# Patient Record
Sex: MALE | Race: WHITE | NOT HISPANIC OR LATINO | Employment: OTHER | URBAN - METROPOLITAN AREA
[De-identification: names, ages, dates, MRNs, and addresses within clinical notes are randomized per-mention and may not be internally consistent; named-entity substitution may affect disease eponyms.]

---

## 2017-01-09 DIAGNOSIS — E87.6 HYPOKALEMIA: ICD-10-CM

## 2017-01-09 LAB
ANION GAP SERPL CALCULATED.3IONS-SCNC: 9 MMOL/L (ref 3–14)
BUN SERPL-MCNC: 20 MG/DL (ref 7–30)
CALCIUM SERPL-MCNC: 9 MG/DL (ref 8.5–10.1)
CHLORIDE SERPL-SCNC: 99 MMOL/L (ref 94–109)
CO2 SERPL-SCNC: 31 MMOL/L (ref 20–32)
CREAT SERPL-MCNC: 0.95 MG/DL (ref 0.66–1.25)
GFR SERPL CREATININE-BSD FRML MDRD: 80 ML/MIN/1.7M2
GLUCOSE SERPL-MCNC: 108 MG/DL (ref 70–99)
POTASSIUM SERPL-SCNC: 3.5 MMOL/L (ref 3.4–5.3)
SODIUM SERPL-SCNC: 139 MMOL/L (ref 133–144)

## 2017-01-09 PROCEDURE — 99000 SPECIMEN HANDLING OFFICE-LAB: CPT | Performed by: FAMILY MEDICINE

## 2017-01-09 PROCEDURE — 80048 BASIC METABOLIC PNL TOTAL CA: CPT | Mod: 90 | Performed by: FAMILY MEDICINE

## 2017-01-09 PROCEDURE — 36415 COLL VENOUS BLD VENIPUNCTURE: CPT | Performed by: FAMILY MEDICINE

## 2017-01-11 DIAGNOSIS — E87.6 HYPOKALEMIA: Primary | ICD-10-CM

## 2017-02-23 DIAGNOSIS — E87.6 HYPOKALEMIA: ICD-10-CM

## 2017-02-23 LAB — POTASSIUM SERPL-SCNC: 3.1 MMOL/L (ref 3.4–5.3)

## 2017-02-23 PROCEDURE — 84132 ASSAY OF SERUM POTASSIUM: CPT | Performed by: FAMILY MEDICINE

## 2017-02-23 PROCEDURE — 36415 COLL VENOUS BLD VENIPUNCTURE: CPT | Performed by: FAMILY MEDICINE

## 2017-02-27 ENCOUNTER — TELEPHONE (OUTPATIENT)
Dept: FAMILY MEDICINE | Facility: CLINIC | Age: 62
End: 2017-02-27

## 2017-02-27 DIAGNOSIS — I10 BENIGN ESSENTIAL HYPERTENSION: Primary | ICD-10-CM

## 2017-02-27 RX ORDER — LISINOPRIL 20 MG/1
20 TABLET ORAL DAILY
Qty: 30 TABLET | Refills: 0 | Status: SHIPPED | OUTPATIENT
Start: 2017-02-27 | End: 2017-03-16

## 2017-02-27 NOTE — TELEPHONE ENCOUNTER
Reason for Call:  Other prescription    Detailed comments: pt is calling because his potasium is low and he wants to know what to do for this   And he wants to know if another medication could be the cause of this      Phone Number Patient can be reached at: Home number on file [phone] (home)    Best Time: any     Can we leave a detailed message on this number? YES    Call taken on 2/27/2017 at 9:15 AM by Agustina Starr

## 2017-02-27 NOTE — TELEPHONE ENCOUNTER
Advised pt of instructions below.  Pt wants to try a different blood pressure medication.  Routed to care team providers as Dr Hauser is out of office this week.  Samantha Viveros RN

## 2017-02-27 NOTE — TELEPHONE ENCOUNTER
2/23/17 potassium is 3.1.    Your potassium level is still low.  This can be due to using a water pill.       To address this, you can ether:   1) continue supplement with the water pill indefinitely.   2) change chlorthalidone to a different blood pressure medication     Please let us know how to proceed.     Please contact the clinic if you have any additional questions or concerns.  Have a great day!     Yours truly,   Dr. Hauser

## 2017-03-16 ENCOUNTER — OFFICE VISIT (OUTPATIENT)
Dept: FAMILY MEDICINE | Facility: CLINIC | Age: 62
End: 2017-03-16
Payer: COMMERCIAL

## 2017-03-16 VITALS
SYSTOLIC BLOOD PRESSURE: 120 MMHG | TEMPERATURE: 97.3 F | WEIGHT: 180 LBS | DIASTOLIC BLOOD PRESSURE: 86 MMHG | HEART RATE: 88 BPM | BODY MASS INDEX: 27.28 KG/M2 | HEIGHT: 68 IN

## 2017-03-16 DIAGNOSIS — E87.6 HYPOKALEMIA: ICD-10-CM

## 2017-03-16 DIAGNOSIS — I10 BENIGN ESSENTIAL HYPERTENSION: Primary | ICD-10-CM

## 2017-03-16 DIAGNOSIS — J44.9 CHRONIC OBSTRUCTIVE PULMONARY DISEASE, UNSPECIFIED COPD TYPE (H): ICD-10-CM

## 2017-03-16 LAB
ANION GAP SERPL CALCULATED.3IONS-SCNC: 7 MMOL/L (ref 3–14)
BUN SERPL-MCNC: 13 MG/DL (ref 7–30)
CALCIUM SERPL-MCNC: 8.9 MG/DL (ref 8.5–10.1)
CHLORIDE SERPL-SCNC: 104 MMOL/L (ref 94–109)
CO2 SERPL-SCNC: 29 MMOL/L (ref 20–32)
CREAT SERPL-MCNC: 0.8 MG/DL (ref 0.66–1.25)
GFR SERPL CREATININE-BSD FRML MDRD: ABNORMAL ML/MIN/1.7M2
GLUCOSE SERPL-MCNC: 109 MG/DL (ref 70–99)
POTASSIUM SERPL-SCNC: 3.8 MMOL/L (ref 3.4–5.3)
SODIUM SERPL-SCNC: 140 MMOL/L (ref 133–144)

## 2017-03-16 PROCEDURE — 99214 OFFICE O/P EST MOD 30 MIN: CPT | Performed by: FAMILY MEDICINE

## 2017-03-16 PROCEDURE — 36415 COLL VENOUS BLD VENIPUNCTURE: CPT | Performed by: FAMILY MEDICINE

## 2017-03-16 PROCEDURE — 80048 BASIC METABOLIC PNL TOTAL CA: CPT | Performed by: FAMILY MEDICINE

## 2017-03-16 RX ORDER — LISINOPRIL 20 MG/1
20 TABLET ORAL DAILY
Qty: 90 TABLET | Refills: 3 | Status: SHIPPED | OUTPATIENT
Start: 2017-03-16 | End: 2018-03-26

## 2017-03-16 NOTE — LETTER
My COPD Action Plan   Name: Dustin Shah    YOB: 1955   Date: 3/16/2017    My doctor: Kirill Hauser MD   My clinic: [address] [address] [phone]  My Controller Medicine: Serevent/Fluticasone (Advair)   Dose: 250/50 mcg 1 puff twice a day      My Rescue Medicine: Albuterol (Proair/Ventolin/Proventil) inhaler   Dose: Take 2 puffs every 4 hrs as needed for wheezing; 2 puffs before exercise     My Flare Up Medicine: none   Dose: n/a  My COPD Severity: Mild = FeV1 > 80%     Use of Oxygen: Oxygen Not Prescribed         GREEN ZONE       Doing well today      Usual level of activity and exercise    Usual amount of cough and mucus    No shortness of breath    Usual level of health (thinking clearly, sleeping well, feel like eating) Actions:      Take daily medicines    Use oxygen as prescribed    Follow regular exercise and diet plan    Avoid cigarette smoke and other irritants that harm the lungs           YELLOW ZONE          Having a bad day or flare up      Short of breath more than usual    A lot more sputum (mucus) than usual    Sputum looks yellow, green, tan, brown or bloody    More coughing or wheezing    Fever or chills    Less energy; trouble completing activities    Trouble thinking or focusing    Using quick relief inhaler or nebulizer more often    Poor sleep; symptoms wake me up    Do not feel like eating Actions:      Get plenty of rest    Take daily medicines    Use quick relief inhaler every 4 hours    If you use oxygen, call you doctor to see if you should adjust your oxygen    Do breathing exercises or other things to help you relax    Let a loved one, friend or neighbor know you are feeling worse    Call your care team if you have 2 or more symptoms.  Start taking steroids or antibiotics if directed by your care team           RED ZONE       Need medical care now      Severe shortness of breath (feel you can't  breathe)    Fever, chills    Not enough breath to do any activity    Trouble coughing up mucus, walking or talking    Blood in mucus    Frequent coughing   Rescue medicines are not working    Not able to sleep because of breathing    Feel confused or drowsy    Chest pain    Actions:      Call your health care team.  If you cannot reach your care team, call 911 or go to the emergency room.        Electronically signed by: Kirill Hauser, March 16, 2017  Annual Reminders:  Meet with Care Team, Flu Shot every Fall and Pneumonia Shot at least once  [facility] [place] [facility] [id number] [facility]

## 2017-03-16 NOTE — PROGRESS NOTES
"  SUBJECTIVE:                                                    Dustin Shah is a 61 year old male who presents to clinic today for the following health issues:  Chief Complaint   Patient presents with     Hypertension     Pt here for b/p f/u after starting lisinopril 2 wks ago.       Hypertension Follow-up      Outpatient blood pressures are being checked at home.  Results are normal.    Low Salt Diet: no added salt    Denies chest pain, dyspnea, HA, BOV, dizziness or urinary changes.    Patient tolerating lisinopril.  This replaced the diuretic due to hypokalemia.    Last K level was 3.1 3 weeks ago. Patient denies weakness, chest pain or GI symptoms.       Amount of exercise or physical activity: none due to recent foot surgery    Problems taking medications regularly: No    Medication side effects: none  Diet: low salt    COPD Follow-Up    Symptoms are currently: stable    Current fatigue or dyspnea with ambulation: none    Shortness of breath: only on overexertion - resolves by restsing and does not require rescue inhaler    Increased or change in Cough/Sputum: No    Fever(s): No    Baseline ambulation without stopping to rest \"no limit\" per patient. Able to walk up more than 3 flights of stairs without stopping to rest.    Any ER/UC or hospital admissions since your last visit? No     History   Smoking Status     Former Smoker     Packs/day: 1.00     Years: 40.00     Types: Cigarettes     Quit date: 12/8/2013   Smokeless Tobacco     Never Used     FEV1 in 84% in 2015.       Problem list and histories reviewed & adjusted, as indicated.  Additional history: as documented    Patient Active Problem List   Diagnosis     HL (hearing loss)     COPD (chronic obstructive pulmonary disease) (H)     CARDIOVASCULAR SCREENING; LDL GOAL LESS THAN 130     Benign essential hypertension     Former smoker     Undiagnosed cardiac murmurs     Hyperlipidemia with target LDL less than 100     Status post coronary angiogram     " COPD, moderate (H)     Advanced directives, counseling/discussion     Tubular adenoma of colon     Bicuspid aortic valve     Overweight (BMI 25.0-29.9)     Past Surgical History   Procedure Laterality Date     Colonoscopy       Hernia repair       Ent surgery       Tonsels       Social History   Substance Use Topics     Smoking status: Former Smoker     Packs/day: 1.00     Years: 40.00     Types: Cigarettes     Quit date: 2013     Smokeless tobacco: Never Used     Alcohol use Yes      Comment: social     Family History   Problem Relation Age of Onset     DIABETES Mother      Peripheral Vascular Disease Mother      mother w/ PAD     Heart Failure Mother      Heart Failure Maternal Half-Brother       of heart attack age 57     Coronary Artery Disease No family hx of          Current Outpatient Prescriptions   Medication Sig Dispense Refill     lisinopril (PRINIVIL/ZESTRIL) 20 MG tablet Take 1 tablet (20 mg) by mouth daily 90 tablet 3     fluticasone-salmeterol (ADVAIR) 250-50 MCG/DOSE diskus inhaler Inhale 1 puff into the lungs 2 times daily 3 Inhaler 1     omega 3 1000 MG CAPS Take 1 g by mouth daily 90 capsule      calcium carbonate-vitamin D 500-400 MG-UNIT TABS tablt Take 1 tablet by mouth daily 180 tablet 3     albuterol (PROAIR HFA, PROVENTIL HFA, VENTOLIN HFA) 108 (90 BASE) MCG/ACT inhaler Inhale 2 puffs into the lungs every 4 hours as needed for shortness of breath / dyspnea or wheezing 1 Inhaler 6     aspirin 81 MG tablet Take by mouth daily 30 tablet      [DISCONTINUED] lisinopril (PRINIVIL/ZESTRIL) 20 MG tablet Take 1 tablet (20 mg) by mouth daily 30 tablet 0     triamcinolone (KENALOG) 0.1 % ointment Apply sparingly to affected area two times daily for up to 10 consecutive days (Patient not taking: Reported on 3/16/2017) 30 g 0     order for DME Equipment being ordered: BP cuff 1 each prn     Allergies   Allergen Reactions     Nka [No Known Allergies]        Reviewed and updated as needed this  "visit by clinical staff  Tobacco  Allergies  Meds  Problems  Med Hx  Surg Hx  Fam Hx  Soc Hx        Reviewed and updated as needed this visit by Provider  Allergies  Meds  Problems         ROS:  C: NEGATIVE for fever, chills, change in weight  I: NEGATIVE for worrisome rashes, moles or lesions  E: NEGATIVE for vision changes or irritation  R: NEGATIVE for significant cough or SOB  CV: NEGATIVE for chest pain, palpitations or peripheral edema  GI: NEGATIVE for nausea, abdominal pain, heartburn, or change in bowel habits  : NEGATIVE for frequency, dysuria, or hematuria  M: NEGATIVE for significant arthralgias or myalgia  N: NEGATIVE for weakness, dizziness or paresthesias  E: NEGATIVE for temperature intolerance, skin/hair changes  H: NEGATIVE for bleeding problems    OBJECTIVE:                                                    /86  Pulse 88  Temp 97.3  F (36.3  C) (Tympanic)  Ht 5' 7.75\" (1.721 m)  Wt 180 lb (81.6 kg)  BMI 27.57 kg/m2  Body mass index is 27.57 kg/(m^2).  GENERAL:  alert and no distress, ambulatory w/o assist  NECK: no tenderness, no adenopathy,  Thyroid not enlarged  RESP: lungs clear to auscultation - no rales, no rhonchi, no wheezes  CV: regular rates and rhythm, no murmur  MS: no edema  SKIN: no suspicious lesions, no rashes  ABD:  nontender    Diagnostic test results:  Diagnostic Test Results:  Results for orders placed or performed in visit on 03/16/17   Basic metabolic panel   Result Value Ref Range    Sodium 140 133 - 144 mmol/L    Potassium 3.8 3.4 - 5.3 mmol/L    Chloride 104 94 - 109 mmol/L    Carbon Dioxide 29 20 - 32 mmol/L    Anion Gap 7 3 - 14 mmol/L    Glucose 109 (H) 70 - 99 mg/dL    Urea Nitrogen 13 7 - 30 mg/dL    Creatinine 0.80 0.66 - 1.25 mg/dL    GFR Estimate >90  Non  GFR Calc   >60 mL/min/1.7m2    GFR Estimate If Black >90   GFR Calc   >60 mL/min/1.7m2    Calcium 8.9 8.5 - 10.1 mg/dL        ASSESSMENT/PLAN:                     "                                    ICD-10-CM    1. Benign essential hypertension I10 Basic metabolic panel     lisinopril (PRINIVIL/ZESTRIL) 20 MG tablet  Controlled.  Low salt, low fat diet. Exercise as tolerated 30 mins per day 3 days a week.  Take meds as prescribed; call if with side effects.      2. Hypokalemia E87.6 Basic metabolic panel  Expect improvement after diuretic was stopped 2 weeks ago.     3. Chronic obstructive pulmonary disease, unspecified COPD type (H) J44.9 Controlled.  Continue Advair daily.  Albuterol inhaler prn wheezing - patient states he rarely uses, if at all.  UTD with immunizations.       Follow up with RN 6 months for BP recheck.   Patient Instructions   Blood pressure controlled now.  Continue Lisinopril at current dose.  Low salt, low fat diet.   Exercise: moderate intensity sustained for at least 30 mins per episode, goal of 150 mins per week at least  Schedule nurse visit in 6 months for a blood pressure recheck.  If you have physical symptoms or concerns before then, see provider.    Go to [facility] now for your blood draw.  You will be contacted in 1-2 days for results of your lab tests.            Kirill Hauser MD  [facility]

## 2017-03-16 NOTE — NURSING NOTE
"Chief Complaint   Patient presents with     Hypertension     Pt here for b/p f/u after starting lisinopril 2 wks ago.       Initial BP (!) 126/91  Pulse 88  Temp 97.3  F (36.3  C) (Tympanic)  Ht 5' 7.75\" (1.721 m)  Wt 180 lb (81.6 kg)  BMI 27.57 kg/m2 Estimated body mass index is 27.57 kg/(m^2) as calculated from the following:    Height as of this encounter: 5' 7.75\" (1.721 m).    Weight as of this encounter: 180 lb (81.6 kg).  Medication Reconciliation: complete  Luisa Fitzgerald CMA    "

## 2017-03-16 NOTE — PATIENT INSTRUCTIONS
Blood pressure controlled now.  Continue Lisinopril at current dose.  Low salt, low fat diet.   Exercise: moderate intensity sustained for at least 30 mins per episode, goal of 150 mins per week at least  Schedule nurse visit in 6 months for a blood pressure recheck.  If you have physical symptoms or concerns before then, see provider.    Go to Clinic [facility] now for your blood draw.  You will be contacted in 1-2 days for results of your lab tests.

## 2017-03-16 NOTE — MR AVS SNAPSHOT
After Visit Summary   3/16/2017    Dustin Shah    MRN: [id number]           Patient Information     Date Of Birth          1955        Visit Information        Provider Department      3/16/2017 7:20 AM Kirill Hauser MD [facility]        Today's Diagnoses     Benign essential hypertension    -  1    Hypokalemia        Chronic obstructive pulmonary disease, unspecified COPD type (H)          Care Instructions    Blood pressure controlled now.  Continue Lisinopril at current dose.  Low salt, low fat diet.   Exercise: moderate intensity sustained for at least 30 mins per episode, goal of 150 mins per week at least  Schedule nurse visit in 6 months for a blood pressure recheck.  If you have physical symptoms or concerns before then, see provider.    Go to Clinic [facility] now for your blood draw.  You will be contacted in 1-2 days for results of your lab tests.              Follow-ups after your visit        Follow-up notes from your care team     Return in about 6 months (around 9/16/2017), or if symptoms worsen or fail to improve.      Who to contact     If you have questions or need follow up information about today's clinic visit or your schedule please contact [facility] directly at [phone].  Normal or non-critical lab and imaging results will be communicated to you by MyChart, letter or phone within 4 business days after the clinic has received the results. If you do not hear from us within 7 days, please contact the clinic through [facility] or phone. If you have a critical or abnormal lab result, we will notify you by phone as soon as possible.  Submit refill requests through [facility] or call your pharmacy and they will forward the refill request to us. Please allow 3 business days for your refill to be completed.          Additional Information About Your Visit        MyChart Information     [facility] gives you secure access to your electronic health record.  "If you see a primary care provider, you can also send messages to your care team and make appointments. If you have questions, please call your primary care clinic.  If you do not have a primary care provider, please call [phone] and they will assist you.        Care EveryWhere ID     This is your Care EveryWhere ID. This could be used by other organizations to access your [place] medical records  [id number]        Your Vitals Were     Pulse Temperature Height BMI (Body Mass Index)          88 97.3  F (36.3  C) (Tympanic) 5' 7.75\" (1.721 m) 27.57 kg/m2         Blood Pressure from Last 3 Encounters:   03/16/17 120/86   12/16/16 120/80   09/01/16 126/88    Weight from Last 3 Encounters:   03/16/17 180 lb (81.6 kg)   12/16/16 179 lb (81.2 kg)   09/01/16 179 lb 6.4 oz (81.4 kg)              We Performed the Following     Basic metabolic panel          Where to get your medicines      These medications were sent to [address] [address]     Phone:  [phone]     lisinopril 20 MG tablet          Primary Care Provider Office Phone # Fax #    Kirill Hauser [phone]       [facility]        [address] [facility] [address]        Thank you!     Thank you for choosing [facility]  for your care. Our goal is always to provide you with excellent care. Hearing back from our patients is one way we can continue to improve our services. Please take a few minutes to complete the written survey that you may receive in the mail after your visit with us. Thank you!             Your Updated Medication List - Protect others around you: Learn how to safely use, store and throw away your medicines at www.disposemymeds.org.          This list is accurate as of: 3/16/17  7:59 AM.  Always use your most recent med list.                   Brand Name Dispense Instructions for use    " albuterol 108 (90 BASE) MCG/ACT Inhaler    PROAIR HFA/PROVENTIL HFA/VENTOLIN HFA    1 Inhaler    Inhale 2 puffs into the lungs every 4 hours as needed for shortness of breath / dyspnea or wheezing       aspirin 81 MG tablet     30 tablet    Take by mouth daily       calcium carbonate-vitamin D 500-400 MG-UNIT Tabs per tablet     180 tablet    Take 1 tablet by mouth daily       fluticasone-salmeterol 250-50 MCG/DOSE diskus inhaler    ADVAIR    3 Inhaler    Inhale 1 puff into the lungs 2 times daily       lisinopril 20 MG tablet    PRINIVIL/ZESTRIL    90 tablet    Take 1 tablet (20 mg) by mouth daily       omega 3 1000 MG Caps     90 capsule    Take 1 g by mouth daily       order for DME     1 each    Equipment being ordered: BP cuff       triamcinolone 0.1 % ointment    KENALOG    30 g    Apply sparingly to affected area two times daily for up to 10 consecutive days

## 2017-04-21 ENCOUNTER — HOSPITAL ENCOUNTER (OUTPATIENT)
Dept: PHYSICAL THERAPY | Facility: CLINIC | Age: 62
Setting detail: THERAPIES SERIES
End: 2017-04-21
Attending: FAMILY MEDICINE
Payer: OTHER MISCELLANEOUS

## 2017-04-21 PROCEDURE — 97110 THERAPEUTIC EXERCISES: CPT | Mod: GP | Performed by: PHYSICAL THERAPIST

## 2017-04-21 PROCEDURE — 97161 PT EVAL LOW COMPLEX 20 MIN: CPT | Mod: GP | Performed by: PHYSICAL THERAPIST

## 2017-04-21 PROCEDURE — 97140 MANUAL THERAPY 1/> REGIONS: CPT | Mod: GP | Performed by: PHYSICAL THERAPIST

## 2017-04-21 PROCEDURE — 40000718 ZZHC STATISTIC PT DEPARTMENT ORTHO VISIT: Performed by: PHYSICAL THERAPIST

## 2017-04-21 NOTE — PROGRESS NOTES
04/21/17 1100   General Information   Type of Visit Initial OP Ortho PT Evaluation   Start of Care Date 04/21/17   Referring Physician Yaakov Tate    Patient/Family Goals Statement Get back to work, Ladders, lifting, Squatting for work duties.    Orders Evaluate and Treat   Orders Comment Gait training, TBand, Wean off crutches and out of boot.    Date of Order 04/13/17   Insurance Type Other   Insurance Comments/Visits Authorized Work Comp - patient states that therapy has been approved.    Medical Diagnosis L Distal Fib Fracture, ORIF   Surgical/Medical history reviewed (R Arm Fx 7 years, High BP, Quit smoke 2013, Hernia 20 years.)   Precautions/Limitations no known precautions/limitations  (No full WBing yet. No stairs, except w/ crutches at home. )   Weight-Bearing Status - LLE weight-bearing as tolerated   Special Instructions Might go back to work light duty May 1st. Returns to MD May 25th.    General Information Comments No meds.    Body Part(s)   Body Part(s) Ankle/Foot   Presentation and Etiology   Pertinent history of current problem (include personal factors and/or comorbidities that impact the POC) Feb 7/17 was walking on a 45 degree slant downhill, it was icey, fell and twisted L ankle. Made it into work, and when took boot off, foot was really loose. Went to ER right away at [facility], X'Ray showed L Fibular Fx, had surgery on 2/24/17 with plate and screws into the Fibula.    Impairments A. Pain;B. Decreased WB tolerance;C. Swelling;E. Decreased flexibility;H. Impaired gait   Functional Limitations perform required work activities;perform desired leisure / sports activities   Symptom Location P once/while late at night across front of ankle, sharp.    How/Where did it occur With a fall   Onset date of current episode/exacerbation 02/17/17   Chronicity New   Pain quality A. Sharp   Frequency of pain/symptoms B. Intermittent   Pain/symptoms are: Worse during the night   Pain/symptoms exacerbated by  (All Day activity )   Pain/symptoms eased by C. Rest   Progression of symptoms since onset: Improved   Prior Level of Function   Functional Level Prior Comment Independent w/ ADL's.    Current Level of Function   Current Community Support Family/friend caregiver   Patient role/employment history A. Employed   Employment Comments  Technic, .    Living environment [facility]   Home/community accessibility Split level, has railings.    Current equipment-Gait/Locomotion Axillary crutches   Current equipment-ADL Shower/tub chair;Wall grab bar   Fall Risk Screen   Fall screen completed by PT   Per patient - Fall 2 or more times in past year? No   Per patient - Fall with injury in past year? Yes   Timed Up and Go score (seconds) Crutches.    Is patient a fall risk? Yes   System Outcome Measures   Outcome Measures (LEFS 43/80 )   Functional Scales   Functional Scales Patient Specific Functional Scale   Patient Specific Functional Scale Q1:;Q2:;Q3:   Q1: Able to squat w/minimal difficulty    Q2: MD Limitations for ladders for work duties   Q3: MD limitations for stairs at work.    Ankle/Foot Objective Findings   Observation No acute distress.    Integumentary Slight pitting edema lateral dorsal foot.    Gait/Locomotion Crutches WBAT.    Ankle/Foot ROM Comment PF L 25, R 41; DF L 14, R 19. Inv L 25% of R, Eversion L 50% of R.    Ankle/Foot Strength Comments Hip, Knee, Ankle DF 5/5,    Palpation No tenderness to Palpation.    Accessory Motion/Joint Mobility Tight Tib/Fib Distal and proximal, Forefoot, Talo-crural jt.    Planned Therapy Interventions   Planned Therapy Interventions Comment MT,. STM, S&S, Jt mobs, Gait, Proprioception, Balance Training. Develop HEP    Planned Modality Interventions   Planned Modality Interventions Comments Modalities as indicated.    Clinical Impression   Criteria for Skilled Therapeutic Interventions Met yes, treatment indicated   PT Diagnosis Decreased mobility  d/t Ankle Fx and ORIF.    Influenced by the following impairments Impaired Gait, WBing, Stiffness    Functional limitations due to impairments Workability, Ambulation, mobility in community.    Clinical Presentation Evolving/Changing   Clinical Presentation Rationale LEFS, ROM, Decreased jt mobility in L foot/ankle complex.    Clinical Decision Making (Complexity) Low complexity   Therapy Frequency 2 times/Week   Predicted Duration of Therapy Intervention (days/wks) 1 month, then reassess.    Risk & Benefits of therapy have been explained Yes   Patient, Family & other staff in agreement with plan of care Yes   Clinical Impression Comments Decreased mobility d/t Ankle Fx and ORIF.    Education Assessment   Preferred Learning Style Listening;Demonstration;Pictures/video   Barriers to Learning No barriers   ORTHO GOALS   PT Ortho Eval Goals 1;2;3;4   Ortho Goal 1   Goal Identifier 1   Goal Description STG: Pt able to go up/down stairs w/o difficulty for work duties    Target Date 05/19/17   Ortho Goal 2   Goal Identifier 2   Goal Description STG: Pt will be able to squat w/o difficulty or P for work duties.    Target Date 05/19/17   Ortho Goal 3   Goal Identifier 3   Goal Description STG: Pt willl have limitation removed for ladders, so that he can do his complete work duties.    Target Date 06/16/17   Ortho Goal 4   Goal Identifier 4   Goal Description LTG: PT will be independent w/HEP and self cares to be able to return to full work duties.    Target Date 07/21/17   Total Evaluation Time   Total Evaluation Time 55 Total (Eval x 30, Ex x 10, MT x 15)    Marry Parry, PT, [facility] (#[id number])  [facility]           [phone]  Fax          [phone]  Appt #      [phone]

## 2017-04-21 NOTE — PROGRESS NOTES
"  Date/Exercise  4/21/17         TBand x 4 DF/PF/Inv/Amie  Green x 10-30         PROM by Patient   All Planes        Towel STretch   Gas/Sol 15\" x 2  2-3x/day         Squats, Side Lunges, Heel-Toe         Gas/Sol Wall Stretch          Forward Lunge           Side, Retro, Braid, Tandem          BOSU, BAPS, Airex          EO/EC Ankle, SLS, Tandem                                                       "

## 2017-04-24 ENCOUNTER — HOSPITAL ENCOUNTER (OUTPATIENT)
Dept: PHYSICAL THERAPY | Facility: CLINIC | Age: 62
Setting detail: THERAPIES SERIES
End: 2017-04-24
Attending: ORTHOPAEDIC SURGERY
Payer: OTHER MISCELLANEOUS

## 2017-04-24 PROCEDURE — 97140 MANUAL THERAPY 1/> REGIONS: CPT | Mod: GP | Performed by: PHYSICAL THERAPIST

## 2017-04-24 PROCEDURE — 40000718 ZZHC STATISTIC PT DEPARTMENT ORTHO VISIT: Performed by: PHYSICAL THERAPIST

## 2017-04-24 PROCEDURE — 97110 THERAPEUTIC EXERCISES: CPT | Mod: GP | Performed by: PHYSICAL THERAPIST

## 2017-04-27 ENCOUNTER — HOSPITAL ENCOUNTER (OUTPATIENT)
Dept: PHYSICAL THERAPY | Facility: CLINIC | Age: 62
Setting detail: THERAPIES SERIES
End: 2017-04-27
Attending: ORTHOPAEDIC SURGERY
Payer: OTHER MISCELLANEOUS

## 2017-04-27 PROCEDURE — 97110 THERAPEUTIC EXERCISES: CPT | Mod: GP | Performed by: PHYSICAL THERAPIST

## 2017-04-27 PROCEDURE — 97140 MANUAL THERAPY 1/> REGIONS: CPT | Mod: GP | Performed by: PHYSICAL THERAPIST

## 2017-04-27 PROCEDURE — 40000718 ZZHC STATISTIC PT DEPARTMENT ORTHO VISIT: Performed by: PHYSICAL THERAPIST

## 2017-05-03 ENCOUNTER — HOSPITAL ENCOUNTER (OUTPATIENT)
Dept: PHYSICAL THERAPY | Facility: CLINIC | Age: 62
Setting detail: THERAPIES SERIES
End: 2017-05-03
Attending: ORTHOPAEDIC SURGERY
Payer: OTHER MISCELLANEOUS

## 2017-05-03 PROCEDURE — 97110 THERAPEUTIC EXERCISES: CPT | Mod: GP | Performed by: PHYSICAL THERAPIST

## 2017-05-03 PROCEDURE — 40000718 ZZHC STATISTIC PT DEPARTMENT ORTHO VISIT: Performed by: PHYSICAL THERAPIST

## 2017-05-05 ENCOUNTER — HOSPITAL ENCOUNTER (OUTPATIENT)
Dept: PHYSICAL THERAPY | Facility: CLINIC | Age: 62
Setting detail: THERAPIES SERIES
End: 2017-05-05
Attending: ORTHOPAEDIC SURGERY
Payer: OTHER MISCELLANEOUS

## 2017-05-05 PROCEDURE — 97140 MANUAL THERAPY 1/> REGIONS: CPT | Mod: GP | Performed by: PHYSICAL THERAPIST

## 2017-05-05 PROCEDURE — 40000718 ZZHC STATISTIC PT DEPARTMENT ORTHO VISIT: Performed by: PHYSICAL THERAPIST

## 2017-05-05 PROCEDURE — 97110 THERAPEUTIC EXERCISES: CPT | Mod: GP | Performed by: PHYSICAL THERAPIST

## 2017-05-11 ENCOUNTER — HOSPITAL ENCOUNTER (OUTPATIENT)
Dept: PHYSICAL THERAPY | Facility: CLINIC | Age: 62
Setting detail: THERAPIES SERIES
End: 2017-05-11
Attending: ORTHOPAEDIC SURGERY
Payer: OTHER MISCELLANEOUS

## 2017-05-11 PROCEDURE — 40000718 ZZHC STATISTIC PT DEPARTMENT ORTHO VISIT: Performed by: PHYSICAL THERAPIST

## 2017-05-11 PROCEDURE — 97110 THERAPEUTIC EXERCISES: CPT | Mod: GP | Performed by: PHYSICAL THERAPIST

## 2017-05-18 ENCOUNTER — HOSPITAL ENCOUNTER (OUTPATIENT)
Dept: PHYSICAL THERAPY | Facility: CLINIC | Age: 62
Setting detail: THERAPIES SERIES
End: 2017-05-18
Attending: ORTHOPAEDIC SURGERY
Payer: OTHER MISCELLANEOUS

## 2017-05-18 PROCEDURE — 97035 APP MDLTY 1+ULTRASOUND EA 15: CPT | Mod: GP | Performed by: PHYSICAL THERAPIST

## 2017-05-18 PROCEDURE — 40000718 ZZHC STATISTIC PT DEPARTMENT ORTHO VISIT: Performed by: PHYSICAL THERAPIST

## 2017-05-18 PROCEDURE — 97110 THERAPEUTIC EXERCISES: CPT | Mod: GP | Performed by: PHYSICAL THERAPIST

## 2017-05-19 DIAGNOSIS — J44.9 COPD, MODERATE (H): ICD-10-CM

## 2017-05-19 NOTE — TELEPHONE ENCOUNTER
Advair       Last Written Prescription Date: 11/02/16  Last Fill Quantity: 3, # refills: 1    Last Office Visit with [facility] or [facility] prescribing provider:  03/16/17   Future Office Visit:       Date of Last Asthma Action Plan Letter:   There are no preventive care reminders to display for this patient.   Asthma Control Test: No flowsheet data found.    Date of Last Spirometry Test:   No results found for this or any previous visit.

## 2017-05-22 ENCOUNTER — HOSPITAL ENCOUNTER (OUTPATIENT)
Dept: PHYSICAL THERAPY | Facility: CLINIC | Age: 62
Setting detail: THERAPIES SERIES
End: 2017-05-22
Attending: ORTHOPAEDIC SURGERY
Payer: OTHER MISCELLANEOUS

## 2017-05-22 PROCEDURE — 97110 THERAPEUTIC EXERCISES: CPT | Mod: GP | Performed by: PHYSICAL THERAPIST

## 2017-05-22 PROCEDURE — 40000718 ZZHC STATISTIC PT DEPARTMENT ORTHO VISIT: Performed by: PHYSICAL THERAPIST

## 2017-05-22 NOTE — PROGRESS NOTES
PROGRESS NOTE FOR MD 05/22/17 0700   Signing Clinician's Name / Credentials   Signing clinician's name / credentials Marry Parry, PT, MTC    Session Number   Session Number 8/13 W/C.    Progress Note/Recertification   Progress Note Due Date 06/22/17   Progress Note Completed Date 05/22/17   Adult Goals   PT Ortho Eval Goals 1;2;3;4   Ortho Goal 1   Goal Identifier 1   Goal Description STG: Pt able to go up/down stairs w/o difficulty for work duties  5/22/17 Minimal Difficulty    Target Date 05/19/17   Ortho Goal 2   Goal Identifier 2   Goal Description STG: Pt will be able to squat w/o difficulty or P for work duties.  5/22/17 No difficulty.    Target Date 05/19/17   Date Met 05/22/17   Ortho Goal 3   Goal Identifier 3   Goal Description STG: Pt willl have limitation removed for ladders, so that he can do his complete work duties.  5/22/17 NOT MET    Target Date 06/16/17   Ortho Goal 4   Goal Identifier 4   Goal Description LTG: PT will be independent w/HEP and self cares to be able to return to full work duties.    Target Date 07/21/17   Subjective Report   Subjective Report Still on restrictions for ladders. Sees MD this week. Foot not as sore, but has not been on it since last seen. Was out of town and off work last night.  P Scale: 0/10, sharp P's once/while.    Objective Measures   Objective Measures Objective Measure 1;Objective Measure 2   Objective Measure 1   Objective Measure LEFS   Details 5/22/17 Score 62/80  INITIALLY: 43/80    Objective Measure 2   Objective Measure AROM    Details 5/18/17 DF L 16, PF L 42, Eversion 75%, Eversion 50% of R. INITIALLY: DF L 14, R 19; PF L 25, R 41. Eversion L 50%, Inv L 25%.    Modalities   Modalities Ultrasound   Ultrasound   Minutes 10   Skilled Intervention Pain, improve healing.    Patient Response Feels better already.    Treatment Detail 50% 1.2 W/cm2 to L Ankle complex.    Treatment Interventions   Interventions Therapeutic Procedure/Exercise;Manual Therapy  "  Therapeutic Procedure/exercise   Minutes 15   Skilled Intervention Balance, Gait, etc.     Patient Response Told to start to wean out of boot at work.    Treatment Detail Tandem and Ankle Sways on Airex, Braiding, front crossovers, behind crossovers, Tandem forward and back. 4\" Step up/over added. UBE Seat 6 x 5', Resist 4.0.    Progress Per ex flow sheet.    Manual Therapy   Minutes 0   Treatment Detail STM to Achilles tendon and ligaments around malleoli.  Jt mobs for Talocrural jt Post/Ant glides, Med/Lat glides. Tib Fib mobs distal and provimal, Forefoot mobs.    Assessments Completed   Assessments Completed Ankle    Equipment Needs   Equipment Needs Green TBand   Plan   Homework Auth'd 13 visits to 12/31/17.    Updates to plan of care LEFS next visit. Seeedilberto MCCRAY 5/25/17    Plan for next session Balance, Coordination, Proprioception   Comments   Comments Limited mobility d/t L Ankle Fx w/ ORIG.    Marry Parry, PT, MTC (#[id number])  [facility]           [phone]  Fax          [phone]  Appt #      [phone]    "

## 2017-06-06 NOTE — PROGRESS NOTES
"Outpatient Physical Therapy Discharge Note     Patient: Dustin Shah  : 1955    Beginning/End Dates of Reporting Period:  17  to 2017    Referring Provider: Yaakov Tate Diagnosis: L Distal Fib Fracture, ORIF     Client Self Report: On 17 patient cancelled, saying his is back to work and no longer on work comp. On 17, \"Still on restrictions for ladders. Sees MD this week. Foot not as sore, but has not been on it since last seen. Was out of town and off work last night.  P Scale: 0/10, sharp P's once/while. \"    Objective Measurements:  Objective Measure: LEFS  Details: 17 Score 62/80    INITIALLY: 43/80     Objective Measure: AROM   Details: 17 DF L 16, PF L 42, Eversion 75%, Eversion 50% of R.   INITIALLY: DF L 14, R 19; PF L 25, R 41. Eversion L 50%, Inv L 25%.      Goals:  Goal Identifier 1   Goal Description STG: Pt able to go up/down stairs w/o difficulty for work duties  17 Minimal Difficulty    Target Date 17   Date Met      Progress:     Goal Identifier 2   Goal Description STG: Pt will be able to squat w/o difficulty or P for work duties.  17 No difficulty.    Target Date 17   Date Met  17   Progress:     Goal Identifier 3   Goal Description STG: Pt willl have limitation removed for ladders, so that he can do his complete work duties.  17 Pt back to work.  MET    Target Date 17   Date Met  17   Progress:     Goal Identifier 4   Goal Description LTG: PT will be independent w/HEP and self cares to be able to return to full work duties. 17 MET    Target Date 17   Date Met  17   Progress:     Progress Toward Goals:   Progress this reporting period: Pt met 2/3 STG's. Has returned to work.     Plan:  Discharge from therapy.    Discharge:    Reason for Discharge: No further expectation of progress.  Patient chooses to discontinue therapy.  Patient has failed to schedule further appointments.  Pt is back " to work.     Equipment Issued:      Discharge Plan: Patient to continue home program.  Pt to follow up w/MD as appropriate.   Marry Parry, PT, [facility] (#[id number])  [facility]           [phone]  Fax          [phone]  Appt #      [phone]

## 2017-11-13 ENCOUNTER — OFFICE VISIT (OUTPATIENT)
Dept: FAMILY MEDICINE | Facility: CLINIC | Age: 62
End: 2017-11-13
Payer: COMMERCIAL

## 2017-11-13 VITALS
WEIGHT: 171 LBS | OXYGEN SATURATION: 95 % | HEIGHT: 68 IN | HEART RATE: 88 BPM | DIASTOLIC BLOOD PRESSURE: 86 MMHG | TEMPERATURE: 98.1 F | BODY MASS INDEX: 25.91 KG/M2 | SYSTOLIC BLOOD PRESSURE: 132 MMHG

## 2017-11-13 DIAGNOSIS — I10 BENIGN ESSENTIAL HYPERTENSION: ICD-10-CM

## 2017-11-13 DIAGNOSIS — R73.09 ELEVATED GLUCOSE LEVEL: ICD-10-CM

## 2017-11-13 DIAGNOSIS — J44.9 STAGE 1 MILD COPD BY GOLD CLASSIFICATION (H): Primary | ICD-10-CM

## 2017-11-13 DIAGNOSIS — Z23 NEED FOR PROPHYLACTIC VACCINATION AND INOCULATION AGAINST INFLUENZA: ICD-10-CM

## 2017-11-13 PROCEDURE — 90686 IIV4 VACC NO PRSV 0.5 ML IM: CPT | Performed by: FAMILY MEDICINE

## 2017-11-13 PROCEDURE — 90471 IMMUNIZATION ADMIN: CPT | Performed by: FAMILY MEDICINE

## 2017-11-13 PROCEDURE — 99214 OFFICE O/P EST MOD 30 MIN: CPT | Mod: 25 | Performed by: FAMILY MEDICINE

## 2017-11-13 NOTE — NURSING NOTE
"Chief Complaint   Patient presents with     COPD     follow up     Flu Shot     Medication Question     patient recieved a letter from [facility] [employer] stating if he went to [employer] Advair he could save money .        Initial /86 (BP Location: Right arm, Patient Position: Chair, Cuff Size: Adult Large)  Pulse 88  Temp 98.1  F (36.7  C) (Tympanic)  Ht 5' 7.75\" (1.721 m)  Wt 171 lb (77.6 kg)  SpO2 95%  BMI 26.19 kg/m2 Estimated body mass index is 26.19 kg/(m^2) as calculated from the following:    Height as of this encounter: 5' 7.75\" (1.721 m).    Weight as of this encounter: 171 lb (77.6 kg).  Medication Reconciliation: complete    "

## 2017-11-13 NOTE — PROGRESS NOTES
"  SUBJECTIVE:   Dustin Shah is a 62 year old male who presents to clinic today for the following health issues:    Chief Complaint   Patient presents with     COPD     follow up     Flu Shot     Medication Question     patient recieved a letter from [employer] stating if he went to Generic Advair he could save money .        COPD Follow-Up    Symptoms are currently: stable    Current fatigue or dyspnea with ambulation: stable     Shortness of breath: stable    Increased or change in Cough/Sputum: Yes-  More \" phlegmy\"     Fever(s): No    Baseline ambulation without stopping to rest:  1 miles. Able to walk up 3 flights of stairs without stopping to rest.    Any ER/UC or hospital admissions since your last visit? No     History   Smoking Status     Former Smoker     Packs/day: 1.00     Years: 40.00     Types: Cigarettes     Quit date: 12/8/2013   Smokeless Tobacco     Never Used     No results found for: FEV1, [id number]      Amount of exercise or physical activity: None    Problems taking medications regularly: No    Medication side effects: none    Diet: regular (no restrictions)    On review of patient's other med conditions, patient has been noted to have elevated non-fasting glucose and fasting glucose before. Due for repeat for this and for BMP for HTN in a few months.      Problem list and histories reviewed & adjusted, as indicated.  Additional history: as documented    Patient Active Problem List   Diagnosis     HL (hearing loss)     COPD (chronic obstructive pulmonary disease) (H)     CARDIOVASCULAR SCREENING; LDL GOAL LESS THAN 130     Benign essential hypertension     Former smoker     Undiagnosed cardiac murmurs     Hyperlipidemia with target LDL less than 100     Status post coronary angiogram     Stage 1 mild COPD by GOLD classification (H)     Advanced directives, counseling/discussion     Tubular adenoma of colon     Bicuspid aortic valve     Overweight (BMI 25.0-29.9)     Past Surgical History: "   Procedure Laterality Date     COLONOSCOPY       ENT SURGERY      Tonsels     HERNIA REPAIR         Social History   Substance Use Topics     Smoking status: Former Smoker     Packs/day: 1.00     Years: 40.00     Types: Cigarettes     Quit date: 2013     Smokeless tobacco: Never Used     Alcohol use Yes      Comment: social     Family History   Problem Relation Age of Onset     DIABETES Mother      Peripheral Vascular Disease Mother      mother w/ PAD     Heart Failure Mother      Heart Failure Maternal Half-Brother       of heart attack age 57     Coronary Artery Disease No family hx of          Current Outpatient Prescriptions   Medication Sig Dispense Refill     fluticasone-salmeterol (ADVAIR DISKUS) 250-50 MCG/DOSE diskus inhaler Inhale 1 puff into the lungs 2 times daily May substitute Generic 1 Inhaler 11     lisinopril (PRINIVIL/ZESTRIL) 20 MG tablet Take 1 tablet (20 mg) by mouth daily 90 tablet 3     omega 3 1000 MG CAPS Take 1 g by mouth daily 90 capsule      calcium carbonate-vitamin D 500-400 MG-UNIT TABS tablt Take 1 tablet by mouth daily 180 tablet 3     order for DME Equipment being ordered: BP cuff 1 each prn     albuterol (PROAIR HFA, PROVENTIL HFA, VENTOLIN HFA) 108 (90 BASE) MCG/ACT inhaler Inhale 2 puffs into the lungs every 4 hours as needed for shortness of breath / dyspnea or wheezing 1 Inhaler 6     aspirin 81 MG tablet Take by mouth daily 30 tablet      [DISCONTINUED] ADVAIR DISKUS 250-50 MCG/DOSE diskus inhaler TAKE ONE PUFF BY MOUTH 2 TIMES DAILY **RINSE MOUTHOUT AFTER EACH USE** 3 Inhaler 1     Allergies   Allergen Reactions     Nka [No Known Allergies]      BP Readings from Last 3 Encounters:   17 132/86   17 120/86   16 120/80    Wt Readings from Last 3 Encounters:   17 171 lb (77.6 kg)   17 180 lb (81.6 kg)   16 179 lb (81.2 kg)                        Reviewed and updated as needed this visit by clinical staffTobacco  Allergies  Meds   "Problems  Med Hx  Surg Hx  Fam Hx  Soc Hx        Reviewed and updated as needed this visit by Provider  Allergies  Meds  Problems         ROS:  C: NEGATIVE for fever, chills, change in weight  I: NEGATIVE for worrisome rashes, moles or lesions  E: NEGATIVE for vision changes or irritation  E/M: NEGATIVE for ear, mouth and throat problems  RESP:as above  CV: NEGATIVE for chest pain, palpitations or peripheral edema  GI: NEGATIVE for nausea, abdominal pain, heartburn, or change in bowel habits  : NEGATIVE for frequency, dysuria, or hematuria    OBJECTIVE:                                                    /86 (BP Location: Right arm, Patient Position: Chair, Cuff Size: Adult Large)  Pulse 88  Temp 98.1  F (36.7  C) (Tympanic)  Ht 5' 7.75\" (1.721 m)  Wt 171 lb (77.6 kg)  SpO2 95%  BMI 26.19 kg/m2  Body mass index is 26.19 kg/(m^2).  GENERAL: well-nourished,  alert and no distress  EYES: pink conjunctivae, no icterus  NECK: moderate cervical adenopathy, nontender  HEENT: tympanic membrane intact and pearly bilaterally, nose with mild congestion, no sinus tenderness, throat moderately erythematous, no exudates, no oral ulcers  RESP: good air entry, equal breath sounds, no wheezing; no crackles  CV: normal rate, regular rhythm, normal S1 S2, no S3 or S4 and no murmur  SKIN:  Good turgor, no rashes    Diagnostic test results:  Diagnostic Test Results:  Results for orders placed or performed in visit on 03/16/17   Basic metabolic panel   Result Value Ref Range    Sodium 140 133 - 144 mmol/L    Potassium 3.8 3.4 - 5.3 mmol/L    Chloride 104 94 - 109 mmol/L    Carbon Dioxide 29 20 - 32 mmol/L    Anion Gap 7 3 - 14 mmol/L    Glucose 109 (H) 70 - 99 mg/dL    Urea Nitrogen 13 7 - 30 mg/dL    Creatinine 0.80 0.66 - 1.25 mg/dL    GFR Estimate >90  Non  GFR Calc   >60 mL/min/1.7m2    GFR Estimate If Black >90   GFR Calc   >60 mL/min/1.7m2    Calcium 8.9 8.5 - 10.1 mg/dL      "     ASSESSMENT/PLAN:                                                      ICD-10-CM    1. Stage 1 mild COPD by GOLD classification (H) J44.9 fluticasone-salmeterol (ADVAIR DISKUS) 250-50 MCG/DOSE diskus inhaler  Controlled.  Continue current meds.  Advised to bring albuterol with him all the time.     2. Elevated glucose level R73.09 **Basic metabolic panel FUTURE 2mo  Repeat 1 year after last.  Patient was advised regular exercise and reinforced diet modification to prevent diabetes.     3. Need for prophylactic vaccination and inoculation against influenza Z23 FLU VAC, SPLIT VIRUS IM > 3 YO (QUADRIVALENT) [44455]     Vaccine Administration, Initial [64128]     4. Benign essential hypertension I10 **Basic metabolic panel FUTURE 2mo       Follow up with Provider - 6 months or prn  Patient Instructions   Refilled your Advair with provision to substitute generic.  Be sure to carry albuterol inhaler at all times.    Schedule lab appointment (fasting 12 hrs) in February or March 2018.  Results to be given to you when available.    No change in medications.    Call cardiology on the number in the copy of the letter given to you today.        Thank you for choosing [facility].  You may be receiving a survey in the mail from [email or web address] regarding your visit today.  Please take a few minutes to complete and return the survey to let us know how we are doing.      If you have questions or concerns, please contact us via Livefyre or you can contact your care team at [phone].    Our Clinic hours are:  Monday 6:40 am  to 7:00 pm  Tuesday -Friday 6:40 am to 5:00 pm    The [place] outpatient lab hours are:  Monday - Friday 6:10 am to 4:45 pm  Saturdays 7:00 am to 11:00 am  Appointments are required, call [phone]    If you have clinical questions after hours or would like to schedule an appointment,  call the clinic at [phone].        Kirill Hauser MD  [facility]  Injectable Influenza  Immunization Documentation    1.  Is the person to be vaccinated sick today?   No    2. Does the person to be vaccinated have an allergy to a component   of the vaccine?   No  Egg Allergy Algorithm Link    3. Has the person to be vaccinated ever had a serious reaction   to influenza vaccine in the past?   No    4. Has the person to be vaccinated ever had Guillain-Barré syndrome?   No    Form completed by Mayo AYON CMA

## 2017-11-13 NOTE — LETTER
[facility]  [address] [address]  [phone]        March 19, 2018    Dustin Shah  [address]  [place] [address]              Dear Dustin Shah    This is to remind you that your Basic profile lab is due.    You may call our office at [phone] to schedule an appointment.    Please disregard this notice if you have already had your labs drawn or made an appointment.        Sincerely,        Kirill Hauser MD

## 2017-11-13 NOTE — PATIENT INSTRUCTIONS
Refilled your Advair with provision to substitute generic.  Be sure to carry albuterol inhaler at all times.    Schedule lab appointment (fasting 12 hrs) in February or March 2018.  Results to be given to you when available.    No change in medications.    Call cardiology on the number in the copy of the letter given to you today.        Thank you for choosing [facility]  You may be receiving a survey in the mail from [employer] regarding your visit today.  Please take a few minutes to complete and return the survey to let us know how we are doing.      If you have questions or concerns, please contact us via [employer] or you can contact your care team at [phone].    Our Clinic hours are:  Monday 6:40 am  to 7:00 pm  Tuesday -Friday 6:40 am to 5:00 pm    The [place] outpatient lab hours are:  Monday - Friday 6:10 am to 4:45 pm  Saturdays 7:00 am to 11:00 am  Appointments are required, call [phone]    If you have clinical questions after hours or would like to schedule an appointment,  call the clinic at [phone].

## 2017-11-13 NOTE — MR AVS SNAPSHOT
After Visit Summary   11/13/2017    Dustin Shah    MRN: [id number]           Patient Information     Date Of Birth          1955        Visit Information        Provider Department      11/13/2017 8:40 AM Kirill Hauser MD [facility]        Today's Diagnoses     Stage 1 mild COPD by GOLD classification (H)    -  1    Elevated glucose level        Need for prophylactic vaccination and inoculation against influenza        Benign essential hypertension          Care Instructions    Refilled your Advair with provision to substitute generic.  Be sure to carry albuterol inhaler at all times.    Schedule lab appointment (fasting 12 hrs) in February or March 2018.  Results to be given to you when available.    No change in medications.    Call cardiology on the number in the copy of the letter given to you today.        Thank you for choosing [facility].  You may be receiving a survey in the mail from [employer] regarding your visit today.  Please take a few minutes to complete and return the survey to let us know how we are doing.      If you have questions or concerns, please contact us via Tink or you can contact your care team at [phone].    Our Clinic hours are:  Monday 6:40 am  to 7:00 pm  Tuesday -Friday 6:40 am to 5:00 pm    The [place] outpatient lab hours are:  Monday - Friday 6:10 am to 4:45 pm  Saturdays 7:00 am to 11:00 am  Appointments are required, call [phone]    If you have clinical questions after hours or would like to schedule an appointment,  call the clinic at [phone].            Follow-ups after your visit        Follow-up notes from your care team     Return if symptoms worsen or fail to improve.      Future tests that were ordered for you today     Open Future Orders        Priority Expected Expires Ordered    **Basic metabolic panel FUTURE 2mo Routine 1/12/2018 3/13/2018 11/13/2017            Who to contact     If you have  "questions or need follow up information about today's clinic visit or your schedule please contact [facility] directly at [phone].  Normal or non-critical lab and imaging results will be communicated to you by MyChart, letter or phone within 4 business days after the clinic has received the results. If you do not hear from us within 7 days, please contact the clinic through [employer] or phone. If you have a critical or abnormal lab result, we will notify you by phone as soon as possible.  Submit refill requests through [employer] or call your pharmacy and they will forward the refill request to us. Please allow 3 business days for your refill to be completed.          Additional Information About Your Visit        [employer] Information     [employer] gives you secure access to your electronic health record. If you see a primary care provider, you can also send messages to your care team and make appointments. If you have questions, please call your primary care clinic.  If you do not have a primary care provider, please call [phone] and they will assist you.        Care EveryWhere ID     This is your Care EveryWhere ID. This could be used by other organizations to access your [facility] medical records  [id number]        Your Vitals Were     Pulse Temperature Height Pulse Oximetry BMI (Body Mass Index)       88 98.1  F (36.7  C) (Tympanic) 5' 7.75\" (1.721 m) 95% 26.19 kg/m2        Blood Pressure from Last 3 Encounters:   11/13/17 132/86   03/16/17 120/86   12/16/16 120/80    Weight from Last 3 Encounters:   11/13/17 171 lb (77.6 kg)   03/16/17 180 lb (81.6 kg)   12/16/16 179 lb (81.2 kg)              We Performed the Following     FLU VAC, SPLIT VIRUS IM > 3 YO (QUADRIVALENT) [47915]     Vaccine Administration, Initial [86547]          Today's Medication Changes          These changes are accurate as of: 11/13/17  9:18 AM.  If you have any questions, ask your nurse or doctor.               These " medicines have changed or have updated prescriptions.        Dose/Directions    fluticasone-salmeterol 250-50 MCG/DOSE diskus inhaler   Commonly known as:  ADVAIR DISKUS   This may have changed:  See the new instructions.   Used for:  Stage 1 mild COPD by GOLD classification (H)   Changed by:  Kirill Hauser MD        Dose:  1 puff   Inhale 1 puff into the lungs 2 times daily May substitute Generic   Quantity:  1 Inhaler   Refills:  11            Where to get your medicines      These medications were sent to [address] [address]     Phone:  [phone]     fluticasone-salmeterol 250-50 MCG/DOSE diskus inhaler                Primary Care Provider Office Phone # Fax #    Kirill Hauser [phone] [address] [address]        Equal Access to Services     LIANNE REYNOLDS : Hadii cori thakur hadasho Somadhavali, waaxda luqadaha, qaybta kaalmada adeegyada, tan conley . So [facility] [phone].    ATENCIÓN: Si habla español, tiene a garsia disposición servicios gratuitos de asistencia lingüística. Llame al [phone].    We comply with applicable federal civil rights laws and [place] laws. We do not discriminate on the basis of race, color, national origin, age, disability, sex, sexual orientation, or gender identity.            Thank you!     Thank you for choosing [facility]  for your care. Our goal is always to provide you with excellent care. Hearing back from our patients is one way we can continue to improve our services. Please take a few minutes to complete the written survey that you may receive in the mail after your visit with us. Thank you!             Your Updated Medication List - Protect others around you: Learn how to safely use, store and throw away your medicines at www.disposemymeds.org.          This list is accurate as  of: 11/13/17  9:18 AM.  Always use your most recent med list.                   Brand Name Dispense Instructions for use Diagnosis    albuterol 108 (90 BASE) MCG/ACT Inhaler    PROAIR HFA/PROVENTIL HFA/VENTOLIN HFA    1 Inhaler    Inhale 2 puffs into the lungs every 4 hours as needed for shortness of breath / dyspnea or wheezing    COPD, moderate (H)       aspirin 81 MG tablet     30 tablet    Take by mouth daily        calcium carbonate-vitamin D 500-400 MG-UNIT Tabs per tablet     180 tablet    Take 1 tablet by mouth daily        fluticasone-salmeterol 250-50 MCG/DOSE diskus inhaler    ADVAIR DISKUS    1 Inhaler    Inhale 1 puff into the lungs 2 times daily May substitute Generic    Stage 1 mild COPD by GOLD classification (H)       lisinopril 20 MG tablet    PRINIVIL/ZESTRIL    90 tablet    Take 1 tablet (20 mg) by mouth daily    Benign essential hypertension       omega 3 1000 MG Caps     90 capsule    Take 1 g by mouth daily        order for DME     1 each    Equipment being ordered: BP cuff    Benign essential hypertension

## 2018-03-26 DIAGNOSIS — I10 BENIGN ESSENTIAL HYPERTENSION: ICD-10-CM

## 2018-03-26 NOTE — TELEPHONE ENCOUNTER
"Requested Prescriptions   Pending Prescriptions Disp Refills     lisinopril (PRINIVIL/ZESTRIL) 20 MG tablet  Last Written Prescription Date:  03/16/17  Last Fill Quantity: 90,  # refills: 3   Last office visit: 11/13/2017 with prescribing provider:  11/13/17   Future Office Visit:     90 tablet 3     Sig: Take 1 tablet (20 mg) by mouth daily    ACE Inhibitors (Including Combos) Protocol Failed    3/26/2018 12:28 PM       Failed - Normal serum creatinine on file in past 12 months    Recent Labs   Lab Test  03/16/17   0801   CR  0.80          Failed - Normal serum potassium on file in past 12 months    Recent Labs   Lab Test  03/16/17   0801   POTASSIUM  3.8          Passed - Blood pressure under 140/90 in past 12 months    BP Readings from Last 3 Encounters:   11/13/17 132/86   03/16/17 120/86   12/16/16 120/80          Passed - Recent (12 mo) or future (30 days) visit within the authorizing provider's specialty    Patient had office visit in the last 12 months or has a visit in the next 30 days with authorizing provider or within the authorizing provider's specialty.  See \"Patient Info\" tab in inbasket, or \"Choose Columns\" in Meds & Orders section of the refill encounter.           Passed - Patient is age 18 or older          "

## 2018-03-28 RX ORDER — LISINOPRIL 20 MG/1
20 TABLET ORAL DAILY
Qty: 90 TABLET | Refills: 0 | Status: SHIPPED | OUTPATIENT
Start: 2018-03-28 | End: 2018-06-25

## 2018-04-02 DIAGNOSIS — I10 BENIGN ESSENTIAL HYPERTENSION: ICD-10-CM

## 2018-04-02 DIAGNOSIS — R73.09 ELEVATED GLUCOSE LEVEL: ICD-10-CM

## 2018-04-02 LAB
ANION GAP SERPL CALCULATED.3IONS-SCNC: 5 MMOL/L (ref 3–14)
BUN SERPL-MCNC: 19 MG/DL (ref 7–30)
CALCIUM SERPL-MCNC: 8.9 MG/DL (ref 8.5–10.1)
CHLORIDE SERPL-SCNC: 103 MMOL/L (ref 94–109)
CO2 SERPL-SCNC: 28 MMOL/L (ref 20–32)
CREAT SERPL-MCNC: 0.96 MG/DL (ref 0.66–1.25)
GFR SERPL CREATININE-BSD FRML MDRD: 79 ML/MIN/1.7M2
GLUCOSE SERPL-MCNC: 114 MG/DL (ref 70–99)
POTASSIUM SERPL-SCNC: 4.2 MMOL/L (ref 3.4–5.3)
SODIUM SERPL-SCNC: 136 MMOL/L (ref 133–144)

## 2018-04-02 PROCEDURE — 36415 COLL VENOUS BLD VENIPUNCTURE: CPT | Performed by: FAMILY MEDICINE

## 2018-04-02 PROCEDURE — 80048 BASIC METABOLIC PNL TOTAL CA: CPT | Performed by: FAMILY MEDICINE

## 2018-06-25 DIAGNOSIS — I10 BENIGN ESSENTIAL HYPERTENSION: ICD-10-CM

## 2018-06-25 NOTE — TELEPHONE ENCOUNTER
"Requested Prescriptions   Pending Prescriptions Disp Refills     lisinopril (PRINIVIL/ZESTRIL) 20 MG tablet  Last Written Prescription Date:  03/28/18  Last Fill Quantity: 90,  # refills: 0   Last office visit: 11/13/2017 with prescribing provider:  11/13/17   Future Office Visit:     90 tablet 0     Sig: Take 1 tablet (20 mg) by mouth daily    ACE Inhibitors (Including Combos) Protocol Passed    6/25/2018  9:08 AM       Passed - Blood pressure under 140/90 in past 12 months    BP Readings from Last 3 Encounters:   11/13/17 132/86   03/16/17 120/86   12/16/16 120/80          Passed - Recent (12 mo) or future (30 days) visit within the authorizing provider's specialty    Patient had office visit in the last 12 months or has a visit in the next 30 days with authorizing provider or within the authorizing provider's specialty.  See \"Patient Info\" tab in inbasket, or \"Choose Columns\" in Meds & Orders section of the refill encounter.           Passed - Patient is age 18 or older       Passed - Normal serum creatinine on file in past 12 months    Recent Labs   Lab Test  04/02/18   0635   CR  0.96          Passed - Normal serum potassium on file in past 12 months    Recent Labs   Lab Test  04/02/18   0635   POTASSIUM  4.2             "

## 2018-06-27 ENCOUNTER — TELEPHONE (OUTPATIENT)
Dept: FAMILY MEDICINE | Facility: CLINIC | Age: 63
End: 2018-06-27

## 2018-06-27 DIAGNOSIS — E78.5 HYPERLIPIDEMIA WITH TARGET LDL LESS THAN 100: Primary | ICD-10-CM

## 2018-06-28 DIAGNOSIS — E78.5 HYPERLIPIDEMIA WITH TARGET LDL LESS THAN 100: ICD-10-CM

## 2018-06-28 LAB
CHOLEST SERPL-MCNC: 210 MG/DL
HDLC SERPL-MCNC: 62 MG/DL
LDLC SERPL CALC-MCNC: 125 MG/DL
NONHDLC SERPL-MCNC: 148 MG/DL
TRIGL SERPL-MCNC: 115 MG/DL

## 2018-06-28 PROCEDURE — 80061 LIPID PANEL: CPT | Performed by: FAMILY MEDICINE

## 2018-06-28 PROCEDURE — 36415 COLL VENOUS BLD VENIPUNCTURE: CPT | Performed by: FAMILY MEDICINE

## 2018-06-28 RX ORDER — LISINOPRIL 20 MG/1
20 TABLET ORAL DAILY
Qty: 90 TABLET | Refills: 0 | Status: SHIPPED | OUTPATIENT
Start: 2018-06-28 | End: 2018-09-21

## 2018-06-28 NOTE — TELEPHONE ENCOUNTER
Prescription approved per [facility] Refill Protocol.    Yamini SPRAGUE RN

## 2018-09-21 DIAGNOSIS — I10 BENIGN ESSENTIAL HYPERTENSION: ICD-10-CM

## 2018-09-21 RX ORDER — LISINOPRIL 20 MG/1
20 TABLET ORAL DAILY
Qty: 90 TABLET | Refills: 0 | Status: SHIPPED | OUTPATIENT
Start: 2018-09-21 | End: 2018-12-22

## 2018-09-21 NOTE — TELEPHONE ENCOUNTER
"Requested Prescriptions   Pending Prescriptions Disp Refills     lisinopril (PRINIVIL/ZESTRIL) 20 MG tablet  Last Written Prescription Date:  06/28/18  Last Fill Quantity: 90,  # refills: 0   Last office visit: 11/13/2017 with prescribing provider:  11/13/17   Future Office Visit:     90 tablet 0     Sig: Take 1 tablet (20 mg) by mouth daily    ACE Inhibitors (Including Combos) Protocol Passed    9/21/2018 10:53 AM       Passed - Blood pressure under 140/90 in past 12 months    BP Readings from Last 3 Encounters:   11/13/17 132/86   03/16/17 120/86   12/16/16 120/80          Passed - Recent (12 mo) or future (30 days) visit within the authorizing provider's specialty    Patient had office visit in the last 12 months or has a visit in the next 30 days with authorizing provider or within the authorizing provider's specialty.  See \"Patient Info\" tab in inbasket, or \"Choose Columns\" in Meds & Orders section of the refill encounter.           Passed - Patient is age 18 or older       Passed - Normal serum creatinine on file in past 12 months    Recent Labs   Lab Test  04/02/18   0635   CR  0.96          Passed - Normal serum potassium on file in past 12 months    Recent Labs   Lab Test  04/02/18   0635   POTASSIUM  4.2             "

## 2018-12-11 DIAGNOSIS — J44.9 STAGE 1 MILD COPD BY GOLD CLASSIFICATION (H): ICD-10-CM

## 2018-12-11 NOTE — TELEPHONE ENCOUNTER
"Requested Prescriptions   Pending Prescriptions Disp Refills     fluticasone-salmeterol (ADVAIR DISKUS) 250-50 MCG/DOSE inhaler 1 Inhaler 11    Last Written Prescription Date:  11/13/17  Last Fill Quantity: 1,  # refills: 11   Last office visit: 11/13/2017 with prescribing provider:  Analisa   Future Office Visit:     Sig: Inhale 1 puff into the lungs 2 times daily May substitute Generic    Inhaled Steroids Protocol Failed - 12/11/2018 10:59 AM       Failed - Recent (12 mo) or future (30 days) visit within the authorizing provider's specialty    Patient had office visit in the last 12 months or has a visit in the next 30 days with authorizing provider or within the authorizing provider's specialty.  See \"Patient Info\" tab in inbasket, or \"Choose Columns\" in Meds & Orders section of the refill encounter.             Passed - Patient is age 12 or older          "

## 2018-12-22 ENCOUNTER — MYC REFILL (OUTPATIENT)
Dept: FAMILY MEDICINE | Facility: CLINIC | Age: 63
End: 2018-12-22

## 2018-12-22 DIAGNOSIS — I10 BENIGN ESSENTIAL HYPERTENSION: ICD-10-CM

## 2018-12-24 RX ORDER — LISINOPRIL 20 MG/1
20 TABLET ORAL DAILY
Qty: 30 TABLET | Refills: 0 | Status: SHIPPED | OUTPATIENT
Start: 2018-12-24 | End: 2018-12-27

## 2018-12-24 NOTE — TELEPHONE ENCOUNTER
Left message for patient to check the pharmacy, and he is due for OV for further refills.  Call the [facility] clinic back with questions or concerns.    Dang AYON Rn

## 2018-12-24 NOTE — TELEPHONE ENCOUNTER
Routing refill request to provider for review/approval because:  Shannon given x1 and patient did not follow up, please advise    Due for clinic visit.      Eufemia SY BSN, RN

## 2018-12-24 NOTE — TELEPHONE ENCOUNTER
One more month given while PCP is out.  Please call the patient to make an office visit for further refills.  Viktor Starr MD  Family Medicine

## 2018-12-27 ENCOUNTER — OFFICE VISIT (OUTPATIENT)
Dept: FAMILY MEDICINE | Facility: CLINIC | Age: 63
End: 2018-12-27
Payer: COMMERCIAL

## 2018-12-27 VITALS
SYSTOLIC BLOOD PRESSURE: 120 MMHG | HEART RATE: 75 BPM | HEIGHT: 68 IN | OXYGEN SATURATION: 92 % | WEIGHT: 178 LBS | TEMPERATURE: 99.1 F | BODY MASS INDEX: 26.98 KG/M2 | DIASTOLIC BLOOD PRESSURE: 84 MMHG

## 2018-12-27 DIAGNOSIS — J44.9 STAGE 1 MILD COPD BY GOLD CLASSIFICATION (H): Primary | ICD-10-CM

## 2018-12-27 DIAGNOSIS — E78.2 MIXED HYPERLIPIDEMIA: ICD-10-CM

## 2018-12-27 DIAGNOSIS — Z23 NEED FOR PROPHYLACTIC VACCINATION AND INOCULATION AGAINST INFLUENZA: ICD-10-CM

## 2018-12-27 DIAGNOSIS — I10 BENIGN ESSENTIAL HYPERTENSION: ICD-10-CM

## 2018-12-27 DIAGNOSIS — Z87.891 PERSONAL HISTORY OF TOBACCO USE: ICD-10-CM

## 2018-12-27 DIAGNOSIS — Z23 NEED FOR VACCINATION: ICD-10-CM

## 2018-12-27 PROCEDURE — G0296 VISIT TO DETERM LDCT ELIG: HCPCS | Performed by: FAMILY MEDICINE

## 2018-12-27 PROCEDURE — 90471 IMMUNIZATION ADMIN: CPT | Performed by: FAMILY MEDICINE

## 2018-12-27 PROCEDURE — 90472 IMMUNIZATION ADMIN EACH ADD: CPT | Performed by: FAMILY MEDICINE

## 2018-12-27 PROCEDURE — 99214 OFFICE O/P EST MOD 30 MIN: CPT | Mod: 25 | Performed by: FAMILY MEDICINE

## 2018-12-27 PROCEDURE — 90714 TD VACC NO PRESV 7 YRS+ IM: CPT | Performed by: FAMILY MEDICINE

## 2018-12-27 PROCEDURE — 90686 IIV4 VACC NO PRSV 0.5 ML IM: CPT | Performed by: FAMILY MEDICINE

## 2018-12-27 RX ORDER — LISINOPRIL 20 MG/1
20 TABLET ORAL DAILY
Qty: 90 TABLET | Refills: 3 | Status: SHIPPED | OUTPATIENT
Start: 2018-12-27 | End: 2020-01-13

## 2018-12-27 ASSESSMENT — MIFFLIN-ST. JEOR: SCORE: 1572.93

## 2018-12-27 NOTE — PATIENT INSTRUCTIONS
Blood pressure controlled now.  Continue all medications unchanged.  Low salt, low fat diet.   Exercise: moderate intensity sustained for at least 30 mins per episode, goal of 150 mins per week at least      Call to schedule routine lab tests in 3-4 months (End of March to April 2019); make sure you are fasting for more than 12 hrs.       You may schedule the low dose CT scan to screen for lung cancer screening.    You may contact your cardiologist clinic at the [facility] to request follow up with a new cardiologist in their team since your previous cardiologist has retired.      Lung Cancer Screening   Frequently Asked Questions  If you are at high-risk for lung cancer, getting screened with low-dose computed tomography (LDCT) every year can help save your life. This handout offers answers to some of the most common questions about lung cancer screening. If you have other questions, please call [phone] ([phone]).     What is it?  Lung cancer screening uses special X-ray technology to create an image of your lung tissue. The exam is quick and easy and takes less than 10 seconds. We don t give you any medicine or use any needles. You can eat before and after the exam. You don t need to change your clothes as long as the clothing on your chest doesn t contain metal. But, you do need to be able to hold your breath for at least 6 seconds during the exam.    What is the goal of lung cancer screening?  The goal of lung cancer screening is to save lives. Many times, lung cancer is not found until a person starts having physical symptoms. Lung cancer screening can help detect lung cancer in the earliest stages when it may be easier to treat.    Who should be screened for lung cancer?  We suggest lung cancer screening for anyone who is at high-risk for lung cancer. You are in the high-risk group if you:      are between the ages of 55 and 79, and    have smoked at least 1 pack of cigarettes a day for 30 or  more years, and    still smoke or have quit within the past 15 years.    However, if you have a new cough or shortness of breath, you should talk to your doctor before being screened.    Some national lung health advocacy groups also recommend screening for people ages 50 to 79 who have smoked an average of 1 pack of cigarettes a day for 20 years. They must also have at least 1 other risk factor for lung cancer, not including exposure to secondhand smoke. Other risk factors are having had cancer in the past, emphysema, pulmonary fibrosis, COPD, a family history of lung cancer, or exposure to certain materials such as arsenic, asbestos, beryllium, cadmium, chromium, diesel fumes, nickel, radon or silica. Your care team can help you know if you have one of these risk factors.     Why does it matter if I have symptoms?  Certain symptoms can be a sign that you have a condition in your lungs that should be checked and treated by your doctor. These symptoms include fever, chest pain, a new or changing cough, shortness of breath that you have never felt before, coughing up blood or unexplained weight loss. Having any of these symptoms can greatly affect the results of lung cancer screening.       Should all smokers get an LDCT lung cancer screening exam?  It depends. Lung cancer screening is for a very specific group of men and women who have a history of heavy smoking over a long period of time (see  Who should be screened for lung cancer  above).  I am in the high-risk group, but have been diagnosed with cancer in the past. Is LDCT lung cancer screening right for me?  In some cases, you should not have LDCT lung screening, such as when your doctor is already following your cancer with CT scan studies. Your doctor will help you decide if LDCT lung screening is right for you.  Do I need to have a screening exam every year?  Yes. If you are in the high-risk group described earlier, you should get an LDCT lung cancer  screening exam every year until you are 79, or are no longer willing or able to undergo screening and possible procedures to diagnose and treat lung cancer.  How effective is LDCT at preventing death from lung cancer?  Studies have shown that LDCT lung cancer screening can lower the risk of death from lung cancer by 20 percent in people who are at high-risk.  What are the risks?  There are some risks and limitations of LDCT lung cancer screening. We want to make sure you understand the risks and benefits, so please let us know if you have any questions. Your doctor may want to talk with you more about these risks.    Radiation exposure: As with any exam that uses radiation, there is a very small increased risk of cancer. The amount of radiation in LDCT is small--about the same amount a person would get from a mammogram. Your doctor orders the exam when he or she feels the potential benefits outweigh the risks.    False negatives: No test is perfect, including LDCT. It is possible that you may have a medical condition, including lung cancer, that is not found during your exam. This is called a false negative result.    False positives and more testing: LDCT very often finds something in the lung that could be cancer, but in fact is not. This is called a false positive result. False positive tests often cause anxiety. To make sure these findings are not cancer, you may need to have more tests. These tests will be done only if you give us permission. Sometimes patients need a treatment that can have side effects, such as a biopsy. For more information on false positives, see  What can I expect from the results?     Findings not related to lung cancer: Your LDCT exam also takes pictures of areas of your body next to your lungs. In a very small number of cases, the CT scan will show an abnormal finding in one of these areas, such as your kidneys, adrenal glands, liver or thyroid. This finding may not be serious, but you  may need more tests. Your doctor can help you decide what other tests you may need, if any.  What can I expect from the results?  About 1 out of 4 LDCT exams will find something that may need more tests. Most of the time, these findings are lung nodules. Lung nodules are very small collections of tissue in the lung. These nodules are very common, and the vast majority--more than 97 percent--are not cancer (benign). Most are normal lymph nodes or small areas of scarring from past infections.  But, if a small lung nodule is found to be cancer, the cancer can be cured more than 90 percent of the time. To know if the nodule is cancer, we may need to get more images before your next yearly screening exam. If the nodule has suspicious features (for example, it is large, has an odd shape or grows over time), we will refer you to a specialist for further testing.  Will my doctor also get the results?  Yes. Your doctor will get a copy of your results.  Is it okay to keep smoking now that there s a cancer screening exam?  No. Tobacco is one of the strongest cancer-causing agents. It causes not only lung cancer, but other cancers and cardiovascular (heart) diseases as well. The damage caused by smoking builds over time. This means that the longer you smoke, the higher your risk of disease. While it is never too late to quit, the sooner you quit, the better.  Where can I find help to quit smoking?  The best way to prevent lung cancer is to stop smoking. If you have already quit smoking, congratulations and keep it up! For help on quitting smoking, please call [employer] at 9-470-656-GNIQ ([phone]) or the [employer] at 1-747.964.6719 to find local resources near you.  One-on-one health coaching:  If you d prefer to work individually with a health care provider on tobacco cessation, we offer:      Medication Therapy Management:  Our specially trained pharmacists work closely with you and your doctor to help you quit  smoking.  Call [phone] or [phone] (toll free).     Can Do: Health coaching offered by [facility].  [email or web address]    [email or web address]

## 2018-12-27 NOTE — PROGRESS NOTES

## 2018-12-27 NOTE — PROGRESS NOTES
"  SUBJECTIVE:   Dustin Shah is a 63 year old male who presents to clinic today for the following health issues:  Chief Complaint   Patient presents with     COPD     Pt here for a f/u on copd, due for chest x-ray.     Flu Shot     and td     Hypertension     Pt also here for f/u on b/p med.       Hypertension Follow-up      Outpatient blood pressures are being checked at home.  Results are normal. 120/84    Low Salt Diet: no added salt    COPD Follow-Up    Symptoms are currently: slightly improved    Current fatigue or dyspnea with ambulation: none, some with cold weather    Shortness of breath: stable    Increased or change in Cough/Sputum: Yes-  Little more phlegm, clear    Fever(s): No    Baseline ambulation without stopping to rest:  1 mile. Able to walk up 3 flights of stairs without stopping to rest.    Any ER/UC or hospital admissions since your last visit? No     [facility] states [facility] \"[facility]\".    Patient reports he hardly uses albuterol inhaler if any.    History   Smoking Status     Former Smoker     Packs/day: 1.00     Years: 40.00     Types: Cigarettes     Quit date: 12/8/2013   Smokeless Tobacco     Never Used     No results found for: FEV1, [id number]    Amount of exercise or physical activity: None, none outside of work    Problems taking medications regularly: No    Medication side effects: none    Diet: low salt      Medication Followup of lisinopril, advair    Taking Medication as prescribed: yes    Side Effects:  None    Medication Helping Symptoms:  yes       Problem list and histories reviewed & adjusted, as indicated.  Additional history: as documented    Patient Active Problem List   Diagnosis     HL (hearing loss)     COPD (chronic obstructive pulmonary disease) (H)     CARDIOVASCULAR SCREENING; LDL GOAL LESS THAN 130     Benign essential hypertension     Former smoker     Undiagnosed cardiac murmurs     Hyperlipidemia with target LDL less than 100     Status " post coronary angiogram     Stage 1 mild COPD by GOLD classification (H)     Advanced directives, counseling/discussion     Tubular adenoma of colon     Bicuspid aortic valve     Overweight (BMI 25.0-29.9)     Past Surgical History:   Procedure Laterality Date     COLONOSCOPY       ENT SURGERY      Tonsels     HERNIA REPAIR         Social History     Tobacco Use     Smoking status: Former Smoker     Packs/day: 1.00     Years: 40.00     Pack years: 40.00     Types: Cigarettes     Last attempt to quit: 2013     Years since quittin.0     Smokeless tobacco: Never Used   Substance Use Topics     Alcohol use: Yes     Comment: social     Family History   Problem Relation Age of Onset     Diabetes Mother      Peripheral Vascular Disease Mother         mother w/ PAD     Heart Failure Mother      Heart Failure Maternal Half-Brother          of heart attack age 57     Coronary Artery Disease No family hx of          Current Outpatient Medications   Medication Sig Dispense Refill     aspirin 81 MG tablet Take by mouth daily 30 tablet      calcium carbonate-vitamin D 500-400 MG-UNIT TABS tablt Take 1 tablet by mouth daily 180 tablet 3     fluticasone-salmeterol (ADVAIR DISKUS) 250-50 MCG/DOSE inhaler Inhale 1 puff into the lungs 2 times daily May substitute Generic 1 Inhaler 11     lisinopril (PRINIVIL/ZESTRIL) 20 MG tablet Take 1 tablet (20 mg) by mouth daily Please make an office visit for further refills. 90 tablet 3     omega 3 1000 MG CAPS Take 1 g by mouth daily 90 capsule      albuterol (PROAIR HFA, PROVENTIL HFA, VENTOLIN HFA) 108 (90 BASE) MCG/ACT inhaler Inhale 2 puffs into the lungs every 4 hours as needed for shortness of breath / dyspnea or wheezing 1 Inhaler 6     order for DME Equipment being ordered: BP cuff 1 each prn     Allergies   Allergen Reactions     Nka [No Known Allergies]      BP Readings from Last 3 Encounters:   18 120/84   17 132/86   17 120/86    Wt Readings from Last  "3 Encounters:   12/27/18 80.7 kg (178 lb)   11/13/17 77.6 kg (171 lb)   03/16/17 81.6 kg (180 lb)                    Reviewed and updated as needed this visit by clinical staff  Tobacco  Allergies  Meds  Problems  Med Hx  Surg Hx  Fam Hx  Soc Hx        Reviewed and updated as needed this visit by Provider  Tobacco  Allergies  Meds  Problems  Med Hx  Surg Hx  Fam Hx         ROS:  CONSTITUTIONAL: NEGATIVE for fever, chills, change in weight  INTEGUMENTARY/SKIN: NEGATIVE for worrisome rashes, moles or lesions  EYES: NEGATIVE for vision changes or irritation  ENT/MOUTH: NEGATIVE for ear, mouth and throat problems  RESP: NEGATIVE for significant cough or SOB  CV: NEGATIVE for chest pain, palpitations or peripheral edema  GI: NEGATIVE for nausea, abdominal pain, heartburn, or change in bowel habits  : NEGATIVE for frequency, dysuria, or hematuria  MUSCULOSKELETAL: NEGATIVE for significant arthralgias or myalgia  NEURO: NEGATIVE for weakness, dizziness or paresthesias  ENDOCRINE: NEGATIVE for temperature intolerance, skin/hair changes  HEME: NEGATIVE for bleeding problems  PSYCHIATRIC: NEGATIVE for changes in mood or affect    OBJECTIVE:                                                    /84   Pulse 75   Temp 99.1  F (37.3  C) (Tympanic)   Ht 1.721 m (5' 7.75\")   Wt 80.7 kg (178 lb)   SpO2 92%   BMI 27.26 kg/m    Body mass index is 27.26 kg/m .  GENERAL: alert and no distress, ambulatory w/o assist  NECK: no tenderness, no adenopathy,  Thyroid not enlarged  RESP: lungs clear to auscultation - no rales, no rhonchi, no wheezes  CV: regular rates and rhythm, 2/6 systolic murmur present  MS: no edema  SKIN: no suspicious lesions, no rashes  ABD:  nontender    Diagnostic test results:  Diagnostic Test Results:  Results for orders placed or performed in visit on 06/28/18   Lipid panel reflex to direct LDL Fasting   Result Value Ref Range    Cholesterol 210 (H) <200 mg/dL    Triglycerides 115 <150 " mg/dL    HDL Cholesterol 62 >39 mg/dL    LDL Cholesterol Calculated 125 (H) <100 mg/dL    Non HDL Cholesterol 148 (H) <130 mg/dL        ASSESSMENT/PLAN:                                                        ICD-10-CM    1. Stage 1 mild COPD by GOLD classification (H) J44.9 fluticasone-salmeterol (ADVAIR DISKUS) 250-50 MCG/DOSE inhaler  Stable.  Reinforced when to use albuterol.  Flu shot today.     2. Benign essential hypertension I10 lisinopril (PRINIVIL/ZESTRIL) 20 MG tablet     Basic metabolic panel  Controlled.  Low salt, low fat diet.   Exercise as tolerated.  Take meds as prescribed; call if with side effects.      3. Mixed hyperlipidemia E78.2 Lipid panel reflex to direct LDL Fasting  Reinforced heart healthy lifestyle.     4. Personal history of tobacco use Z87.891 Prof Fee: Shared Decision Making Visit for Lung Cancer Screening     CT Chest Lung Cancer Scrn Low Dose wo  Patient requested follow up screening as recommended by radiologist in 2016.     5. Need for prophylactic vaccination and inoculation against influenza Z23 FLU VACCINE, SPLIT VIRUS, IM (QUADRIVALENT) [96779]- >3 YRS     Vaccine Administration, Initial [32838]   6. Need for vaccination Z23 TD PRSERV FREE >=7 YRS ADS IM [63498]     Each additional admin.  (Right click and add QUANTITY)  [12445]       Follow up with lab 4 months.   Patient Instructions   Blood pressure controlled now.  Continue all medications unchanged.  Low salt, low fat diet.   Exercise: moderate intensity sustained for at least 30 mins per episode, goal of 150 mins per week at least      Call to schedule routine lab tests in 3-4 months (End of March to April 2019); make sure you are fasting for more than 12 hrs.       You may schedule the low dose CT scan to screen for lung cancer screening.    You may contact your cardiologist clinic at the [facility] to request follow up with a new cardiologist in their team since your previous cardiologist has retired.      Lung Cancer  Screening   Frequently Asked Questions  If you are at high-risk for lung cancer, getting screened with low-dose computed tomography (LDCT) every year can help save your life. This handout offers answers to some of the most common questions about lung cancer screening. If you have other questions, please call [phone] ([phone]).     What is it?  Lung cancer screening uses special X-ray technology to create an image of your lung tissue. The exam is quick and easy and takes less than 10 seconds. We don t give you any medicine or use any needles. You can eat before and after the exam. You don t need to change your clothes as long as the clothing on your chest doesn t contain metal. But, you do need to be able to hold your breath for at least 6 seconds during the exam.    What is the goal of lung cancer screening?  The goal of lung cancer screening is to save lives. Many times, lung cancer is not found until a person starts having physical symptoms. Lung cancer screening can help detect lung cancer in the earliest stages when it may be easier to treat.    Who should be screened for lung cancer?  We suggest lung cancer screening for anyone who is at high-risk for lung cancer. You are in the high-risk group if you:      are between the ages of 55 and 79, and    have smoked at least 1 pack of cigarettes a day for 30 or more years, and    still smoke or have quit within the past 15 years.    However, if you have a new cough or shortness of breath, you should talk to your doctor before being screened.    Some national lung health advocacy groups also recommend screening for people ages 50 to 79 who have smoked an average of 1 pack of cigarettes a day for 20 years. They must also have at least 1 other risk factor for lung cancer, not including exposure to secondhand smoke. Other risk factors are having had cancer in the past, emphysema, pulmonary fibrosis, COPD, a family history of lung cancer, or exposure to  certain materials such as arsenic, asbestos, beryllium, cadmium, chromium, diesel fumes, nickel, radon or silica. Your care team can help you know if you have one of these risk factors.     Why does it matter if I have symptoms?  Certain symptoms can be a sign that you have a condition in your lungs that should be checked and treated by your doctor. These symptoms include fever, chest pain, a new or changing cough, shortness of breath that you have never felt before, coughing up blood or unexplained weight loss. Having any of these symptoms can greatly affect the results of lung cancer screening.       Should all smokers get an LDCT lung cancer screening exam?  It depends. Lung cancer screening is for a very specific group of men and women who have a history of heavy smoking over a long period of time (see  Who should be screened for lung cancer  above).  I am in the high-risk group, but have been diagnosed with cancer in the past. Is LDCT lung cancer screening right for me?  In some cases, you should not have LDCT lung screening, such as when your doctor is already following your cancer with CT scan studies. Your doctor will help you decide if LDCT lung screening is right for you.  Do I need to have a screening exam every year?  Yes. If you are in the high-risk group described earlier, you should get an LDCT lung cancer screening exam every year until you are 79, or are no longer willing or able to undergo screening and possible procedures to diagnose and treat lung cancer.  How effective is LDCT at preventing death from lung cancer?  Studies have shown that LDCT lung cancer screening can lower the risk of death from lung cancer by 20 percent in people who are at high-risk.  What are the risks?  There are some risks and limitations of LDCT lung cancer screening. We want to make sure you understand the risks and benefits, so please let us know if you have any questions. Your doctor may want to talk with you more  about these risks.    Radiation exposure: As with any exam that uses radiation, there is a very small increased risk of cancer. The amount of radiation in LDCT is small--about the same amount a person would get from a mammogram. Your doctor orders the exam when he or she feels the potential benefits outweigh the risks.    False negatives: No test is perfect, including LDCT. It is possible that you may have a medical condition, including lung cancer, that is not found during your exam. This is called a false negative result.    False positives and more testing: LDCT very often finds something in the lung that could be cancer, but in fact is not. This is called a false positive result. False positive tests often cause anxiety. To make sure these findings are not cancer, you may need to have more tests. These tests will be done only if you give us permission. Sometimes patients need a treatment that can have side effects, such as a biopsy. For more information on false positives, see  What can I expect from the results?     Findings not related to lung cancer: Your LDCT exam also takes pictures of areas of your body next to your lungs. In a very small number of cases, the CT scan will show an abnormal finding in one of these areas, such as your kidneys, adrenal glands, liver or thyroid. This finding may not be serious, but you may need more tests. Your doctor can help you decide what other tests you may need, if any.  What can I expect from the results?  About 1 out of 4 LDCT exams will find something that may need more tests. Most of the time, these findings are lung nodules. Lung nodules are very small collections of tissue in the lung. These nodules are very common, and the vast majority--more than 97 percent--are not cancer (benign). Most are normal lymph nodes or small areas of scarring from past infections.  But, if a small lung nodule is found to be cancer, the cancer can be cured more than 90 percent of the  time. To know if the nodule is cancer, we may need to get more images before your next yearly screening exam. If the nodule has suspicious features (for example, it is large, has an odd shape or grows over time), we will refer you to a specialist for further testing.  Will my doctor also get the results?  Yes. Your doctor will get a copy of your results.  Is it okay to keep smoking now that there s a cancer screening exam?  No. Tobacco is one of the strongest cancer-causing agents. It causes not only lung cancer, but other cancers and cardiovascular (heart) diseases as well. The damage caused by smoking builds over time. This means that the longer you smoke, the higher your risk of disease. While it is never too late to quit, the sooner you quit, the better.  Where can I find help to quit smoking?  The best way to prevent lung cancer is to stop smoking. If you have already quit smoking, congratulations and keep it up! For help on quitting smoking, please call [employer] at 4-858-214-ALJG ([phone]) or the American Cancer Society at 1-863.653.7015 to find local resources near you.  One-on-one health coaching:  If you d prefer to work individually with a health care provider on tobacco cessation, we offer:      Medication Therapy Management:  Our specially trained pharmacists work closely with you and your doctor to help you quit smoking.  Call [phone] or [phone] (toll free).     Can Do: Health coaching offered by [facility].  [email or web address]    .jada Hauser MD  [facility]  Lung Cancer Screening Shared Decision Making Visit     Dustin Shah is eligible for lung cancer screening on the basis of the information provided in my signed lung cancer screening order.     I have discussed with patient the risks and benefits of screening for lung cancer with low-dose CT.     The risks include:  radiation exposure: one low dose chest CT has as much ionizing  radiation as about 15 chest x-rays or 6 months of background radiation living in [place]    false positives: 96% of positive findings/nodules are NOT cancer, but some might still require additional diagnostic evaluation, including biopsy  over-diagnosis: some slow growing cancers that might never have been clinically significant will be detected and treated unnecessarily     The benefit of early detection of lung cancer is contingent upon adherence to annual screening or more frequent follow up if indicated.     Furthermore, reaping the benefits of screening requires Dustin TIA Alyssa to be willing and physically able to undergo diagnostic procedures, if indicated. Although no specific guide is available for determining severity of comorbidities, it is reasonable to withhold screening in patients who have greater mortality risk from other diseases.     We did discuss that the only way to prevent lung cancer is to not smoke. Smoking cessation assistance was not offered since patient is not using tobacco anymore.    I did offer risk estimation using a calculator such as this one:    ShouldIScreen

## 2018-12-27 NOTE — NURSING NOTE
Screening Questionnaire for Adult Immunization    Are you sick today?   No   Do you have allergies to medications, food, a vaccine component or latex?   No   Have you ever had a serious reaction after receiving a vaccination?   No   Do you have a long-term health problem with heart disease, lung disease, asthma, kidney disease, metabolic disease (e.g. diabetes), anemia, or other blood disorder?   No   Do you have cancer, leukemia, HIV/AIDS, or any other immune system problem?   No   In the past 3 months, have you taken medications that affect  your immune system, such as prednisone, other steroids, or anticancer drugs; drugs for the treatment of rheumatoid arthritis, Crohn s disease, or psoriasis; or have you had radiation treatments?   No   Have you had a seizure, or a brain or other nervous system problem?   No   During the past year, have you received a transfusion of blood or blood     products, or been given immune (gamma) globulin or antiviral drug?   No   For women: Are you pregnant or is there a chance you could become        pregnant during the next month?   No   Have you received any vaccinations in the past 4 weeks?   No     Immunization questionnaire answers were all negative.        Per orders of Dr. Hauser, injection of TD given by Luisa Fitzgerald. Patient instructed to remain in clinic for 15 minutes afterwards, and to report any adverse reaction to me immediately.       Screening performed by Luisa Fitzgerald on 12/27/2018 at 8:26 AM.

## 2018-12-28 ENCOUNTER — HOSPITAL ENCOUNTER (OUTPATIENT)
Dept: CT IMAGING | Facility: CLINIC | Age: 63
Discharge: HOME | End: 2018-12-28
Attending: FAMILY MEDICINE | Admitting: FAMILY MEDICINE
Payer: COMMERCIAL

## 2018-12-28 DIAGNOSIS — Z87.891 PERSONAL HISTORY OF TOBACCO USE: ICD-10-CM

## 2018-12-28 PROCEDURE — G0297 LDCT FOR LUNG CA SCREEN: HCPCS

## 2019-05-17 DIAGNOSIS — I10 BENIGN ESSENTIAL HYPERTENSION: ICD-10-CM

## 2019-05-17 DIAGNOSIS — E78.2 MIXED HYPERLIPIDEMIA: ICD-10-CM

## 2019-05-17 LAB
ANION GAP SERPL CALCULATED.3IONS-SCNC: 4 MMOL/L (ref 3–14)
BUN SERPL-MCNC: 18 MG/DL (ref 7–30)
CALCIUM SERPL-MCNC: 9 MG/DL (ref 8.5–10.1)
CHLORIDE SERPL-SCNC: 103 MMOL/L (ref 94–109)
CHOLEST SERPL-MCNC: 207 MG/DL
CO2 SERPL-SCNC: 29 MMOL/L (ref 20–32)
CREAT SERPL-MCNC: 0.8 MG/DL (ref 0.66–1.25)
GFR SERPL CREATININE-BSD FRML MDRD: >90 ML/MIN/{1.73_M2}
GLUCOSE SERPL-MCNC: 92 MG/DL (ref 70–99)
HDLC SERPL-MCNC: 62 MG/DL
LDLC SERPL CALC-MCNC: 121 MG/DL
NONHDLC SERPL-MCNC: 145 MG/DL
POTASSIUM SERPL-SCNC: 3.8 MMOL/L (ref 3.4–5.3)
SODIUM SERPL-SCNC: 136 MMOL/L (ref 133–144)
TRIGL SERPL-MCNC: 118 MG/DL

## 2019-05-17 PROCEDURE — 36415 COLL VENOUS BLD VENIPUNCTURE: CPT | Performed by: FAMILY MEDICINE

## 2019-05-17 PROCEDURE — 80061 LIPID PANEL: CPT | Performed by: FAMILY MEDICINE

## 2019-05-17 PROCEDURE — 80048 BASIC METABOLIC PNL TOTAL CA: CPT | Performed by: FAMILY MEDICINE

## 2019-08-07 ENCOUNTER — TELEPHONE (OUTPATIENT)
Dept: FAMILY MEDICINE | Facility: CLINIC | Age: 64
End: 2019-08-07

## 2019-08-07 NOTE — TELEPHONE ENCOUNTER
8/7/19- Called patient from [facility] wait list. He has not checked with insurance to see if vaccine is covered. He will call today and then call the clinic and ask for a Team Medical Assistant. - Mayo AYON CMA

## 2019-08-12 NOTE — TELEPHONE ENCOUNTER
8/12/2019 Spoke with patient about Shingrix.  Insurance will cover this.  Patient scheduled on CMA schedule for 8/13/19 at 8:45.  Vaccine in east refrigerator with label.

## 2019-08-13 ENCOUNTER — ALLIED HEALTH/NURSE VISIT (OUTPATIENT)
Dept: FAMILY MEDICINE | Facility: CLINIC | Age: 64
End: 2019-08-13
Payer: COMMERCIAL

## 2019-08-13 DIAGNOSIS — Z23 NEED FOR VACCINATION: Primary | ICD-10-CM

## 2019-08-13 PROCEDURE — 90471 IMMUNIZATION ADMIN: CPT

## 2019-08-13 PROCEDURE — 99207 ZZC NO CHARGE NURSE ONLY: CPT

## 2019-08-13 PROCEDURE — 90750 HZV VACC RECOMBINANT IM: CPT

## 2019-08-13 NOTE — NURSING NOTE
Screening Questionnaire for Adult Immunization    Are you sick today?   No   Do you have allergies to medications, food, a vaccine component or latex?   No   Have you ever had a serious reaction after receiving a vaccination?   No   Do you have a long-term health problem with heart disease, lung disease, asthma, kidney disease, metabolic disease (e.g. diabetes), anemia, or other blood disorder?   No   Do you have cancer, leukemia, HIV/AIDS, or any other immune system problem?   No   In the past 3 months, have you taken medications that affect  your immune system, such as prednisone, other steroids, or anticancer drugs; drugs for the treatment of rheumatoid arthritis, Crohn s disease, or psoriasis; or have you had radiation treatments?   No   Have you had a seizure, or a brain or other nervous system problem?   No   During the past year, have you received a transfusion of blood or blood     products, or been given immune (gamma) globulin or antiviral drug?   No   For women: Are you pregnant or is there a chance you could become        pregnant during the next month?   No   Have you received any vaccinations in the past 4 weeks?   No     Immunization questionnaire answers were all negative.        Per orders of Dr. abdi, injection of shingrix given by Marycarmen Cornejo. Patient instructed to remain in clinic for 15 minutes afterwards, and to report any adverse reaction to me immediately.       Screening performed by Marycarmen Cornejo on 8/13/2019 at 8:52 AM.

## 2020-01-11 DIAGNOSIS — I10 BENIGN ESSENTIAL HYPERTENSION: ICD-10-CM

## 2020-01-13 RX ORDER — LISINOPRIL 20 MG/1
TABLET ORAL
Qty: 30 TABLET | Refills: 0 | Status: SHIPPED | OUTPATIENT
Start: 2020-01-13 | End: 2020-01-21

## 2020-01-14 ENCOUNTER — TELEPHONE (OUTPATIENT)
Dept: FAMILY MEDICINE | Facility: CLINIC | Age: 65
End: 2020-01-14

## 2020-01-14 DIAGNOSIS — J44.9 STAGE 1 MILD COPD BY GOLD CLASSIFICATION (H): ICD-10-CM

## 2020-01-14 NOTE — TELEPHONE ENCOUNTER
"Requested Prescriptions   Pending Prescriptions Disp Refills     ADVAIR DISKUS 250-50 MCG/DOSE inhaler [Pharmacy Med Name: ADVAIR DISKUS 250-50 MCG BLST W/DEV]  Last Written Prescription Date:  12/27/18  Last Fill Quantity: 1,  # refills: 11   Last office visit: 8/13/2019 with prescribing provider:  [facility]   Future Office Visit:           Sig: Inhale 1 Puff by mouthTwice Daily (Rinse mouth after each use)       Inhaled Steroids Protocol Passed - 1/14/2020 10:54 AM        Passed - Patient is age 12 or older        Passed - Recent (12 mo) or future (30 days) visit within the authorizing provider's specialty     Patient has had an office visit with the authorizing provider or a provider within the authorizing providers department within the previous 12 mos or has a future within next 30 days. See \"Patient Info\" tab in inbasket, or \"Choose Columns\" in Meds & Orders section of the refill encounter.              Passed - Medication is active on med list          "

## 2020-01-16 NOTE — TELEPHONE ENCOUNTER
Medication is being filled for 1 time refill only due to:  Patient needs to be seen because due for appt.   Has appt scheduled.  Fawn AYON RN

## 2020-01-16 NOTE — TELEPHONE ENCOUNTER
Reason for Call:  Medication or medication refill:    Do you use a [facility]?  Name of the pharmacy and phone number for the current request:  [facility] [phone]    Name of the medication requested: Pt calling for refill on Advair to get him through until his appt 1/21/20  No need to call patient back, unless there are questions or problems.      Advair  Last Written Prescription Date:  12/27/18  Last Fill Quantity: 1,  # refills: 11   Last office visit: 8/13/2019 with prescribing provider:     Future Office Visit:      Other request:     Can we leave a detailed message on this number? YES    Phone number patient can be reached at: Home number on file [phone] (home)    Best Time: any    Call taken on 1/16/2020 at 7:33 AM by Sherry Aly

## 2020-01-21 ENCOUNTER — OFFICE VISIT (OUTPATIENT)
Dept: FAMILY MEDICINE | Facility: CLINIC | Age: 65
End: 2020-01-21
Payer: COMMERCIAL

## 2020-01-21 VITALS
DIASTOLIC BLOOD PRESSURE: 84 MMHG | HEIGHT: 65 IN | HEART RATE: 69 BPM | WEIGHT: 175.8 LBS | OXYGEN SATURATION: 94 % | BODY MASS INDEX: 29.29 KG/M2 | TEMPERATURE: 97.3 F | RESPIRATION RATE: 14 BRPM | SYSTOLIC BLOOD PRESSURE: 108 MMHG

## 2020-01-21 DIAGNOSIS — Z12.11 SCREENING FOR MALIGNANT NEOPLASM OF COLON: ICD-10-CM

## 2020-01-21 DIAGNOSIS — Z87.891 PERSONAL HISTORY OF TOBACCO USE: ICD-10-CM

## 2020-01-21 DIAGNOSIS — Z23 NEED FOR PROPHYLACTIC VACCINATION AND INOCULATION AGAINST INFLUENZA: ICD-10-CM

## 2020-01-21 DIAGNOSIS — J44.9 STAGE 1 MILD COPD BY GOLD CLASSIFICATION (H): ICD-10-CM

## 2020-01-21 DIAGNOSIS — Z11.4 SCREENING FOR HUMAN IMMUNODEFICIENCY VIRUS: ICD-10-CM

## 2020-01-21 DIAGNOSIS — Z00.00 ROUTINE GENERAL MEDICAL EXAMINATION AT A HEALTH CARE FACILITY: Primary | ICD-10-CM

## 2020-01-21 DIAGNOSIS — M54.31 RIGHT SIDED SCIATICA: ICD-10-CM

## 2020-01-21 DIAGNOSIS — E78.2 MIXED HYPERLIPIDEMIA: ICD-10-CM

## 2020-01-21 DIAGNOSIS — I10 BENIGN ESSENTIAL HYPERTENSION: ICD-10-CM

## 2020-01-21 DIAGNOSIS — J44.9 COPD, MODERATE (H): ICD-10-CM

## 2020-01-21 DIAGNOSIS — Z12.5 SCREENING FOR PROSTATE CANCER: ICD-10-CM

## 2020-01-21 LAB
HIV 1+2 AB+HIV1 P24 AG SERPL QL IA: NONREACTIVE
PSA SERPL-ACNC: 0.81 UG/L (ref 0–4)

## 2020-01-21 PROCEDURE — 36415 COLL VENOUS BLD VENIPUNCTURE: CPT | Performed by: FAMILY MEDICINE

## 2020-01-21 PROCEDURE — 90471 IMMUNIZATION ADMIN: CPT | Performed by: FAMILY MEDICINE

## 2020-01-21 PROCEDURE — G0296 VISIT TO DETERM LDCT ELIG: HCPCS | Performed by: FAMILY MEDICINE

## 2020-01-21 PROCEDURE — 87389 HIV-1 AG W/HIV-1&-2 AB AG IA: CPT | Performed by: FAMILY MEDICINE

## 2020-01-21 PROCEDURE — 90682 RIV4 VACC RECOMBINANT DNA IM: CPT | Performed by: FAMILY MEDICINE

## 2020-01-21 PROCEDURE — 99396 PREV VISIT EST AGE 40-64: CPT | Mod: 25 | Performed by: FAMILY MEDICINE

## 2020-01-21 PROCEDURE — G0103 PSA SCREENING: HCPCS | Performed by: FAMILY MEDICINE

## 2020-01-21 PROCEDURE — 99213 OFFICE O/P EST LOW 20 MIN: CPT | Mod: 25 | Performed by: FAMILY MEDICINE

## 2020-01-21 RX ORDER — ALBUTEROL SULFATE 90 UG/1
2 AEROSOL, METERED RESPIRATORY (INHALATION) EVERY 4 HOURS PRN
Qty: 1 INHALER | Refills: 6 | Status: SHIPPED | OUTPATIENT
Start: 2020-01-21 | End: 2022-02-23

## 2020-01-21 RX ORDER — LISINOPRIL 20 MG/1
20 TABLET ORAL DAILY
Qty: 90 TABLET | Refills: 3 | Status: SHIPPED | OUTPATIENT
Start: 2020-01-21 | End: 2021-02-12

## 2020-01-21 ASSESSMENT — MIFFLIN-ST. JEOR: SCORE: 1518.26

## 2020-01-21 NOTE — PATIENT INSTRUCTIONS
You will be contacted in 1-2 days for results of your lab tests.    Schedule physical therapy.  If no improvement of sciatica after 6-8 weeks of therapy, follow up with a provier.  If with worsening pain, incontinence or leg weakness or numbness, see a provider promptly.    To schedule the CT scan of the chest, call [phone].    Schedule colonoscopy for screening for colon cancer at your soonest convenience.    Be consistent with low salt, low trans fat and low saturated fat diet.  Eat food rich in omega-3-fatty acids as you tolerate. (salmon, olive oil)  Eat 5 cups of vegetables, fruits and whole grains per day.  Limit starchy food (white rice, white bread, white pasta, white potatoes) to less than a cup per meal.  Minimize sweets, junk food and fastfood. Limit soda beverages to one serving per day; best to avoid it altogether though.  Exercise: moderate intensity sustained for at least 30 mins per episode, goal of 150 mins per week at least  Combine cardiovascular and resistance exercises.  These exercise recommendations are in addition to your daily activity at work or home.  Work on losing weight if you are above your goal body mass index.      Schedule fasting lab appointment for cholesterol and metabolic panel in May 2020.      Preventive Health Recommendations  Male Ages 50 - 64    Yearly exam:             See your health care provider every year in order to  o   Review health changes.   o   Discuss preventive care.    o   Review your medicines if your doctor has prescribed any.     Have a cholesterol test every 5 years, or more frequently if you are at risk for high cholesterol/heart disease.     Have a diabetes test (fasting glucose) every three years. If you are at risk for diabetes, you should have this test more often.     Have a colonoscopy at age 50, or have a yearly FIT test (stool test). These exams will check for colon cancer.      Talk with your health care provider about whether or not a  prostate cancer screening test (PSA) is right for you.    You should be tested each year for STDs (sexually transmitted diseases), if you re at risk.     Shots: Get a flu shot each year. Get a tetanus shot every 10 years.     Nutrition:    Eat at least 5 servings of fruits and vegetables daily.     Eat whole-grain bread, whole-wheat pasta and brown rice instead of white grains and rice.     Get adequate Calcium and Vitamin D.     Lifestyle    Exercise for at least 150 minutes a week (30 minutes a day, 5 days a week). This will help you control your weight and prevent disease.     Limit alcohol to one drink per day.     No smoking.     Wear sunscreen to prevent skin cancer.     See your dentist every six months for an exam and cleaning.     See your eye doctor every 1 to 2 years.    Lung Cancer Screening   Frequently Asked Questions  If you are at high-risk for lung cancer, getting screened with low-dose computed tomography (LDCT) every year can help save your life. This handout offers answers to some of the most common questions about lung cancer screening. If you have other questions, please call [phone] ([phone]).     What is it?  Lung cancer screening uses special X-ray technology to create an image of your lung tissue. The exam is quick and easy and takes less than 10 seconds. We don t give you any medicine or use any needles. You can eat before and after the exam. You don t need to change your clothes as long as the clothing on your chest doesn t contain metal. But, you do need to be able to hold your breath for at least 6 seconds during the exam.    What is the goal of lung cancer screening?  The goal of lung cancer screening is to save lives. Many times, lung cancer is not found until a person starts having physical symptoms. Lung cancer screening can help detect lung cancer in the earliest stages when it may be easier to treat.    Who should be screened for lung cancer?  We suggest lung  cancer screening for anyone who is at high-risk for lung cancer. You are in the high-risk group if you:      are between the ages of 55 and 79, and    have smoked at least 1 pack of cigarettes a day for 30 or more years, and    still smoke or have quit within the past 15 years.    However, if you have a new cough or shortness of breath, you should talk to your doctor before being screened.    Some national lung health advocacy groups also recommend screening for people ages 50 to 79 who have smoked an average of 1 pack of cigarettes a day for 20 years. They must also have at least 1 other risk factor for lung cancer, not including exposure to secondhand smoke. Other risk factors are having had cancer in the past, emphysema, pulmonary fibrosis, COPD, a family history of lung cancer, or exposure to certain materials such as arsenic, asbestos, beryllium, cadmium, chromium, diesel fumes, nickel, radon or silica. Your care team can help you know if you have one of these risk factors.     Why does it matter if I have symptoms?  Certain symptoms can be a sign that you have a condition in your lungs that should be checked and treated by your doctor. These symptoms include fever, chest pain, a new or changing cough, shortness of breath that you have never felt before, coughing up blood or unexplained weight loss. Having any of these symptoms can greatly affect the results of lung cancer screening.       Should all smokers get an LDCT lung cancer screening exam?  It depends. Lung cancer screening is for a very specific group of men and women who have a history of heavy smoking over a long period of time (see  Who should be screened for lung cancer  above).  I am in the high-risk group, but have been diagnosed with cancer in the past. Is LDCT lung cancer screening right for me?  In some cases, you should not have LDCT lung screening, such as when your doctor is already following your cancer with CT scan studies. Your doctor  will help you decide if LDCT lung screening is right for you.  Do I need to have a screening exam every year?  Yes. If you are in the high-risk group described earlier, you should get an LDCT lung cancer screening exam every year until you are 79, or are no longer willing or able to undergo screening and possible procedures to diagnose and treat lung cancer.  How effective is LDCT at preventing death from lung cancer?  Studies have shown that LDCT lung cancer screening can lower the risk of death from lung cancer by 20 percent in people who are at high-risk.  What are the risks?  There are some risks and limitations of LDCT lung cancer screening. We want to make sure you understand the risks and benefits, so please let us know if you have any questions. Your doctor may want to talk with you more about these risks.    Radiation exposure: As with any exam that uses radiation, there is a very small increased risk of cancer. The amount of radiation in LDCT is small--about the same amount a person would get from a mammogram. Your doctor orders the exam when he or she feels the potential benefits outweigh the risks.    False negatives: No test is perfect, including LDCT. It is possible that you may have a medical condition, including lung cancer, that is not found during your exam. This is called a false negative result.    False positives and more testing: LDCT very often finds something in the lung that could be cancer, but in fact is not. This is called a false positive result. False positive tests often cause anxiety. To make sure these findings are not cancer, you may need to have more tests. These tests will be done only if you give us permission. Sometimes patients need a treatment that can have side effects, such as a biopsy. For more information on false positives, see  What can I expect from the results?     Findings not related to lung cancer: Your LDCT exam also takes pictures of areas of your body next to  your lungs. In a very small number of cases, the CT scan will show an abnormal finding in one of these areas, such as your kidneys, adrenal glands, liver or thyroid. This finding may not be serious, but you may need more tests. Your doctor can help you decide what other tests you may need, if any.  What can I expect from the results?  About 1 out of 4 LDCT exams will find something that may need more tests. Most of the time, these findings are lung nodules. Lung nodules are very small collections of tissue in the lung. These nodules are very common, and the vast majority--more than 97 percent--are not cancer (benign). Most are normal lymph nodes or small areas of scarring from past infections.  But, if a small lung nodule is found to be cancer, the cancer can be cured more than 90 percent of the time. To know if the nodule is cancer, we may need to get more images before your next yearly screening exam. If the nodule has suspicious features (for example, it is large, has an odd shape or grows over time), we will refer you to a specialist for further testing.  Will my doctor also get the results?  Yes. Your doctor will get a copy of your results.  Is it okay to keep smoking now that there s a cancer screening exam?  No. Tobacco is one of the strongest cancer-causing agents. It causes not only lung cancer, but other cancers and cardiovascular (heart) diseases as well. The damage caused by smoking builds over time. This means that the longer you smoke, the higher your risk of disease. While it is never too late to quit, the sooner you quit, the better.  Where can I find help to quit smoking?  The best way to prevent lung cancer is to stop smoking. If you have already quit smoking, congratulations and keep it up! For help on quitting smoking, please call [employer] at 3-749-313-UPRY ([phone]) or the [employer] at 1-674.494.7270 to find local resources near you.  One-on-one health coaching:  If you d prefer to  "work individually with a health care provider on tobacco cessation, we offer:      Medication Therapy Management:  Our specially trained pharmacists work closely with you and your doctor to help you quit smoking.  Call [phone] or [phone] (toll free).     Can Do: Health coaching offered by [facility].  [email or web address]    Patient Education       * Sciatica    Sciatica (\"Lumbar Radiculopathy\") causes a pain that spreads from the lower back down into the buttock, hip and leg. Sometimes leg pain can occur without any back pain. Sciatica is due to irritation or pressure on a spinal nerve as it comes out of the spinal canal. This is most often due to pressure on the nerve from a nearby spinal disk (the cartilage cushion between each spinal bone). Other causes include spinal stenosis (narrowing of the spinal canal) and spasm of the piriformis muscle (a muscle in the buttocks that the sciatic nerve passes through).  Sciatica may begin after a sudden twisting/bending force (such as in a car accident), or sometimes after a simple awkward movement. In either case, muscle spasm is commonly present and can add to the pain.  The diagnosis of sciatica is made from the symptoms and physical exam. Unless you had a physical injury (such as a car accident or fall), X-rays are usually not ordered for the initial evaluation of sciatica because the nerves and disks cannot be seen on an X-ray. Most sciatica (80-90%) gets better with time.  What can I do about my low back pain?  There are three main things you can do to ease low back pain and help it go away.    Stay active! Use positions, movements and exercises. Too much rest can make your symptoms worse.    Use heat or cold packs.    Take medicine as directed.  Using positions, movements and exercises  Research tells us that moving your joints and muscles can help you recover from back pain. Such activity should be simple and gentle.  Use walking to " help relieve your discomfort. Try taking a short walk every 3 to 4 hours during the day. Walk for a few minutes inside your home or take longer walks outside, on a treadmill or at a mall. Slowly increase the amount of time you walk. Expect discomfort when you begin, but it should lessen as your back starts to recover.  Finding a position that is comfortable  When your back pain is new, you may find that certain positions will ease your pain. Gently try each of the following positions until you find one that eases your pain. Once you find a position of comfort, use it as often as you like while you recover. Return to your daily routine as soon as possible.     Lie on your back with your legs bent. You can do this by placing a pillow under your knees or lie on the floor and rest your lower legs on the seat of a chair.    Lie on your side with your knees bent and place a pillow between your knees.    Lie on your stomach over pillows.  Using heat or cold packs  Try cold packs or gentle heat to ease your pain. Use whichever gives you the most relief. Apply the cold pack or heat for 15 minutes at a time, as often as needed.  Taking medicine  If taking over-the-counter medicine:    Take ibuprofen (Advil, Motrin) 600 mg. three times a day as needed for pain.  OR    Take Aleve (naproxen sodium) 220 to 440 mg. two times a day as needed for pain.  If your doctor prescribed medicine, follow the instructions. Stop taking the medicine as soon as you can.  When should I call my doctor?  Your back pain should improve over the first couple of weeks. As it improves, you should be able to return to your normal activities. But call your doctor if:    You have a sudden change in your ability to control? your bladder or bowels.    You begin to feel tingling in your groin or legs.    The pain spreads down your leg and into your foot.    Your toes, feet or leg muscles begin to feel weak.    You feel generally unwell or sick.    Your pain  gets worse.    2944-0585 [employer]. [address] [place] [address], [place] [address]. All rights reserved. This information is not intended as a substitute for professional medical care. Always follow your healthcare professional's instructions. This information has been modified by your health care provider with permission from the publisher.

## 2020-01-21 NOTE — PROGRESS NOTES
3  SUBJECTIVE:   CC: Dustin Shah is an 64 year old male who presents for preventive health visit.     Healthy Habits:    Do you get at least three servings of calcium containing foods daily (dairy, green leafy vegetables, etc.)? no, taking calcium and/or vitamin D supplement: yes - takes calcium    Amount of exercise or daily activities, outside of work: none    Problems taking medications regularly No    Medication side effects: No    Have you had an eye exam in the past two years? yes    Do you see a dentist twice per year? yes    Do you have sleep apnea, excessive snoring or daytime drowsiness?no    Patient was advised that any concern beyond preventive/wellness screenings or items may incur secondary charges in his medical bill. Patient concurred to proceed with the following:  Sciatica pain      Duration: past couple months, intermittent - occurs about 1-2 x a day, few days a week; same intensity and frequency up to now    Description (location/character/radiation): pt gets a pain that goes from right buttocks into calve area, no lower back pain    Intensity:  5/10    Accompanying signs and symptoms: tingling in calve and ankle on right leg    History (similar episodes/previous evaluation): None    Precipitating or alleviating factors: if pt walks or sits down it will go away    Therapies tried and outcome: None    Patient denies specific trauma or incident that involved back or leg.    No effect on ADLs yet.    Denies urinary or bowel incontinence or retention.         Today's PHQ-2 Score:   PHQ-2 ( 1999 [employer]) 1/21/2020 12/27/2018   Q1: Little interest or pleasure in doing things 0 0   Q2: Feeling down, depressed or hopeless 0 0   PHQ-2 Score 0 0   Q1: Little interest or pleasure in doing things - -   Q2: Feeling down, depressed or hopeless - -   PHQ-2 Score - -       Abuse: Current or Past(Physical, Sexual or Emotional)- No  Do you feel safe in your environment? Yes        Social History     Tobacco Use      Smoking status: Former Smoker     Packs/day: 1.00     Years: 40.00     Pack years: 40.00     Types: Cigarettes     Last attempt to quit: 2013     Years since quittin.1     Smokeless tobacco: Never Used   Substance Use Topics     Alcohol use: Yes     Comment: social     If you drink alcohol do you typically have >3 drinks per day or >7 drinks per week? No                      Last PSA:   PSA   Date Value Ref Range Status   2013 0.89 0 - 4 ug/L Final       Reviewed orders with patient. Reviewed health maintenance and updated orders accordingly - Yes  BP Readings from Last 3 Encounters:   20 108/84   18 120/84   17 132/86    Wt Readings from Last 3 Encounters:   20 79.7 kg (175 lb 12.8 oz)   18 80.7 kg (178 lb)   17 77.6 kg (171 lb)                  Patient Active Problem List   Diagnosis     HL (hearing loss)     COPD (chronic obstructive pulmonary disease) (H)     CARDIOVASCULAR SCREENING; LDL GOAL LESS THAN 130     Benign essential hypertension     Former smoker     Undiagnosed cardiac murmurs     Hyperlipidemia with target LDL less than 100     Status post coronary angiogram     Stage 1 mild COPD by GOLD classification (H)     Advanced directives, counseling/discussion     Tubular adenoma of colon     Bicuspid aortic valve     Overweight (BMI 25.0-29.9)     Past Surgical History:   Procedure Laterality Date     COLONOSCOPY       ENT SURGERY      Tonsels     HERNIA REPAIR         Social History     Tobacco Use     Smoking status: Former Smoker     Packs/day: 1.00     Years: 40.00     Pack years: 40.00     Types: Cigarettes     Last attempt to quit: 2013     Years since quittin.1     Smokeless tobacco: Never Used   Substance Use Topics     Alcohol use: Yes     Comment: social     Family History   Problem Relation Age of Onset     Diabetes Mother      Peripheral Vascular Disease Mother         mother w/ PAD     Heart Failure Mother      Heart Failure  Maternal Half-Brother          of heart attack age 57     Coronary Artery Disease No family hx of          Current Outpatient Medications   Medication Sig Dispense Refill     ADVAIR DISKUS 250-50 MCG/DOSE inhaler Inhale 1 Puff by mouthTwice Daily (Rinse mouth after each use) 1 Inhaler 0     aspirin 81 MG tablet Take by mouth daily 30 tablet      calcium carbonate-vitamin D 500-400 MG-UNIT TABS tablt Take 1 tablet by mouth daily 180 tablet 3     lisinopril (PRINIVIL/ZESTRIL) 20 MG tablet Take 1 Tablet BY MOUTH EVERY DAY 30 tablet 0     omega 3 1000 MG CAPS Take 1 g by mouth daily 90 capsule      albuterol (PROAIR HFA, PROVENTIL HFA, VENTOLIN HFA) 108 (90 BASE) MCG/ACT inhaler Inhale 2 puffs into the lungs every 4 hours as needed for shortness of breath / dyspnea or wheezing 1 Inhaler 6     order for DME Equipment being ordered: BP cuff 1 each prn     Allergies   Allergen Reactions     Nka [No Known Allergies]        Reviewed and updated as needed this visit by clinical staff  Tobacco  Allergies  Meds  Med Hx  Surg Hx  Fam Hx  Soc Hx        Reviewed and updated as needed this visit by Provider        Past Medical History:   Diagnosis Date     Bicuspid aortic valve      COPD (chronic obstructive pulmonary disease) (H) 10/2015     Hyperlipidemia 10/2015     Hypertension 10/2015      Past Surgical History:   Procedure Laterality Date     COLONOSCOPY       ENT SURGERY      Tonsels     HERNIA REPAIR         ROS:  CONSTITUTIONAL: NEGATIVE for fever, chills, change in weight  INTEGUMENTARY/SKIN: NEGATIVE for worrisome rashes, moles or lesions  EYES: NEGATIVE for vision changes or irritation  ENT: NEGATIVE for ear, mouth and throat problems  RESP: NEGATIVE for significant cough or SOB  CV: NEGATIVE for chest pain, palpitations or peripheral edema  GI: NEGATIVE for nausea, abdominal pain, heartburn, or change in bowel habits   male: negative for dysuria, hematuria, decreased urinary stream, erectile dysfunction,  "urethral discharge  MUSCULOSKELETAL: see above  NEURO: NEGATIVE for weakness, dizziness or paresthesias  ENDOCRINE: NEGATIVE for temperature intolerance, skin/hair changes  HEME/ALLERGY/IMMUNE: NEGATIVE for bleeding problems  PSYCHIATRIC: NEGATIVE for changes in mood or affect    OBJECTIVE:   /84   Pulse 69   Temp 97.3  F (36.3  C) (Tympanic)   Resp 14   Ht 1.657 m (5' 5.25\")   Wt 79.7 kg (175 lb 12.8 oz)   SpO2 94%   BMI 29.03 kg/m    EXAM:  GENERAL APPEARANCE: alert and no distress  EYES: pink conj, no icterus, PERRL, EOMI  HENT: ear canals and TM's normal, nose and mouth without ulcers or lesions, oropharynx clear and oral mucous membranes moist  NECK: no adenopathy, no asymmetry, masses, or scars and thyroid normal to palpation  RESP: lungs clear to auscultation - no rales, rhonchi or wheezes  CV: regular rates and rhythm, normal S1 S2, no S3 or S4, no murmur, click or rub, no peripheral edema and peripheral pulses strong  ABDOMEN: soft, nontender, no hepatosplenomegaly, no masses and bowel sounds normal  RECTAL: deferred  MS: no musculoskeletal defects are noted and gait is age appropriate without ataxia  SKIN: no suspicious lesions or rashes  NEURO: Normal strength and tone, sensory exam grossly normal, mentation intact and speech normal  BACK: no deformity, no TTP; SLR negative bilaterally - patient reports only tightness of the hamstrings bilaterally on elevation of either leg    Diagnostic Test Results:  Labs reviewed in Epic    ASSESSMENT/PLAN:   Dustin was seen today for physical, flu shot, health maintenance and imm/inj.    Diagnoses and all orders for this visit:    Routine general medical examination at a health care facility  Patient was advised on recommended screening and preventive health recommendations.  He verbalized understanding and agreed to the plans below.    Right sided sciatica  -     PHYSICAL THERAPY REFERRAL; Future  No cauda equina red flags. Benign exam today.  Discussed " causes, course and possible treatments.  No direct trauma to back; no clear indication for imaging.  Discussed safe use of NSAIDs - minimize use; use APAP rather than NSAIDs.  Activity modification discussed.  Return precautions discussed and given to patient.    COPD, moderate (H)  -     albuterol (PROAIR HFA/PROVENTIL HFA/VENTOLIN HFA) 108 (90 Base) MCG/ACT inhaler; Inhale 2 puffs into the lungs every 4 hours as needed for shortness of breath / dyspnea or wheezing  -     fluticasone-salmeterol (ADVAIR DISKUS) 250-50 MCG/DOSE inhaler; Inhale 1 puff into the lungs 2 times daily    Stable per patient.  Flu shot today.    Benign essential hypertension  -     lisinopril (PRINIVIL/ZESTRIL) 20 MG tablet; Take 1 tablet (20 mg) by mouth daily  -     Basic metabolic panel; Future  Controlled.  Low salt, low fat diet.   Exercise as tolerated.  Take meds as prescribed; call if with side effects.     Need for prophylactic vaccination and inoculation against influenza  -     INFLUENZA QUAD, RECOMBINANT, P-FREE (RIV4) (FLUBLOCK) [07396]  -     Vaccine Administration, Initial [51497]    Screening for malignant neoplasm of colon  -     GASTROENTEROLOGY ADULT REF PROCEDURE ONLY [facility] [phone]; [place] [facility]    Mixed hyperlipidemia  -     Lipid panel reflex to direct LDL Fasting; Future    Screening for prostate cancer  -     Prostate spec antigen screen    Screening for human immunodeficiency virus  -     HIV Antigen Antibody Combo    Personal history of tobacco use  -     Prof Fee: Shared Decision Making Visit for Lung Cancer Screening  -     CT Chest Lung Cancer Scrn Low Dose wo; Future    In addition to preventive health visit, spent another 15 minutes in counseling and coordination of care as above.      COUNSELING:  Reviewed preventive health counseling, as reflected in patient instructions       Regular exercise       Healthy diet/nutrition       Vision screening       Hearing screening        "Immunizations    Vaccinated for: Influenza             Aspirin Prophylaxsis       Alcohol Use       Safe sex practices/STD prevention       HIV screeninx in teen years, 1x in adult years, and at intervals if high risk       Colon cancer screening       Prostate cancer screening       Osteoporosis Prevention/Bone Health       Consider lung cancer screening for ages 55-80 years and 30 pack-year smoking history         The 10-year ASCVD risk score (Meka HIGGINS Jr., et al., 2013) is: 9.3%    Values used to calculate the score:      Age: 64 years      Sex: Male      Is Non- : No      Diabetic: No      Tobacco smoker: No      Systolic Blood Pressure: 108 mmHg      Is BP treated: Yes      HDL Cholesterol: 62 mg/dL      Total Cholesterol: 207 mg/dL       Advance Care Planning    Estimated body mass index is 29.03 kg/m  as calculated from the following:    Height as of this encounter: 1.657 m (5' 5.25\").    Weight as of this encounter: 79.7 kg (175 lb 12.8 oz).    Weight management plan: Discussed healthy diet and exercise guidelines     reports that he quit smoking about 6 years ago. His smoking use included cigarettes. He has a 40.00 pack-year smoking history. He has never used smokeless tobacco.      Counseling Resources:  ATP IV Guidelines  Pooled Cohorts Equation Calculator  FRAX Risk Assessment  ICSI Preventive Guidelines  Dietary Guidelines for Americans, 2010  USDA's MyPlate  ASA Prophylaxis  Lung CA Screening    Kirill Hauser MD  [facility]  Lung Cancer Screening Shared Decision Making Visit     Dustin Shah is eligible for lung cancer screening on the basis of the information provided in my signed lung cancer screening order.     I have discussed with patient the risks and benefits of screening for lung cancer with low-dose CT.     The risks include:  radiation exposure: one low dose chest CT has as much ionizing radiation as about 15 chest x-rays or 6 months of " background radiation living in [place]    false positives: 96% of positive findings/nodules are NOT cancer, but some might still require additional diagnostic evaluation, including biopsy  over-diagnosis: some slow growing cancers that might never have been clinically significant will be detected and treated unnecessarily     The benefit of early detection of lung cancer is contingent upon adherence to annual screening or more frequent follow up if indicated.     Furthermore, reaping the benefits of screening requires Dustin Shah to be willing and physically able to undergo diagnostic procedures, if indicated. Although no specific guide is available for determining severity of comorbidities, it is reasonable to withhold screening in patients who have greater mortality risk from other diseases.     We did discuss that the only way to prevent lung cancer is to not smoke. Smoking cessation assistance was not offered.    I did offer risk estimation using a calculator such as this one:    ShouldIScreen

## 2020-01-24 ENCOUNTER — HOSPITAL ENCOUNTER (OUTPATIENT)
Dept: CT IMAGING | Facility: CLINIC | Age: 65
Discharge: HOME | End: 2020-01-24
Attending: FAMILY MEDICINE | Admitting: FAMILY MEDICINE
Payer: COMMERCIAL

## 2020-01-24 ENCOUNTER — HOSPITAL ENCOUNTER (OUTPATIENT)
Dept: PHYSICAL THERAPY | Facility: CLINIC | Age: 65
Setting detail: THERAPIES SERIES
End: 2020-01-24
Attending: FAMILY MEDICINE
Payer: COMMERCIAL

## 2020-01-24 DIAGNOSIS — Z87.891 PERSONAL HISTORY OF TOBACCO USE: ICD-10-CM

## 2020-01-24 DIAGNOSIS — M54.31 RIGHT SIDED SCIATICA: ICD-10-CM

## 2020-01-24 PROCEDURE — 97140 MANUAL THERAPY 1/> REGIONS: CPT | Mod: GP,59 | Performed by: PHYSICAL THERAPIST

## 2020-01-24 PROCEDURE — 97110 THERAPEUTIC EXERCISES: CPT | Mod: GP | Performed by: PHYSICAL THERAPIST

## 2020-01-24 PROCEDURE — G0297 LDCT FOR LUNG CA SCREEN: HCPCS

## 2020-01-24 PROCEDURE — 97161 PT EVAL LOW COMPLEX 20 MIN: CPT | Mod: GP | Performed by: PHYSICAL THERAPIST

## 2020-01-24 NOTE — PROGRESS NOTES
01/24/20 0800   General Information   Type of Visit Initial OP Ortho PT Evaluation   Start of Care Date 01/24/20   Referring Physician Kirill Hauser   Patient/Family Goals Statement get rid of pain/tingling    Orders Evaluate and Treat   Date of Order 01/21/20   Certification Required? No   Medical Diagnosis right sided sciatica   Surgical/Medical history reviewed Yes   Precautions/Limitations no known precautions/limitations   General Information Comments PMH: hernia repair, ankle surgery 2017, COPD, smoking, HTN   Body Part(s)   Body Part(s) Lumbar Spine/SI   Presentation and Etiology   Pertinent history of current problem (include personal factors and/or comorbidities that impact the POC) HE has been getting some pain and tingling down the right leg into the calf. It's been going on for a couple months ago, doesn't remember anything that brought it on. Pain has gotten a little worse. Standing still make sit worse. Walking and rolling makes it feel better. NO changes in strength noted.    Impairments A. Pain;L. Tingling   Functional Limitations perform activities of daily living   Symptom Location right leg   How/Where did it occur From insidious onset   Onset date of current episode/exacerbation 01/21/20  (date of order)   Chronicity New   Pain rating (0-10 point scale) Best (/10);Worst (/10)   Best (/10) 0   Worst (/10) 5   Pain quality C. Aching;G. Cramping   Frequency of pain/symptoms B. Intermittent   Pain/symptoms are: Worse during the night   Pain/symptoms exacerbated by E. Rest;M. Other  (standing)   Pain/symptoms eased by A. Sitting;B. Walking   Progression of symptoms since onset: Worsened   Prior Level of Function   Prior Level of Function-Mobility indp    Prior Level of Function-ADLs indp    Functional Level Prior Comment indp    Current Level of Function   Current Community Support Family/friend caregiver   Patient role/employment history A. Employed   Employment Comments - works nights    Living environment [facility]   Home/community accessibility no concerns   Current equipment-Gait/Locomotion None   Current equipment-ADL None   Fall Risk Screen   Fall screen completed by PT   Have you fallen 2 or more times in the past year? No   Have you fallen and had an injury in the past year? No   Is patient a fall risk? No   Fall screen comments slipped on the ice one time    Abuse Screen (yes response referral indicated)   Feels Unsafe at Home or Work/School no   Feels Threatened by Someone no   Does Anyone Try to Keep You From Having Contact with Others or Doing Things Outside Your Home? no   Physical Signs of Abuse Present no   Functional Scales   Functional Scales Other   Lumbar Spine/SI Objective Findings   Integumentary no concerns   Posture right shoulder sits higher than left    Gait/Locomotion trendelenburg to the right    Flexion ROM 0 % limited- stretching   Extension ROM 0% limited no pain    Right Side Bending ROM 0% limited no pain    Left Side Bending ROM 0% limited no pain    Repeated Extension-Standing ROM no change in pain    Repeated Flexion-Standing ROM no change in pain    Pelvic Screen SI testing: compression, distraction and approximation all negative    Hip Screen Scour negative RBOUBACAR: stretching felt in right LE, FADIR stretching into right buttock    Hip Flexion (L2) Strength 5/5 L, 4+/5 R    Hip Abduction Strength 4-/5 Right,  4+/5 L    Hip Adduction Strength 5/5 B    Hip Extension Strength 4-/5 Right, 4+/5 L    Knee Flexion Strength 5/5 B    Knee Extension (L3) Strength 5/5 B    Ankle Dorsiflexion (L4) Strength 5/5 B    Ankle Plantar Flexion (S1) Strength difficulty with toe walking- increased pain in buttok area    Hamstring Flexibility WNL B    Hip Flexor Flexibility WNL B    Quadricep Flexibility WNL B    Piriformis Flexibility moderate tightness right    SLR negative Right    Shaw Test negative R    Ely Test negative Right    Slump Test negative    Spring Test mild  pain with spring testing over L3   Segmental Mobility normal mobility over lumbar spine    Sensation Testing WnL B to light touch    Palpation tender to palpation over right piriformis and glut med.    Planned Therapy Interventions   Planned Therapy Interventions balance training;bed mobility training;gait training;joint mobilization;manual therapy;neuromuscular re-education;ROM;strengthening;stretching;transfer training   Clinical Impression   Criteria for Skilled Therapeutic Interventions Met yes, treatment indicated   PT Diagnosis piriformis syndrome R    Influenced by the following impairments decreased right hip flexibility, tender to palpation over the right glut med/piriformis and glut min, decreased strength R LE, impaired gait    Functional limitations due to impairments driving the car, sitting, standing   Clinical Presentation Stable/Uncomplicated   Clinical Presentation Rationale clinical decision making, chart revieiw   Clinical Decision Making (Complexity) Low complexity   Therapy Frequency 1 time/week   Predicted Duration of Therapy Intervention (days/wks) 8 weeks   Risk & Benefits of therapy have been explained Yes   Patient, Family & other staff in agreement with plan of care Yes   Clinical Impression Comments Patient is a 64 year old male who presents with right sided radiating pain. Signs and symptoms are consistent with right piriformis syndrome.    Education Assessment   Preferred Learning Style Listening;Demonstration;Pictures/video   Barriers to Learning No barriers   ORTHO GOALS   PT Ortho Eval Goals 1;2;3   Ortho Goal 1   Goal Identifier 1   Goal Description Patient will be independent with HEP in order to continue strengthening outside of physical therapy.    Target Date 02/21/20   Ortho Goal 2   Goal Identifier 2   Goal Description Patient will be able to drive to/from work wihtout increased right leg pain.    Target Date 03/20/20   Ortho Goal 3   Goal Identifier 3   Goal Description  Patient will be able to stand for 1 hour wihtout increased right leg pain in order to complete work duties.    Target Date 03/20/20   Total Evaluation Time   PT Eval, Low Complexity Minutes (78643) 24   Juana Jiménez PT DPT  1/24/2020

## 2020-01-31 ENCOUNTER — HOSPITAL ENCOUNTER (OUTPATIENT)
Dept: PHYSICAL THERAPY | Facility: CLINIC | Age: 65
Setting detail: THERAPIES SERIES
End: 2020-01-31
Attending: FAMILY MEDICINE
Payer: COMMERCIAL

## 2020-01-31 PROCEDURE — 97110 THERAPEUTIC EXERCISES: CPT | Mod: GP | Performed by: PHYSICAL THERAPIST

## 2020-02-07 ENCOUNTER — HOSPITAL ENCOUNTER (OUTPATIENT)
Dept: PHYSICAL THERAPY | Facility: CLINIC | Age: 65
Setting detail: THERAPIES SERIES
End: 2020-02-07
Attending: FAMILY MEDICINE
Payer: COMMERCIAL

## 2020-02-07 PROCEDURE — 97110 THERAPEUTIC EXERCISES: CPT | Mod: GP | Performed by: PHYSICAL THERAPIST

## 2020-02-10 ENCOUNTER — ANESTHESIA EVENT (OUTPATIENT)
Dept: GASTROENTEROLOGY | Facility: CLINIC | Age: 65
End: 2020-02-10
Payer: COMMERCIAL

## 2020-02-10 ASSESSMENT — COPD QUESTIONNAIRES: COPD: 1

## 2020-02-10 ASSESSMENT — LIFESTYLE VARIABLES: TOBACCO_USE: 1

## 2020-02-10 NOTE — ANESTHESIA PREPROCEDURE EVALUATION
Anesthesia Pre-Procedure Evaluation    Patient: Dustin Shah   MRN: [id number] : 1955          Preoperative Diagnosis: Special screening for malignant neoplasms, colon [Z12.11]    Procedure(s):  COLONOSCOPY    Past Medical History:   Diagnosis Date     Bicuspid aortic valve      COPD (chronic obstructive pulmonary disease) (H) 10/2015     Hyperlipidemia 10/2015     Hypertension 10/2015     Past Surgical History:   Procedure Laterality Date     COLONOSCOPY       ENT SURGERY      Tonsels     HERNIA REPAIR         Anesthesia Evaluation     . Pt has had prior anesthetic.     No history of anesthetic complications          ROS/MED HX    ENT/Pulmonary:     (+)tobacco use, Past use mild COPD, , . .    Neurologic:  - neg neurologic ROS     Cardiovascular:     (+) Dyslipidemia, hypertension----. : . . . :. valvular problems/murmurs .       METS/Exercise Tolerance:  >4 METS   Hematologic:         Musculoskeletal:  - neg musculoskeletal ROS       GI/Hepatic:  - neg GI/hepatic ROS       Renal/Genitourinary:  - ROS Renal section negative       Endo:     (+) Obesity, .      Psychiatric:  - neg psychiatric ROS       Infectious Disease:  - neg infectious disease ROS       Malignancy:      - no malignancy   Other:    - neg other ROS                      Physical Exam  Normal systems: cardiovascular and pulmonary    Airway   Mallampati: II  TM distance: >3 FB  Neck ROM: full    Dental   (+) missing    Cardiovascular       Pulmonary             Lab Results   Component Value Date    WBC 9.6 2015    HGB 16.2 2015    HCT 47.9 2015     2015     2019    POTASSIUM 3.8 2019    CHLORIDE 103 2019    CO2 29 2019    BUN 18 2019    CR 0.80 2019    GLC 92 2019    OTTO 9.0 2019    ALBUMIN 4.8 (H) 2008    PROTTOTAL 8.0 2008    ALT 24 2008    AST 30 2008    ALKPHOS 83 2008    BILITOTAL 0.3 2008    TSH 1.17 10/30/2015  "      Preop Vitals  BP Readings from Last 3 Encounters:   01/21/20 108/84   12/27/18 120/84   11/13/17 132/86    Pulse Readings from Last 3 Encounters:   01/21/20 69   12/27/18 75   11/13/17 88      Resp Readings from Last 3 Encounters:   01/21/20 14   12/16/16 14   09/01/16 12    SpO2 Readings from Last 3 Encounters:   01/21/20 94%   12/27/18 92%   11/13/17 95%      Temp Readings from Last 1 Encounters:   01/21/20 36.3  C (97.3  F) (Tympanic)    Ht Readings from Last 1 Encounters:   01/21/20 1.657 m (5' 5.25\")      Wt Readings from Last 1 Encounters:   01/21/20 79.7 kg (175 lb 12.8 oz)    Estimated body mass index is 29.03 kg/m  as calculated from the following:    Height as of 1/21/20: 1.657 m (5' 5.25\").    Weight as of 1/21/20: 79.7 kg (175 lb 12.8 oz).       Anesthesia Plan      History & Physical Review  History and physical reviewed and following examination; no interval change.    ASA Status:  3 .    NPO Status:  > 8 hours    Plan for General and MAC with Propofol and Intravenous induction. Maintenance will be Balanced.  Reason for MAC:  Deep or markedly invasive procedure (G8)  PONV prophylaxis:  Ondansetron (or other 5HT-3) and Dexamethasone or Solumedrol       Postoperative Care  Postoperative pain management:  IV analgesics and Oral pain medications.      Consents  Anesthetic plan, risks, benefits and alternatives discussed with:  Patient and Spouse..                 Parag Sahu, CRNA, APRN CRNA  "

## 2020-02-14 ENCOUNTER — ANESTHESIA (OUTPATIENT)
Dept: GASTROENTEROLOGY | Facility: CLINIC | Age: 65
End: 2020-02-14
Payer: COMMERCIAL

## 2020-02-14 ENCOUNTER — HOSPITAL ENCOUNTER (OUTPATIENT)
Facility: CLINIC | Age: 65
Discharge: HOME | End: 2020-02-14
Attending: SURGERY | Admitting: SURGERY
Payer: COMMERCIAL

## 2020-02-14 VITALS
DIASTOLIC BLOOD PRESSURE: 89 MMHG | HEART RATE: 77 BPM | OXYGEN SATURATION: 94 % | SYSTOLIC BLOOD PRESSURE: 120 MMHG | RESPIRATION RATE: 16 BRPM | BODY MASS INDEX: 29.16 KG/M2 | HEIGHT: 65 IN | WEIGHT: 175 LBS | TEMPERATURE: 97.8 F

## 2020-02-14 LAB — COLONOSCOPY: NORMAL

## 2020-02-14 PROCEDURE — 45380 COLONOSCOPY AND BIOPSY: CPT | Mod: PT | Performed by: SURGERY

## 2020-02-14 PROCEDURE — 25800030 ZZH RX IP 258 OP 636: Performed by: SURGERY

## 2020-02-14 PROCEDURE — 37000009 ZZH ANESTHESIA TECHNICAL FEE, EACH ADDTL 15 MIN: Performed by: SURGERY

## 2020-02-14 PROCEDURE — 88305 TISSUE EXAM BY PATHOLOGIST: CPT | Mod: 26 | Performed by: SURGERY

## 2020-02-14 PROCEDURE — 25000125 ZZHC RX 250: Performed by: NURSE ANESTHETIST, CERTIFIED REGISTERED

## 2020-02-14 PROCEDURE — 37000008 ZZH ANESTHESIA TECHNICAL FEE, 1ST 30 MIN: Performed by: SURGERY

## 2020-02-14 PROCEDURE — 88305 TISSUE EXAM BY PATHOLOGIST: CPT | Performed by: SURGERY

## 2020-02-14 PROCEDURE — 25000128 H RX IP 250 OP 636: Performed by: NURSE ANESTHETIST, CERTIFIED REGISTERED

## 2020-02-14 RX ORDER — PROPOFOL 10 MG/ML
INJECTION, EMULSION INTRAVENOUS PRN
Status: DISCONTINUED | OUTPATIENT
Start: 2020-02-14 | End: 2020-02-14

## 2020-02-14 RX ORDER — SODIUM CHLORIDE, SODIUM LACTATE, POTASSIUM CHLORIDE, CALCIUM CHLORIDE 600; 310; 30; 20 MG/100ML; MG/100ML; MG/100ML; MG/100ML
INJECTION, SOLUTION INTRAVENOUS CONTINUOUS
Status: DISCONTINUED | OUTPATIENT
Start: 2020-02-14 | End: 2020-02-14 | Stop reason: HOSPADM

## 2020-02-14 RX ORDER — LIDOCAINE HYDROCHLORIDE 10 MG/ML
INJECTION, SOLUTION INFILTRATION; PERINEURAL PRN
Status: DISCONTINUED | OUTPATIENT
Start: 2020-02-14 | End: 2020-02-14

## 2020-02-14 RX ORDER — PROPOFOL 10 MG/ML
INJECTION, EMULSION INTRAVENOUS CONTINUOUS PRN
Status: DISCONTINUED | OUTPATIENT
Start: 2020-02-14 | End: 2020-02-14

## 2020-02-14 RX ORDER — LIDOCAINE 40 MG/G
CREAM TOPICAL
Status: DISCONTINUED | OUTPATIENT
Start: 2020-02-14 | End: 2020-02-14 | Stop reason: HOSPADM

## 2020-02-14 RX ORDER — ONDANSETRON 2 MG/ML
4 INJECTION INTRAMUSCULAR; INTRAVENOUS
Status: DISCONTINUED | OUTPATIENT
Start: 2020-02-14 | End: 2020-02-14 | Stop reason: HOSPADM

## 2020-02-14 RX ADMIN — PROPOFOL 150 MCG/KG/MIN: 10 INJECTION, EMULSION INTRAVENOUS at 07:43

## 2020-02-14 RX ADMIN — PROPOFOL 50 MG: 10 INJECTION, EMULSION INTRAVENOUS at 08:02

## 2020-02-14 RX ADMIN — PROPOFOL 100 MG: 10 INJECTION, EMULSION INTRAVENOUS at 07:43

## 2020-02-14 RX ADMIN — SODIUM CHLORIDE, POTASSIUM CHLORIDE, SODIUM LACTATE AND CALCIUM CHLORIDE: 600; 310; 30; 20 INJECTION, SOLUTION INTRAVENOUS at 07:16

## 2020-02-14 RX ADMIN — LIDOCAINE HYDROCHLORIDE 50 MG: 10 INJECTION, SOLUTION INFILTRATION; PERINEURAL at 07:43

## 2020-02-14 ASSESSMENT — COPD QUESTIONNAIRES: CAT_SEVERITY: MILD

## 2020-02-14 ASSESSMENT — MIFFLIN-ST. JEOR: SCORE: 1514.63

## 2020-02-14 NOTE — ANESTHESIA POSTPROCEDURE EVALUATION
Patient: Dustin Shah    Procedure(s):  COLONOSCOPY, WITH POLYPECTOMY AND BIOPSY    Diagnosis:Special screening for malignant neoplasms, colon [Z12.11]  Diagnosis Additional Information: No value filed.    Anesthesia Type:  General, MAC    Note:  Anesthesia Post Evaluation    Patient location during evaluation: Bedside  Patient participation: Able to fully participate in evaluation  Level of consciousness: awake  Pain management: adequate  Airway patency: patent  Cardiovascular status: stable  Respiratory status: nasal cannula  Hydration status: stable  PONV: none     Anesthetic complications: None          Last vitals:  Vitals:    02/14/20 0704   BP: 123/89   Pulse: 81   Resp: 16   Temp: 36.6  C (97.8  F)   SpO2: 96%         Electronically Signed By: JAMISON Skinner CRNA  February 14, 2020  8:10 AM

## 2020-02-14 NOTE — ANESTHESIA CARE TRANSFER NOTE
Patient: Dustin Shah    Procedure(s):  COLONOSCOPY, WITH POLYPECTOMY AND BIOPSY    Diagnosis: Special screening for malignant neoplasms, colon [Z12.11]  Diagnosis Additional Information: No value filed.    Anesthesia Type:   General, MAC     Note:  Airway :Nasal Cannula  Patient transferred to:Phase II  Handoff Report: Identifed the Patient, Identified the Reponsible Provider, Reviewed the pertinent medical history, Discussed the surgical course, Reviewed Intra-OP anesthesia mangement and issues during anesthesia, Set expectations for post-procedure period and Allowed opportunity for questions and acknowledgement of understanding      Vitals: (Last set prior to Anesthesia Care Transfer)    CRNA VITALS  2/14/2020 0739 - 2/14/2020 0810      2/14/2020             Pulse:  76    Ht Rate:  80    SpO2:  93 %                Electronically Signed By: JAMISON Skinner CRNA  February 14, 2020  8:10 AM

## 2020-02-14 NOTE — BRIEF OP NOTE
[facility]   Brief Operative Note    Pre-operative diagnosis: Special screening for malignant neoplasms, colon [Z12.11]   Post-operative diagnosis tiny polyps, otherwise normal     Procedure: Procedure(s):  COLONOSCOPY   Surgeon(s): Surgeon(s) and Role:     * Emerson Meyer MD - Primary   Estimated blood loss: * No values recorded between 2/14/2020 12:00 AM and 2/14/2020  8:08 AM *    Specimens: ID Type Source Tests Collected by Time Destination   A : at 30 cm Polyp Colon SURGICAL PATHOLOGY EXAM Emersno Meyer MD 2/14/2020  8:05 AM       Findings: 1. Two tiny polyps at 30 cm  2. Colon otherwise normal

## 2020-02-14 NOTE — H&P
64 year old year old male here for colonoscopy for personal history of polyps.    Patient Active Problem List   Diagnosis     HL (hearing loss)     COPD (chronic obstructive pulmonary disease) (H)     CARDIOVASCULAR SCREENING; LDL GOAL LESS THAN 130     Benign essential hypertension     Former smoker     Undiagnosed cardiac murmurs     Hyperlipidemia with target LDL less than 100     Status post coronary angiogram     Stage 1 mild COPD by GOLD classification (H)     Advanced directives, counseling/discussion     Tubular adenoma of colon     Bicuspid aortic valve     Overweight (BMI 25.0-29.9)       Past Medical History:   Diagnosis Date     Bicuspid aortic valve      COPD (chronic obstructive pulmonary disease) (H) 10/2015     Hyperlipidemia 10/2015     Hypertension 10/2015       Past Surgical History:   Procedure Laterality Date     COLONOSCOPY       ENT SURGERY      Tonsels     HERNIA REPAIR         @[facility]@    No current outpatient medications on file.       Allergies   Allergen Reactions     Nka [No Known Allergies]        Pt reports that he quit smoking about 6 years ago. His smoking use included cigarettes. He has a 40.00 pack-year smoking history. He has never used smokeless tobacco. He reports current alcohol use. He reports that he does not use drugs.    Exam:  There were no vitals taken for this visit.    Awake, Alert OX3  Lungs - CTA bilaterally  CV - RRR, no murmurs, distal pulses intact  Abd - soft, non-distended, non-tender, +BS  Extr - No cyanosis or edema    A/P 64 year old year old male in need of colonoscopy for personal history of polyps. Risks, benefits, alternatives, and complications were discussed including the possibility of perforation and the patient agreed to proceed.    Emerson Meyer MD

## 2020-02-19 LAB — COPATH REPORT: NORMAL

## 2020-03-03 ENCOUNTER — HOSPITAL ENCOUNTER (OUTPATIENT)
Dept: PHYSICAL THERAPY | Facility: CLINIC | Age: 65
Setting detail: THERAPIES SERIES
End: 2020-03-03
Attending: FAMILY MEDICINE
Payer: COMMERCIAL

## 2020-03-03 PROCEDURE — 97140 MANUAL THERAPY 1/> REGIONS: CPT | Mod: GP | Performed by: PHYSICAL THERAPIST

## 2020-03-03 PROCEDURE — 97110 THERAPEUTIC EXERCISES: CPT | Mod: GP | Performed by: PHYSICAL THERAPIST

## 2020-04-09 NOTE — PROGRESS NOTES
Outpatient Physical Therapy Discharge Note     Patient: Dustin Shah  : 1955    Beginning/End Dates of Reporting Period:  20 to 2020    Referring Provider: Kirill Perkins Diagnosis: piriformis syndrome R     Client Self Report: standing no longer limits him due to pain. He can stand as long as he wants. He now however notices some pain in his low back area after exercises and after a long day at work.     Objective Measurements:  Objective Measure: palpation   Details: tender to palpation and increased tone over right lumbar paraspinals and QL     Goals:  Goal Identifier 1   Goal Description Patient will be independent with HEP in order to continue strengthening outside of physical therapy.    Target Date 20   Date Met  20   Progress:     Goal Identifier 2   Goal Description Patient will be able to drive to/from work wihtout increased right leg pain.    Target Date 20   Date Met  20   Progress:     Goal Identifier 3   Goal Description Patient will be able to stand for 1 hour wihtout increased right leg pain in order to complete work duties.    Target Date 20   Date Met  20   Progress:     Progress Toward Goals:   At last attended PT visit: Pateint is having no further issues with standing and leg pain. Primary complaint today was low back pain most likely related to poor form on standing hip extension kicks. Modified exercise and added in low back stretching. At this point, patient has met all of his PT goals. Will have him f/u in 2 weeks if needed.   Patient has not returned to PT now for over 30 days, this episode of care is being resolved.       Plan:  Discharge from therapy.    Discharge:    Reason for Discharge: Patient has met all goals.    Equipment Issued: theraband    Discharge Plan: Patient to continue home program.      Juana Jiménez  PT, DPT       2020   [facility]  [address] [place] [address]   [address]  [place]  [address]  [email or web address]  [email or web address]  Voicemail: [phone]

## 2020-10-22 DIAGNOSIS — I10 BENIGN ESSENTIAL HYPERTENSION: ICD-10-CM

## 2020-10-22 DIAGNOSIS — E78.2 MIXED HYPERLIPIDEMIA: ICD-10-CM

## 2020-10-22 LAB
ANION GAP SERPL CALCULATED.3IONS-SCNC: 4 MMOL/L (ref 3–14)
BUN SERPL-MCNC: 15 MG/DL (ref 7–30)
CALCIUM SERPL-MCNC: 8.6 MG/DL (ref 8.5–10.1)
CHLORIDE SERPL-SCNC: 104 MMOL/L (ref 94–109)
CHOLEST SERPL-MCNC: 216 MG/DL
CO2 SERPL-SCNC: 27 MMOL/L (ref 20–32)
CREAT SERPL-MCNC: 1.02 MG/DL (ref 0.66–1.25)
GFR SERPL CREATININE-BSD FRML MDRD: 77 ML/MIN/{1.73_M2}
GLUCOSE SERPL-MCNC: 101 MG/DL (ref 70–99)
HDLC SERPL-MCNC: 65 MG/DL
LDLC SERPL CALC-MCNC: 127 MG/DL
NONHDLC SERPL-MCNC: 151 MG/DL
POTASSIUM SERPL-SCNC: 4.4 MMOL/L (ref 3.4–5.3)
SODIUM SERPL-SCNC: 135 MMOL/L (ref 133–144)
TRIGL SERPL-MCNC: 120 MG/DL

## 2020-10-22 PROCEDURE — 80061 LIPID PANEL: CPT | Performed by: FAMILY MEDICINE

## 2020-10-22 PROCEDURE — 80048 BASIC METABOLIC PNL TOTAL CA: CPT | Performed by: FAMILY MEDICINE

## 2020-10-22 NOTE — RESULT ENCOUNTER NOTE
The 10-year ASCVD risk score (Meka HIGGINS Jr., et al., 2013) is: 12.1%    Values used to calculate the score:      Age: 65 years      Sex: Male      Is Non- : No      Diabetic: No      Tobacco smoker: No      Systolic Blood Pressure: 120 mmHg      Is BP treated: Yes      HDL Cholesterol: 65 mg/dL      Total Cholesterol: 216 mg/dL

## 2020-10-23 ENCOUNTER — ALLIED HEALTH/NURSE VISIT (OUTPATIENT)
Dept: FAMILY MEDICINE | Facility: CLINIC | Age: 65
End: 2020-10-23
Payer: COMMERCIAL

## 2020-10-23 DIAGNOSIS — Z23 NEED FOR PROPHYLACTIC VACCINATION AND INOCULATION AGAINST INFLUENZA: Primary | ICD-10-CM

## 2020-10-23 PROCEDURE — 90662 IIV NO PRSV INCREASED AG IM: CPT

## 2020-10-23 PROCEDURE — 90471 IMMUNIZATION ADMIN: CPT

## 2020-10-23 PROCEDURE — 99207 PR NO CHARGE NURSE ONLY: CPT

## 2021-01-21 DIAGNOSIS — J44.9 STAGE 1 MILD COPD BY GOLD CLASSIFICATION (H): ICD-10-CM

## 2021-01-21 NOTE — TELEPHONE ENCOUNTER
"Requested Prescriptions   Pending Prescriptions Disp Refills     ADVAIR DISKUS 250-50 MCG/DOSE inhaler [Pharmacy Med Name: Advair Diskus 250 mcg-50 mcg/dose powder for inhalation]  11     Sig: Inhale 1 puff into the lungs 2 times daily       Inhaled Steroids Protocol Failed - 1/21/2021  8:16 AM        Failed - Recent (12 mo) or future (30 days) visit within the authorizing provider's specialty     Patient has had an office visit with the authorizing provider or a provider within the authorizing providers department within the previous 12 mos or has a future within next 30 days. See \"Patient Info\" tab in inbasket, or \"Choose Columns\" in Meds & Orders section of the refill encounter.              Passed - Patient is age 12 or older        Passed - Medication is active on med list       Long-Acting Beta Agonist Inhalers Protocol  Failed - 1/21/2021  8:16 AM        Failed - Recent (12 mo) or future (30 days) visit within the authorizing provider's specialty     Patient has had an office visit with the authorizing provider or a provider within the authorizing providers department within the previous 12 mos or has a future within next 30 days. See \"Patient Info\" tab in inbasket, or \"Choose Columns\" in Meds & Orders section of the refill encounter.              Failed - Order for Serevent, Striverdi, or Foradil and pt has steroid inhaler        Passed - Patient is age 12 or older        Passed - Medication is active on med list             "

## 2021-01-25 ENCOUNTER — MYC MEDICAL ADVICE (OUTPATIENT)
Dept: FAMILY MEDICINE | Facility: CLINIC | Age: 66
End: 2021-01-25

## 2021-01-26 NOTE — TELEPHONE ENCOUNTER
Left message on personal answering machine that script was sent and he will need to make an appointment before next refill. Direct line provided.  Corinna Domingo RN

## 2021-02-12 DIAGNOSIS — I10 BENIGN ESSENTIAL HYPERTENSION: ICD-10-CM

## 2021-02-12 RX ORDER — LISINOPRIL 20 MG/1
20 TABLET ORAL DAILY
Qty: 30 TABLET | Refills: 0 | Status: SHIPPED | OUTPATIENT
Start: 2021-02-12 | End: 2021-02-16

## 2021-02-12 ASSESSMENT — ENCOUNTER SYMPTOMS
FREQUENCY: 0
CONSTIPATION: 0
HEADACHES: 0
ARTHRALGIAS: 0
HEARTBURN: 1
NAUSEA: 0
JOINT SWELLING: 0
HEMATOCHEZIA: 0
DIZZINESS: 0
MYALGIAS: 0
DIARRHEA: 0
COUGH: 1
ABDOMINAL PAIN: 0
CHILLS: 0
EYE PAIN: 0
DYSURIA: 0
SORE THROAT: 0
FEVER: 0
SHORTNESS OF BREATH: 0
WEAKNESS: 0
NERVOUS/ANXIOUS: 0
HEMATURIA: 0
PALPITATIONS: 0
PARESTHESIAS: 0

## 2021-02-12 ASSESSMENT — ACTIVITIES OF DAILY LIVING (ADL): CURRENT_FUNCTION: NO ASSISTANCE NEEDED

## 2021-02-12 NOTE — TELEPHONE ENCOUNTER
Next 5 appointments (look out 90 days)    Feb 16, 2021  8:00 AM  PHYSICAL with Kirill Hauser MD  [facility] ([facility] [place] )  Arrive at: Clinic [address] [address]  [phone]         Medication is being filled for 1 time refill only due to:  has appt next week     Celine REBOLLAR RN, BSN

## 2021-02-12 NOTE — TELEPHONE ENCOUNTER
"Requested Prescriptions   Pending Prescriptions Disp Refills     lisinopril (ZESTRIL) 20 MG tablet [Pharmacy Med Name: lisinopril 20 mg tablet] 90 tablet 3     Sig: Take 1 tablet (20 mg) by mouth daily       ACE Inhibitors (Including Combos) Protocol Passed - 2/12/2021  8:00 AM        Passed - Blood pressure under 140/90 in past 12 months     BP Readings from Last 3 Encounters:   02/14/20 120/89   01/21/20 108/84   12/27/18 120/84                 Passed - Recent (12 mo) or future (30 days) visit within the authorizing provider's specialty     Patient has had an office visit with the authorizing provider or a provider within the authorizing providers department within the previous 12 mos or has a future within next 30 days. See \"Patient Info\" tab in inbasket, or \"Choose Columns\" in Meds & Orders section of the refill encounter.              Passed - Medication is active on med list        Passed - Patient is age 18 or older        Passed - Normal serum creatinine on file in past 12 months     Recent Labs   Lab Test 10/22/20  0753   CR 1.02       Ok to refill medication if creatinine is low          Passed - Normal serum potassium on file in past 12 months     Recent Labs   Lab Test 10/22/20  0753   POTASSIUM 4.4                  "

## 2021-02-16 ENCOUNTER — OFFICE VISIT (OUTPATIENT)
Dept: FAMILY MEDICINE | Facility: CLINIC | Age: 66
End: 2021-02-16
Payer: COMMERCIAL

## 2021-02-16 VITALS
TEMPERATURE: 97.2 F | WEIGHT: 179.6 LBS | RESPIRATION RATE: 14 BRPM | BODY MASS INDEX: 29.92 KG/M2 | SYSTOLIC BLOOD PRESSURE: 130 MMHG | OXYGEN SATURATION: 95 % | HEART RATE: 74 BPM | HEIGHT: 65 IN | DIASTOLIC BLOOD PRESSURE: 76 MMHG

## 2021-02-16 DIAGNOSIS — Z12.2 ENCOUNTER FOR SCREENING FOR LUNG CANCER: ICD-10-CM

## 2021-02-16 DIAGNOSIS — E78.2 MIXED HYPERLIPIDEMIA: ICD-10-CM

## 2021-02-16 DIAGNOSIS — H91.93 DECREASED HEARING OF BOTH EARS: ICD-10-CM

## 2021-02-16 DIAGNOSIS — I10 BENIGN ESSENTIAL HYPERTENSION: ICD-10-CM

## 2021-02-16 DIAGNOSIS — Z23 NEED FOR VACCINATION: ICD-10-CM

## 2021-02-16 DIAGNOSIS — E66.3 OVERWEIGHT (BMI 25.0-29.9): ICD-10-CM

## 2021-02-16 DIAGNOSIS — Z00.00 ENCOUNTER FOR MEDICARE ANNUAL WELLNESS EXAM: Primary | ICD-10-CM

## 2021-02-16 DIAGNOSIS — J44.9 STAGE 1 MILD COPD BY GOLD CLASSIFICATION (H): ICD-10-CM

## 2021-02-16 DIAGNOSIS — G56.03 BILATERAL CARPAL TUNNEL SYNDROME: ICD-10-CM

## 2021-02-16 DIAGNOSIS — I77.810 ASCENDING AORTA DILATATION (H): ICD-10-CM

## 2021-02-16 PROCEDURE — 99397 PER PM REEVAL EST PAT 65+ YR: CPT | Mod: 25 | Performed by: FAMILY MEDICINE

## 2021-02-16 PROCEDURE — 90471 IMMUNIZATION ADMIN: CPT | Performed by: FAMILY MEDICINE

## 2021-02-16 PROCEDURE — 90732 PPSV23 VACC 2 YRS+ SUBQ/IM: CPT | Performed by: FAMILY MEDICINE

## 2021-02-16 PROCEDURE — 99214 OFFICE O/P EST MOD 30 MIN: CPT | Mod: 25 | Performed by: FAMILY MEDICINE

## 2021-02-16 RX ORDER — LISINOPRIL 20 MG/1
20 TABLET ORAL DAILY
Qty: 90 TABLET | Refills: 3 | Status: SHIPPED | OUTPATIENT
Start: 2021-02-16 | End: 2022-02-23

## 2021-02-16 RX ORDER — ROSUVASTATIN CALCIUM 10 MG/1
10 TABLET, COATED ORAL DAILY
Qty: 90 TABLET | Refills: 3 | Status: SHIPPED | OUTPATIENT
Start: 2021-02-16 | End: 2022-01-18

## 2021-02-16 ASSESSMENT — ENCOUNTER SYMPTOMS
DIARRHEA: 0
FEVER: 0
JOINT SWELLING: 0
HEARTBURN: 1
NAUSEA: 0
HEADACHES: 0
PARESTHESIAS: 0
MYALGIAS: 0
WEAKNESS: 0
ABDOMINAL PAIN: 0
DIZZINESS: 0
HEMATURIA: 0
CHILLS: 0
FREQUENCY: 0
HEMATOCHEZIA: 0
NERVOUS/ANXIOUS: 0
EYE PAIN: 0
DYSURIA: 0
SHORTNESS OF BREATH: 0
COUGH: 1
ARTHRALGIAS: 0
PALPITATIONS: 0
CONSTIPATION: 0
SORE THROAT: 0

## 2021-02-16 ASSESSMENT — ACTIVITIES OF DAILY LIVING (ADL): CURRENT_FUNCTION: NO ASSISTANCE NEEDED

## 2021-02-16 ASSESSMENT — MIFFLIN-ST. JEOR: SCORE: 1530.5

## 2021-02-16 NOTE — PATIENT INSTRUCTIONS
Schedule echocardiogram and cardiology consult. Contact Cardiac Services  at [phone], to schedule this appointment.    Schedule EMG with neurology.    Schedule audiology testing.    Schedule low dose CT scan for lung cancer screening as you already have received the notice in the mail.    Start rosuvastatin 10 mg daily.  Be consistent with low salt, low trans fat and low saturated fat diet.  Eat food rich in omega-3-fatty acids as you tolerate. (salmon, olive oil)  Eat 5 cups of vegetables, fruits and whole grains per day.  Limit starchy food (white rice, white bread, white pasta, white potatoes) to less than a cup per meal.  Minimize sweets, junk food and fastfood. Limit soda beverages to one serving per day; best to avoid it altogether though.    Exercise: moderate intensity sustained for at least 30 mins per episode, goal of 150 mins per week at least  Combine cardiovascular and resistance exercises.  These exercise recommendations are in addition to your daily activity at work or home.  Work on losing weight.    Blood tests: Call to schedule repeat cholesterol lab tests in 3 months (May 2021); make sure you are fasting overnight.   Preventative Care Visits include: Yearly physicals, Well-child visits, Welcome to Medicare visits, & Medicare yearly wellness exams.    The purpose of these visits is to discuss your medical history and prevent health problems before you are sick.  You may need to pay a copay, coinsurance or deductible if your visit today includes testing or treating for a new or existing condition.    Additional charges may be incurred for today's visit. If you have questions about what your insurance plan covers, please contact your Insurance Company's member service department.  If you have questions specific to a bill you have already received from [employer], please contact the [employer] Billing Office at [phone].   Patient Education   Personalized Prevention Plan  You are due  for the preventive services outlined below.  Your care team is available to assist you in scheduling these services.  If you have already completed any of these items, please share that information with your care team to update in your medical record.  Health Maintenance Due   Topic Date Due     COPD Action Plan  1955     FALL RISK ASSESSMENT  04/23/2020     Pneumococcal Vaccine (1 of 1 - PPSV23) 04/23/2020     Discuss Advance Care Planning  01/06/2021     Lung Cancer Screening (CT Scan)  01/24/2021     Preventive Health Recommendations  See your health care provider every year to    Review health changes.     Discuss preventive care.      Review your medicines if your doctor has prescribed any.    Talk with your health care provider about whether you should have a test to screen for prostate cancer (PSA).    Every 3 years, have a diabetes test (fasting glucose). If you are at risk for diabetes, you should have this test more often.    Every 5 years, have a cholesterol test. Have this test more often if you are at risk for high cholesterol or heart disease.     Every 10 years, have a colonoscopy. Or, have a yearly FIT test (stool test). These exams will check for colon cancer.    Talk to with your health care provider about screening for Abdominal Aortic Aneurysm if you have a family history of AAA or have a history of smoking.    Shots:     Get a flu shot each year.     Get a tetanus shot every 10 years.     Talk to your doctor about your pneumonia vaccines. There are now two you should receive - Pneumovax (PPSV 23) and Prevnar (PCV 13).    Talk to your pharmacist about a shingles vaccine.     Talk to your doctor about the hepatitis B vaccine.    Nutrition:     Eat at least 5 servings of fruits and vegetables each day.     Eat whole-grain bread, whole-wheat pasta and brown rice instead of white grains and rice.     Get adequate Calcium and Vitamin D.     Lifestyle    Exercise for at least 150 minutes a week  (30 minutes a day, 5 days a week). This will help you control your weight and prevent disease.     Limit alcohol to one drink per day.     No smoking.     Wear sunscreen to prevent skin cancer.     See your dentist every six months for an exam and cleaning.     See your eye doctor every 1 to 2 years to screen for conditions such as glaucoma, macular degeneration and cataracts.    Personalized Prevention Plan  You are due for the preventive services outlined below.  Your care team is available to assist you in scheduling these services.  If you have already completed any of these items, please share that information with your care team to update in your medical record.  Health Maintenance   Topic Date Due     COPD ACTION PLAN  1955     FALL RISK ASSESSMENT  04/23/2020     Pneumococcal Vaccine: 65+ Years (1 of 1 - PPSV23) 04/23/2020     ADVANCE CARE PLANNING  01/06/2021     LUNG CANCER SCREENING ANNUAL  01/24/2021     MEDICARE ANNUAL WELLNESS VISIT  02/16/2022     COLORECTAL CANCER SCREENING  02/14/2023     LIPID  10/22/2025     DTAP/TDAP/TD IMMUNIZATION (3 - Td) 12/27/2028     SPIROMETRY  Completed     HEPATITIS C SCREENING  Completed     HIV SCREENING  Completed     PHQ-2  Completed     INFLUENZA VACCINE  Completed     ZOSTER IMMUNIZATION  Completed     AORTIC ANEURYSM SCREENING (SYSTEM ASSIGNED)  Completed     Pneumococcal Vaccine: Pediatrics (0 to 5 Years) and At-Risk Patients (6 to 64 Years)  Aged Out     IPV IMMUNIZATION  Aged Out     MENINGITIS IMMUNIZATION  Aged Out     HEPATITIS B IMMUNIZATION  Aged Out       Exercise for a Healthier Heart     Exercise with a friend. When activity is fun, you're more likely to stick with it.   You may wonder how you can improve the health of your heart. If you re thinking about exercise, you re on the right track. You don t need to become an athlete. But you do need a certain amount of brisk exercise to help strengthen your heart. If you have been diagnosed with a heart  condition, your healthcare provider may advise exercise to help stabilize your condition. To help make exercise a habit, choose safe, fun activities.   Before you start  Check with your healthcare provider before starting an exercise program. This is especially important if you have not been active for a while. It's also important if you have a long-term (chronic) health problem such as heart disease, diabetes, or obesity. Or if you are at high risk for having these problems.   Why exercise?  Exercising regularly offers many healthy rewards. It can help you do all of the following:    Improve your blood cholesterol level to help prevent further heart trouble    Lower your blood pressure to help prevent a stroke or heart attack    Control diabetes, or reduce your risk of getting this disease    Improve your heart and lung function    Reach and stay at a healthy weight    Make your muscles stronger so you can stay active    Prevent falls and fractures by slowing the loss of bone mass (osteoporosis)    Manage stress better    Reduce your blood pressure    Improve your sense of self and your body image  Exercise tips      Ease into your routine. Set small goals. Then build on them. If you are not sure what your activity level should be, talk with your healthcare provider first before starting an exercise routine.    Exercise on most days. Aim for a total of 150 minutes (2 hours and 30 minutes) or more of moderate-intensity aerobic activity each week. Or 75 minutes (1 hour and 15 minutes) or more of vigorous-intensity aerobic activity each week. Or try for a combination of both. Moderate activity means that you breathe heavier and your heart rate increases but you can still talk. Think about doing 40 minutes of moderate exercise, 3 to 4 times a week. For best results, activity should last for about 40 minutes to lower blood pressure and cholesterol. It's OK to work up to the 40-minute period over time. Examples of  moderate-intensity activity are walking 1 mile in 15 minutes. Or doing 30 to 45 minutes of yard work.    Step up your daily activity level.  Along with your exercise program, try being more active the whole day. Walk instead of drive. Or park further away so that you take more steps each day. Do more household tasks or yard work. You may not be able to meet the advised mount of physical activity. But doing some moderate- or vigorous-intensity aerobic activity can help reduce your risk for heart disease. Your healthcare provider can help you figure out what is best for you.    Choose 1 or more activities you enjoy.  Walking is one of the easiest things you can do. You can also try swimming, riding a bike, dancing, or taking an exercise class.    When to call your healthcare provider  Call your healthcare provider if you have any of these:     Chest pain or feel dizzy or lightheaded    Burning, tightness, pressure, or heaviness in your chest, neck, shoulders, back, or arms    Abnormal shortness of breath    More joint or muscle pain    A very fast or irregular heartbeat (palpitations)  [employer] last reviewed this educational content on 7/1/2019 2000-2020 The [employer]. [address] [place] [address]. All rights reserved. This information is not intended as a substitute for professional medical care. Always follow your healthcare professional's instructions.          Understanding USDA MyPlate  The USDA has guidelines to help you make healthy food choices. These are called MyPlate. MyPlate shows the food groups that make up healthy meals using the image of a place setting. Before you eat, think about the healthiest choices for what to put on your plate or in your cup or bowl. To learn more about building a healthy plate, visit www.choosemyplate.gov.     The food groups    Fruits. Any fruit or 100% fruit juice counts as part of the Fruit Group. Fruits may be fresh, canned, frozen, or dried, and  may be whole, cut-up, or pureed. Make 1/2 of your plate fruits and vegetables.    Vegetables. Any vegetable or 100% vegetable juice counts as a member of the Vegetable Group. Vegetables may be fresh, frozen, canned, or dried. They can be served raw or cooked and may be whole, cut-up, or mashed. Make 1/2 of your plate fruits and vegetables.    Grains. All foods made from grains are part of the Grains Group. These include wheat, rice, oats, cornmeal, and barley. Grains are often used to make foods such as bread, pasta, oatmeal, cereal, tortillas, and grits. Grains should be no more than 1/4 of your plate. At least half of your grains should be whole grains.    Protein. This group includes meat, poultry, seafood, beans and peas, eggs, processed soy products (such as tofu), nuts (including nut butters), and seeds. Make protein choices no more than 1/4 of your plate. Meat and poultry choices should be lean or low fat.    Dairy. The Dairy Group includes all fluid milk products and foods made from milk that contain calcium, such as yogurt and cheese. (Foods that have little calcium, such as cream, butter, and cream cheese, are not part of this group.) Most dairy choices should be low-fat or fat-free.    Oils. Oils aren't a food group, but they do contain essential nutrients. However it's important to watch your intake of oils. These are fats that are liquid at room temperature. They include canola, corn, olive, soybean, vegetable, and sunflower oil. Foods that are mainly oil include mayonnaise, certain salad dressings, and soft margarines. You likely already get your daily oil allowance from the foods you eat.  Things to limit  Eating healthy also means limiting these things in your diet:    Salt (sodium). Many processed foods have a lot of sodium. To keep sodium intake down, eat fresh vegetables, meats, poultry, and seafood when possible. Purchase low-sodium, reduced-sodium, or no-salt-added food products at the store.  And don't add salt to your meals at home. Instead, season them with herbs and spices such as dill, oregano, cumin, and paprika. Or try adding flavor with lemon or lime zest and juice.    Saturated fat. Saturated fats are most often found in animal products such as beef, pork, and chicken. They are often solid at room temperature, such as butter. To reduce your saturated fat intake, choose leaner cuts of meat and poultry. And try healthier cooking methods such as grilling, broiling, roasting, or baking. For a simple lower-fat swap, use plain nonfat yogurt instead of mayonnaise when making potato salad or macaroni salad.    Added sugars. These are sugars added to foods. They are in foods such as ice cream, candy, soda, fruit drinks, sports drinks, energy drinks, cookies, pastries, jams, and syrups. Cut down on added sugars by sharing sweet treats with a family member or friend. You can also choose fruit for dessert, and drink water or other unsweetened beverages.  [employer] last reviewed this educational content on 6/1/2020 2000-2020 [employer]. [address] [place] [address]. All rights reserved. This information is not intended as a substitute for professional medical care. Always follow your healthcare professional's instructions.          Signs of Hearing Loss      Hearing much better with one ear can be a sign of hearing loss.   Hearing loss is a problem shared by many people. In fact, it is one of the most common health problems, particularly as people age. Most people age 65 and older have some hearing loss. By age 80, almost everyone does. Hearing loss often occurs slowly over the years. So you may not realize your hearing has gotten worse.  Have your hearing checked  Call your healthcare provider if you:    Have to strain to hear normal conversation    Have to watch other people s faces very carefully to follow what they re saying    Need to ask people to repeat what they ve  said    Often misunderstand what people are saying    Turn the volume of the television or radio up so high that others complain    Feel that people are mumbling when they re talking to you    Find that the effort to hear leaves you feeling tired and irritated    Notice, when using the phone, that you hear better with one ear than the other  [employer] last reviewed this educational content on 1/1/2020 2000-2020 [employer]. [address] [place] [address], [place] [address]. All rights reserved. This information is not intended as a substitute for professional medical care. Always follow your healthcare professional's instructions.

## 2021-02-16 NOTE — NURSING NOTE
Chief Complaint   Patient presents with     Wellness Visit     Pt is fasting for lab work.  Labs done in October.     Prior to immunization administration, verified patients identity using patient s name and date of birth. Please see Immunization Activity for additional information.     Screening Questionnaire for Adult Immunization    Are you sick today?   No   Do you have allergies to medications, food, a vaccine component or latex?   No   Have you ever had a serious reaction after receiving a vaccination?   No   Do you have a long-term health problem with heart, lung, kidney, or metabolic disease (e.g., diabetes), asthma, a blood disorder, no spleen, complement component deficiency, a cochlear implant, or a spinal fluid leak?  Are you on long-term aspirin therapy?   No   Do you have cancer, leukemia, HIV/AIDS, or any other immune system problem?   No   Do you have a parent, brother, or sister with an immune system problem?   No   In the past 3 months, have you taken medications that affect  your immune system, such as prednisone, other steroids, or anticancer drugs; drugs for the treatment of rheumatoid arthritis, Crohn s disease, or psoriasis; or have you had radiation treatments?   No   Have you had a seizure, or a brain or other nervous system problem?   No   During the past year, have you received a transfusion of blood or blood    products, or been given immune (gamma) globulin or antiviral drug?   No   For women: Are you pregnant or is there a chance you could become       pregnant during the next month?   No   Have you received any vaccinations in the past 4 weeks?   No     Immunization questionnaire answers were all negative.        Per orders of Dr. Hauser, injection of PNEUMOVAX 23 given by Nhung Champion CMA. Patient instructed to remain in clinic for 15 minutes afterwards, and to report any adverse reaction to me immediately.       Screening performed by Nhung Champion CMA on 2/16/2021 at 9:08 AM.

## 2021-02-16 NOTE — PROGRESS NOTES
"SUBJECTIVE:   Dustin Shah is a 65 year old male who presents for Preventive Visit.    Patient has been advised of split billing requirements and indicates understanding: Yes   Are you in the first 12 months of your Medicare coverage?  Pt is getting eye exam done at [facility].    Healthy Habits:     In general, how would you rate your overall health?  Good    Frequency of exercise:  None    Do you usually eat at least 4 servings of fruit and vegetables a day, include whole grains    & fiber and avoid regularly eating high fat or \"junk\" foods?  No    Taking medications regularly:  Yes    Medication side effects:  Not applicable    Ability to successfully perform activities of daily living:  No assistance needed    Home Safety:  No safety concerns identified    Hearing Impairment:  Difficulty following a conversation in a noisy restaurant or crowded room, feel that people are mumbling or not speaking clearly, difficult to understand a speaker at a public meeting or [employer] service, need to ask people to speak up or repeat themselves, difficulty understanding soft or whispered speech and difficulty understanding speech on the telephone    In the past 6 months, have you been bothered by leaking of urine?  No    In general, how would you rate your overall mental or emotional health?  Excellent      PHQ-2 Total Score: 0    Additional concerns today:  Yes    Would like referral for audiology.    COPD - coughs occasionally, clear phlegm sometimes, infrequent use of rescue inhaler.    Needs referral back to cardiology - has mild enlargement of ascending aorta. Has not been in to [facility] cardiology for many years. Patient would like to reestablish care in [facility].      The 10-year ASCVD risk score (Woodleafcarlos HIGGINS Jr., et al., 2013) is: 13.8%    Values used to calculate the score:      Age: 65 years      Sex: Male      Is Non- : No      Diabetic: No      Tobacco smoker: No      Systolic Blood Pressure: " 130 mmHg      Is BP treated: Yes      HDL Cholesterol: 65 mg/dL      Total Cholesterol: 216 mg/dL  Do you feel safe in your environment? Yes    Have you ever done Advance Care Planning? (For example, a Health Directive, POLST, or a discussion with a medical provider or your loved ones about your wishes): No, advance care planning information given to patient to review.  Patient plans to discuss their wishes with loved ones or provider.      Fall risk  Fallen 2 or more times in the past year?: No  Any fall with injury in the past year?: No    Cognitive Screening   1) Repeat 3 items (Leader, Season, Table)    2) Clock draw: NORMAL  3) 3 item recall: Recalls 3 objects  Results: 3 items recalled: COGNITIVE IMPAIRMENT LESS LIKELY    Mini-CogTM Copyright S Lizett. Licensed by the author for use in [facility] [employer]; reprinted with permission (juan@[facility]). All rights reserved.      Do you have sleep apnea, excessive snoring or daytime drowsiness?: no    Reviewed and updated as needed this visit by clinical staff  Tobacco  Allergies  Meds  Problems  Med Hx  Surg Hx  Fam Hx  Soc Hx          Reviewed and updated as needed this visit by Provider  Tobacco  Allergies  Meds  Problems  Med Hx  Surg Hx  Fam Hx         Social History     Tobacco Use     Smoking status: Former Smoker     Packs/day: 1.00     Years: 40.00     Pack years: 40.00     Types: Cigarettes     Quit date: 2013     Years since quittin.1     Smokeless tobacco: Never Used   Substance Use Topics     Alcohol use: Yes     Comment: social     If you drink alcohol do you typically have >3 drinks per day or >7 drinks per week? No    Alcohol Use 2021   Prescreen: >3 drinks/day or >7 drinks/week? -   Prescreen: >3 drinks/day or >7 drinks/week? No       Joint Pain    Onset: several years ago    Description:   Location: left wrist and right wrist, left one is worse  Character: Dull ache    Intensity: mild    Progression of Symptoms:  worse    Accompanying Signs & Symptoms:  Other symptoms: numbness, tingling and weakness of wrists, loss of ; more tingling of the thumbs, index fingers and middle fingers - letting it hang on the side improves; positive flick test.    History:   Previous similar pain: YES      Precipitating factors:   Trauma or overuse: YES-  for 40 years    Alleviating factors:  Improved by: support wrap has helped in the past    Therapies Tried and outcome: wrist brace has helped in the past, has not wore for about 1 month due to increase in pain when wearing it    No recent eval or treatment.      Current providers sharing in care for this patient include:   Patient Care Team:  Kirill Hauser MD as PCP - General (Family Practice)  Kirill Hauser MD as Assigned PCP    The following health maintenance items are reviewed in Epic and correct as of today:  Health Maintenance   Topic Date Due     COPD ACTION PLAN  1955     FALL RISK ASSESSMENT  04/23/2020     LUNG CANCER SCREENING ANNUAL  01/24/2021     MEDICARE ANNUAL WELLNESS VISIT  02/16/2022     COLORECTAL CANCER SCREENING  02/14/2023     ADVANCE CARE PLANNING  02/16/2026     DTAP/TDAP/TD IMMUNIZATION (3 - Td) 12/27/2028     SPIROMETRY  Completed     HEPATITIS C SCREENING  Completed     HIV SCREENING  Completed     PHQ-2  Completed     INFLUENZA VACCINE  Completed     Pneumococcal Vaccine: 65+ Years  Completed     ZOSTER IMMUNIZATION  Completed     AORTIC ANEURYSM SCREENING (SYSTEM ASSIGNED)  Completed     Pneumococcal Vaccine: Pediatrics (0 to 5 Years) and At-Risk Patients (6 to 64 Years)  Aged Out     IPV IMMUNIZATION  Aged Out     MENINGITIS IMMUNIZATION  Aged Out     HEPATITIS B IMMUNIZATION  Aged Out     BP Readings from Last 3 Encounters:   02/16/21 130/76   02/14/20 120/89   01/21/20 108/84    Wt Readings from Last 3 Encounters:   02/16/21 81.5 kg (179 lb 9.6 oz)   02/14/20 79.4 kg (175 lb)   01/21/20 79.7 kg (175 lb 12.8 oz)                   Patient Active Problem List   Diagnosis     HL (hearing loss)     COPD (chronic obstructive pulmonary disease) (H)     CARDIOVASCULAR SCREENING; LDL GOAL LESS THAN 130     Benign essential hypertension     Former smoker     Undiagnosed cardiac murmurs     Hyperlipidemia with target LDL less than 100     Status post coronary angiogram     Stage 1 mild COPD by GOLD classification (H)     Advanced directives, counseling/discussion     Tubular adenoma of colon     Bicuspid aortic valve     Overweight (BMI 25.0-29.9)     Past Surgical History:   Procedure Laterality Date     ABDOMEN SURGERY       COLONOSCOPY       COLONOSCOPY N/A 2020    Procedure: COLONOSCOPY, WITH POLYPECTOMY AND BIOPSY;  Surgeon: Emerson Meyer MD;  Location: [facility]     ENT SURGERY      Tonsels     HERNIA REPAIR       ORTHOPEDIC SURGERY  2017    broken ankle       Social History     Tobacco Use     Smoking status: Former Smoker     Packs/day: 1.00     Years: 40.00     Pack years: 40.00     Types: Cigarettes     Quit date: 2013     Years since quittin.1     Smokeless tobacco: Never Used   Substance Use Topics     Alcohol use: Yes     Comment: social     Family History   Problem Relation Age of Onset     Diabetes Mother      Peripheral Vascular Disease Mother         mother w/ PAD     Heart Failure Mother      Heart Failure Maternal Half-Brother          of heart attack age 57     Coronary Artery Disease No family hx of          Current Outpatient Medications   Medication Sig Dispense Refill     albuterol (PROAIR HFA/PROVENTIL HFA/VENTOLIN HFA) 108 (90 Base) MCG/ACT inhaler Inhale 2 puffs into the lungs every 4 hours as needed for shortness of breath / dyspnea or wheezing 1 Inhaler 6     aspirin 81 MG tablet Take by mouth daily 30 tablet      calcium carbonate-vitamin D 500-400 MG-UNIT TABS tablt Take 1 tablet by mouth daily 180 tablet 3     fluticasone-salmeterol (ADVAIR DISKUS) 250-50 MCG/DOSE inhaler Inhale 1 puff  "into the lungs 2 times daily CLINIC FOLLOW UP FOR OTHER REFILLS 1 each 1     lisinopril (ZESTRIL) 20 MG tablet Take 1 tablet (20 mg) by mouth daily 90 tablet 3     omega 3 1000 MG CAPS Take 1 g by mouth daily 90 capsule      rosuvastatin (CRESTOR) 10 MG tablet Take 1 tablet (10 mg) by mouth daily 90 tablet 3     Allergies   Allergen Reactions     Nka [No Known Allergies]      .    Review of Systems   Constitutional: Negative for chills and fever.   HENT: Positive for hearing loss. Negative for congestion, ear pain and sore throat.    Eyes: Negative for pain and visual disturbance.   Respiratory: Positive for cough. Negative for shortness of breath.    Cardiovascular: Negative for chest pain, palpitations and peripheral edema.   Gastrointestinal: Positive for heartburn. Negative for abdominal pain, constipation, diarrhea, hematochezia and nausea.   Genitourinary: Negative for discharge, dysuria, frequency, genital sores, hematuria, impotence and urgency.   Musculoskeletal: Negative for arthralgias, joint swelling and myalgias.   Skin: Negative for rash.   Neurological: Negative for dizziness, weakness, headaches and paresthesias.   Psychiatric/Behavioral: Negative for mood changes. The patient is not nervous/anxious.        OBJECTIVE:   /76   Pulse 74   Temp 97.2  F (36.2  C) (Tympanic)   Resp 14   Ht 1.657 m (5' 5.25\")   Wt 81.5 kg (179 lb 9.6 oz)   SpO2 95%   BMI 29.66 kg/m   Estimated body mass index is 29.66 kg/m  as calculated from the following:    Height as of this encounter: 1.657 m (5' 5.25\").    Weight as of this encounter: 81.5 kg (179 lb 9.6 oz).  Physical Exam  GENERAL APPEARANCE: healthy, alert and no distress  EYES: pink conj, no icterus, PERRL, EOMI  HENT: ear canals and TM's normal, nose and mouth without ulcers or lesions, oropharynx clear and oral mucous membranes moist  NECK: no adenopathy, no asymmetry, masses, or scars and thyroid normal to palpation  RESP: lungs clear to " auscultation - no rales, rhonchi or wheezes  CV: regular rates and rhythm, normal S1 S2, no S3 or S4, no murmur, click or rub, no peripheral edema and peripheral pulses strong  ABDOMEN: soft, nontender, no hepatosplenomegaly, no masses and bowel sounds normal  RECTAL: normal sphincter tone, no rectal masses, prostate slightly enlarged but smooth, soft and nontender  MS: no musculoskeletal defects are noted and gait is age appropriate without ataxia  SKIN: no suspicious lesions or rashes  NEURO: Normal strength and tone, sensory exam grossly normal, mentation intact and speech normal    BILAT WRIST/HAND: no swelling/discoloration/deformity; full range of motion of all joints with mild wrist pain on flexion; mild hypoesthesia on left median nerve fingertips; no thenar atrophy; positive left Tinel's; positive left Phalen's; strong radial pulse    SKIN: no suspicious lesions, no rashes  Diagnostic Test Results:  none     ASSESSMENT / PLAN:   Dustin was seen today for wellness visit.    Diagnoses and all orders for this visit:    Encounter for Medicare annual wellness exam    Bilateral carpal tunnel syndrome  -     NEUROLOGY ADULT REFERRAL  -     OFFICE/OUTPT VISIT,NETO KEMP III  Discussed casuses, course and possible treatments of the condition.    Rest from repetitive hand movements as possible.  Patient said he did not tolerate bracing in the past.  Due to progresion, EMG ordered to assess severity.  Depending on severity, refer to hand therapy and/or orthopedics.  Return precautions discussed and given to patient.     Benign essential hypertension  -     lisinopril (ZESTRIL) 20 MG tablet; Take 1 tablet (20 mg) by mouth daily  -     OFFICE/OUTPT VISIT,EST,LEVL III  Controlled.  Low salt, low fat diet.   Exercise as tolerated.  Take meds as prescribed; call if with side effects.     Stage 1 mild COPD by GOLD classification (H)  Stable per patient.  Continue albuterol prn.    Ascending aorta dilatation (H)  -      "Echocardiogram Complete; Future  -     CARDIOLOGY EVAL ADULT REFERRAL; Future  -     OFFICE/OUTPT VISIT,EST,LEVL III  Reviewed with patient his previous cardiac testing.  Patient needs to reestablish care with cardiology.    Mixed hyperlipidemia  -     Lipid panel reflex to direct LDL Fasting; Future  -     rosuvastatin (CRESTOR) 10 MG tablet; Take 1 tablet (10 mg) by mouth daily  -     OFFICE/OUTPT VISIT,EST,LEVL III  Reinforced heart healthy lifestyle.  Advised his ASCVD risk. Discussed preventive benefit of statin, and posible ADRs.  Patient concurred to start.    Encounter for screening for lung cancer  Patient said he already received reminder to schedule follow unit(s) low dose chest CT scan.  He will call to schedule.    Decreased hearing of both ears  -     AUDIOLOGY ADULT REFERRAL; Future    Need for vaccination  -     Pneumococcal vaccine 23 valent PPSV23  (Pneumovax) [[id number]]  -     ADMIN MEDICARE: Pneumococcal Vaccine ()    Overweight (BMI 25.0-29.9)  Patient was advised healthy diet recommendations.  Patient was advised weekly exercise recommendations and goals.        Patient has been advised of split billing requirements and indicates understanding: Yes  COUNSELING:  Reviewed preventive health counseling, as reflected in patient instructions    Estimated body mass index is 29.66 kg/m  as calculated from the following:    Height as of this encounter: 1.657 m (5' 5.25\").    Weight as of this encounter: 81.5 kg (179 lb 9.6 oz).    Weight management plan: Discussed healthy diet and exercise guidelines    He reports that he quit smoking about 7 years ago. His smoking use included cigarettes. He has a 40.00 pack-year smoking history. He has never used smokeless tobacco.      Appropriate preventive services were discussed with this patient, including applicable screening as appropriate for cardiovascular disease, diabetes, osteopenia/osteoporosis, and glaucoma.  As appropriate for age/gender, discussed " screening for colorectal cancer, prostate cancer, breast cancer, and cervical cancer. Checklist reviewing preventive services available has been given to the patient.    Reviewed patients plan of care and provided an AVS. The Basic Care Plan (routine screening as documented in Health Maintenance) and Complex Care Plan (for patients with higher acuity and needing more deliberate coordination of services) for Dustin meets the Care Plan requirement. This Care Plan has been established and reviewed with the Patient.    Counseling Resources:  ATP IV Guidelines  Pooled Cohorts Equation Calculator  Breast Cancer Risk Calculator  Breast Cancer: Medication to Reduce Risk  FRAX Risk Assessment  ICSI Preventive Guidelines  Dietary Guidelines for Americans, 2010  [employer]or[employer]'s MyPlate  ASA Prophylaxis  Lung CA Screening    Kirill Hauser MD  [facility] [place]    Identified Health Risks:

## 2021-02-17 DIAGNOSIS — G56.03 BILATERAL CARPAL TUNNEL SYNDROME: Primary | ICD-10-CM

## 2021-02-23 ENCOUNTER — MYC MEDICAL ADVICE (OUTPATIENT)
Dept: FAMILY MEDICINE | Facility: CLINIC | Age: 66
End: 2021-02-23
Payer: COMMERCIAL

## 2021-02-23 DIAGNOSIS — Z87.891 PERSONAL HISTORY OF TOBACCO USE: Primary | ICD-10-CM

## 2021-02-23 PROCEDURE — G0296 VISIT TO DETERM LDCT ELIG: HCPCS | Performed by: FAMILY MEDICINE

## 2021-02-23 NOTE — TELEPHONE ENCOUNTER
Pended lung cancer screening, it defaulted to smart sets.     Provider please review and advise. Thank you.

## 2021-02-23 NOTE — PATIENT INSTRUCTIONS

## 2021-02-23 NOTE — TELEPHONE ENCOUNTER
Lung Cancer Screening Shared Decision Making Visit     Dustin Shah is eligible for lung cancer screening on the basis of the information provided in my signed lung cancer screening order.     I have discussed with patient the risks and benefits of screening for lung cancer with low-dose CT.     The risks include:  radiation exposure: one low dose chest CT has as much ionizing radiation as about 15 chest x-rays or 6 months of background radiation living in [place]    false positives: 96% of positive findings/nodules are NOT cancer, but some might still require additional diagnostic evaluation, including biopsy  over-diagnosis: some slow growing cancers that might never have been clinically significant will be detected and treated unnecessarily     The benefit of early detection of lung cancer is contingent upon adherence to annual screening or more frequent follow up if indicated.     Furthermore, reaping the benefits of screening requires Dustin Shah to be willing and physically able to undergo diagnostic procedures, if indicated. Although no specific guide is available for determining severity of comorbidities, it is reasonable to withhold screening in patients who have greater mortality risk from other diseases.     We did discuss that the only way to prevent lung cancer is to not smoke. Smoking cessation counseling was not given.      I did offer risk estimation using a calculator such as this one:    ShouldIScreen

## 2021-02-24 ENCOUNTER — HOSPITAL ENCOUNTER (OUTPATIENT)
Dept: CARDIOLOGY | Facility: CLINIC | Age: 66
Discharge: HOME | End: 2021-02-24
Attending: FAMILY MEDICINE | Admitting: FAMILY MEDICINE
Payer: COMMERCIAL

## 2021-02-24 DIAGNOSIS — I35.0 AORTIC VALVE STENOSIS, ETIOLOGY OF CARDIAC VALVE DISEASE UNSPECIFIED: Primary | ICD-10-CM

## 2021-02-24 DIAGNOSIS — I77.810 ASCENDING AORTA DILATATION (H): ICD-10-CM

## 2021-02-24 DIAGNOSIS — Q23.81 BICUSPID AORTIC VALVE: ICD-10-CM

## 2021-02-24 PROCEDURE — 93306 TTE W/DOPPLER COMPLETE: CPT

## 2021-02-24 PROCEDURE — 93306 TTE W/DOPPLER COMPLETE: CPT | Mod: 26 | Performed by: INTERNAL MEDICINE

## 2021-02-26 ENCOUNTER — VIRTUAL VISIT (OUTPATIENT)
Dept: CARDIOLOGY | Facility: CLINIC | Age: 66
End: 2021-02-26
Attending: FAMILY MEDICINE
Payer: COMMERCIAL

## 2021-02-26 DIAGNOSIS — I77.810 ASCENDING AORTA DILATATION (H): ICD-10-CM

## 2021-02-26 PROCEDURE — 99204 OFFICE O/P NEW MOD 45 MIN: CPT | Mod: GT | Performed by: INTERNAL MEDICINE

## 2021-02-26 NOTE — PROGRESS NOTES
dustin is a 65 year old who is being evaluated via a billable video visit.      How would you like to obtain your AVS? MyChart  If the video visit is dropped, the invitation should be resent by: Send to e-mail at: [email or web address]  Will anyone else be joining your video visit? No      Video Start Time: 2:00 PM  Video-Visit Details    Type of service:  Video Visit    Video End Time:2:30 PM    Originating Location (pt. Location): Home    Distant Location (provider location):  [facility]     Platform used for Video Visit: [employer]      This clinic visit was performed via telephone/video due to COVID-19 restrictions.    Today, I had the pleasure of connecting with Dustin Shah for a follow-up visit.  He is a pleasant 65 year old former smoker whose seen in clinic today in initial consultation for abnormal echocardiogram.  The patient recently underwent a transthoracic echocardiogram which showed bicuspid aortic valve with mild to moderate aortic stenosis with aortic valve area of 1.2 cm  and mean gradient of 18 mmHg.  The ascending aorta was also dilated at  3.9 cm.  Left ventricular ejection fraction was normal.  Findings were unchanged from echocardiogram in 11/2015    The patient today tells me that he has no symptoms and is able to do all the activities of daily living without any difficulty.  He does woodwork and is on his feet all day.  Was also able to clean an area of 3 acres without any difficulty.  He denies chest pain, chest tightness, palpitations, orthopnea, PND, lower extremity edema, presyncope or syncope.  He does not provide a family history of bicuspid aortic valve.  He had a coronary angiogram in 2015 for chest discomfort which showed mild nonobstructive coronary artery disease.  Recent blood work shows total cholesterol of 220, HDL 65,  mg/dL.  He was started on 10 mg of Crestor after this.    Assessment:  1.  Bicuspid aortic valve with mild to moderate aortic  stenosis  2.  Mild dilation of aortic root and ascending aorta  3.  No coronary artery disease on current angiogram in 2015  4.  Hyperlipidemia- mg/dL-started on Crestor 10 mg    I discussed the diagnosis of bicuspid aortic valve with the patient and that he has mild to moderate aortic stenosis.  I told him that we need to monitor him with annual echocardiograms when the time comes consider aortic valve replacement.  Fortunately, he does not have any symptoms at this time.  I also discussed that his first-degree relatives should also be screened for bicuspid aortic valve and aortopathy.  Regarding hyperlipidemia we discussed lifestyle modifications and that we will repeat blood work in 1 year and if LDL is persistently elevated I will consider increasing the dose of Crestor..    Plan:   1.  Follow-up with me in a year with repeat transthoracic echocardiogram  2.  Lifestyle modification with Mediterranean-style diet and regular exercise    Thank you for allowing me to participate in the care of Dustin TIA Alyssa    This note was completed in part using Dragon voice recognition software. Although reviewed after completion, some word and grammatical errors may occur.    Brandan Jose MD  Cardiology    Orders Placed This Encounter   Procedures     Lipid Profile     Follow-Up with Cardiologist     Echocardiogram Limited       Encounter Diagnosis   Name Primary?     Ascending aorta dilatation (H)        CURRENT MEDICATIONS:  Current Outpatient Medications   Medication Sig Dispense Refill     albuterol (PROAIR HFA/PROVENTIL HFA/VENTOLIN HFA) 108 (90 Base) MCG/ACT inhaler Inhale 2 puffs into the lungs every 4 hours as needed for shortness of breath / dyspnea or wheezing 1 Inhaler 6     aspirin 81 MG tablet Take by mouth daily 30 tablet      calcium carbonate-vitamin D 500-400 MG-UNIT TABS tablt Take 1 tablet by mouth daily 180 tablet 3     fluticasone-salmeterol (ADVAIR DISKUS) 250-50 MCG/DOSE inhaler Inhale 1 puff into  the lungs 2 times daily CLINIC FOLLOW UP FOR OTHER REFILLS 1 each 1     lisinopril (ZESTRIL) 20 MG tablet Take 1 tablet (20 mg) by mouth daily 90 tablet 3     omega 3 1000 MG CAPS Take 1 g by mouth daily 90 capsule      rosuvastatin (CRESTOR) 10 MG tablet Take 1 tablet (10 mg) by mouth daily 90 tablet 3       ALLERGIES     Allergies   Allergen Reactions     Nka [No Known Allergies]        PAST MEDICAL HISTORY:  Past Medical History:   Diagnosis Date     Bicuspid aortic valve      COPD (chronic obstructive pulmonary disease) (H) 10/2015     Hyperlipidemia 10/2015     Hypertension 10/2015       PAST SURGICAL HISTORY:  Past Surgical History:   Procedure Laterality Date     ABDOMEN SURGERY       COLONOSCOPY       COLONOSCOPY N/A 2020    Procedure: COLONOSCOPY, WITH POLYPECTOMY AND BIOPSY;  Surgeon: Emerson Meyer MD;  Location: [facility]     ENT SURGERY      Tonsels     HERNIA REPAIR       ORTHOPEDIC SURGERY  2017    broken ankle       FAMILY HISTORY:  Family History   Problem Relation Age of Onset     Diabetes Mother      Peripheral Vascular Disease Mother         mother w/ PAD     Heart Failure Mother      Heart Failure Maternal Half-Brother          of heart attack age 57     Coronary Artery Disease No family hx of        SOCIAL HISTORY:  Social History     Socioeconomic History     Marital status:      Spouse name: None     Number of children: None     Years of education: None     Highest education level: None   Occupational History     None   Social Needs     Financial resource strain: None     Food insecurity     Worry: None     Inability: None     Transportation needs     Medical: None     Non-medical: None   Tobacco Use     Smoking status: Former Smoker     Packs/day: 1.00     Years: 40.00     Pack years: 40.00     Types: Cigarettes     Quit date: 2013     Years since quittin.2     Smokeless tobacco: Never Used   Substance and Sexual Activity     Alcohol use: Yes     Comment: social      Drug use: No     Sexual activity: Not Currently   Lifestyle     Physical activity     Days per week: None     Minutes per session: None     Stress: None   Relationships     Social connections     Talks on phone: None     Gets together: None     Attends Judaism service: None     Active member of club or organization: None     Attends meetings of clubs or organizations: None     Relationship status: None     Intimate partner violence     Fear of current or ex partner: None     Emotionally abused: None     Physically abused: None     Forced sexual activity: None   Other Topics Concern     Parent/sibling w/ CABG, MI or angioplasty before 65F 55M? Yes   Social History Narrative     None       General Appearance: No distress, normal body habitus, upright.  ENT/Mouth:  Normal head shape, no evidence of injury or laceration.  EYES: No scleral icterus, normal conjunctivae  Neck:  No evidence of thyromegaly  Chest/Lungs: No audible wheezing. Non labored breathing. No cough.  Cardiovascular: No evidence of elevated jugular venous pressure.   Skin: No xanthelasma, normal skin collar. No evidence of facial lacerations.  Neurologic: No focal neurological defect. Normal speech.  Psychiatric: Alert and oriented x3

## 2021-02-26 NOTE — LETTER
2/26/2021    Kirill Hauser MD  [address] [facility] [address]    RE: Dustin Shah       Dear Colleague,    I had the pleasure of seeing Dustin Shah in the [facility].    dustin is a 65 year old who is being evaluated via a billable video visit.      How would you like to obtain your AVS? MyChart  If the video visit is dropped, the invitation should be resent by: Send to e-mail at: [email or web address]  Will anyone else be joining your video visit? No      Video Start Time: 2:00 PM  Video-Visit Details    Type of service:  Video Visit    Video End Time:2:30 PM    Originating Location (pt. Location): Home    Distant Location (provider location):  [facility] [place]     Platform used for Video Visit: [employer]      This clinic visit was performed via telephone/video due to COVID-LeTV restrictions.    Today, I had the pleasure of connecting with Dustin Shah for a follow-up visit.  He is a pleasant 65 year old former smoker whose seen in clinic today in initial consultation for abnormal echocardiogram.  The patient recently underwent a transthoracic echocardiogram which showed bicuspid aortic valve with mild to moderate aortic stenosis with aortic valve area of 1.2 cm  and mean gradient of 18 mmHg.  The ascending aorta was also dilated at  3.9 cm.  Left ventricular ejection fraction was normal.  Findings were unchanged from echocardiogram in 11/2015    The patient today tells me that he has no symptoms and is able to do all the activities of daily living without any difficulty.  He does woodwork and is on his feet all day.  Was also able to clean an area of 3 acres without any difficulty.  He denies chest pain, chest tightness, palpitations, orthopnea, PND, lower extremity edema, presyncope or syncope.  He does not provide a family history of bicuspid aortic valve.  He had a coronary angiogram in 2015 for chest discomfort  which showed mild nonobstructive coronary artery disease.  Recent blood work shows total cholesterol of 220, HDL 65,  mg/dL.  He was started on 10 mg of Crestor after this.    Assessment:  1.  Bicuspid aortic valve with mild to moderate aortic stenosis  2.  Mild dilation of aortic root and ascending aorta  3.  No coronary artery disease on current angiogram in 2015  4.  Hyperlipidemia- mg/dL-started on Crestor 10 mg    I discussed the diagnosis of bicuspid aortic valve with the patient and that he has mild to moderate aortic stenosis.  I told him that we need to monitor him with annual echocardiograms when the time comes consider aortic valve replacement.  Fortunately, he does not have any symptoms at this time.  I also discussed that his first-degree relatives should also be screened for bicuspid aortic valve and aortopathy.  Regarding hyperlipidemia we discussed lifestyle modifications and that we will repeat blood work in 1 year and if LDL is persistently elevated I will consider increasing the dose of Crestor..    Plan:   1.  Follow-up with me in a year with repeat transthoracic echocardiogram  2.  Lifestyle modification with Mediterranean-style diet and regular exercise    Thank you for allowing me to participate in the care of Dustin Shah    This note was completed in part using Dragon voice recognition software. Although reviewed after completion, some word and grammatical errors may occur.    Brandan Jose MD  Cardiology    Orders Placed This Encounter   Procedures     Lipid Profile     Follow-Up with Cardiologist     Echocardiogram Limited       Encounter Diagnosis   Name Primary?     Ascending aorta dilatation (H)        CURRENT MEDICATIONS:  Current Outpatient Medications   Medication Sig Dispense Refill     albuterol (PROAIR HFA/PROVENTIL HFA/VENTOLIN HFA) 108 (90 Base) MCG/ACT inhaler Inhale 2 puffs into the lungs every 4 hours as needed for shortness of breath / dyspnea or wheezing 1  Inhaler 6     aspirin 81 MG tablet Take by mouth daily 30 tablet      calcium carbonate-vitamin D 500-400 MG-UNIT TABS tablt Take 1 tablet by mouth daily 180 tablet 3     fluticasone-salmeterol (ADVAIR DISKUS) 250-50 MCG/DOSE inhaler Inhale 1 puff into the lungs 2 times daily CLINIC FOLLOW UP FOR OTHER REFILLS 1 each 1     lisinopril (ZESTRIL) 20 MG tablet Take 1 tablet (20 mg) by mouth daily 90 tablet 3     omega 3 1000 MG CAPS Take 1 g by mouth daily 90 capsule      rosuvastatin (CRESTOR) 10 MG tablet Take 1 tablet (10 mg) by mouth daily 90 tablet 3       ALLERGIES     Allergies   Allergen Reactions     Nka [No Known Allergies]        PAST MEDICAL HISTORY:  Past Medical History:   Diagnosis Date     Bicuspid aortic valve      COPD (chronic obstructive pulmonary disease) (H) 10/2015     Hyperlipidemia 10/2015     Hypertension 10/2015       PAST SURGICAL HISTORY:  Past Surgical History:   Procedure Laterality Date     ABDOMEN SURGERY       COLONOSCOPY       COLONOSCOPY N/A 2020    Procedure: COLONOSCOPY, WITH POLYPECTOMY AND BIOPSY;  Surgeon: Emerson Meyer MD;  Location: [facility]     ENT SURGERY      Tonsels     HERNIA REPAIR       ORTHOPEDIC SURGERY  2017    broken ankle       FAMILY HISTORY:  Family History   Problem Relation Age of Onset     Diabetes Mother      Peripheral Vascular Disease Mother         mother w/ PAD     Heart Failure Mother      Heart Failure Maternal Half-Brother          of heart attack age 57     Coronary Artery Disease No family hx of        SOCIAL HISTORY:  Social History     Socioeconomic History     Marital status:      Spouse name: None     Number of children: None     Years of education: None     Highest education level: None   Occupational History     None   Social Needs     Financial resource strain: None     Food insecurity     Worry: None     Inability: None     Transportation needs     Medical: None     Non-medical: None   Tobacco Use     Smoking status: Former  Smoker     Packs/day: 1.00     Years: 40.00     Pack years: 40.00     Types: Cigarettes     Quit date: 2013     Years since quittin.2     Smokeless tobacco: Never Used   Substance and Sexual Activity     Alcohol use: Yes     Comment: social     Drug use: No     Sexual activity: Not Currently   Lifestyle     Physical activity     Days per week: None     Minutes per session: None     Stress: None   Relationships     Social connections     Talks on phone: None     Gets together: None     Attends Adventism service: None     Active member of club or organization: None     Attends meetings of clubs or organizations: None     Relationship status: None     Intimate partner violence     Fear of current or ex partner: None     Emotionally abused: None     Physically abused: None     Forced sexual activity: None   Other Topics Concern     Parent/sibling w/ CABG, MI or angioplasty before 65F 55M? Yes   Social History Narrative     None       General Appearance: No distress, normal body habitus, upright.  ENT/Mouth:  Normal head shape, no evidence of injury or laceration.  EYES: No scleral icterus, normal conjunctivae  Neck:  No evidence of thyromegaly  Chest/Lungs: No audible wheezing. Non labored breathing. No cough.  Cardiovascular: No evidence of elevated jugular venous pressure.   Skin: No xanthelasma, normal skin collar. No evidence of facial lacerations.  Neurologic: No focal neurological defect. Normal speech.  Psychiatric: Alert and oriented x3        Thank you for allowing me to participate in the care of your patient.      Sincerely,     Brandan Jose MD     [facility]    cc:   Kirill Hauser MD  [address] [place] [address]

## 2021-03-01 ENCOUNTER — HOSPITAL ENCOUNTER (OUTPATIENT)
Dept: CT IMAGING | Facility: CLINIC | Age: 66
Discharge: HOME | End: 2021-03-01
Attending: FAMILY MEDICINE | Admitting: FAMILY MEDICINE
Payer: COMMERCIAL

## 2021-03-01 DIAGNOSIS — Z87.891 PERSONAL HISTORY OF TOBACCO USE: ICD-10-CM

## 2021-03-01 PROCEDURE — 71271 CT THORAX LUNG CANCER SCR C-: CPT

## 2021-03-10 ENCOUNTER — IMMUNIZATION (OUTPATIENT)
Dept: FAMILY MEDICINE | Facility: CLINIC | Age: 66
End: 2021-03-10
Payer: COMMERCIAL

## 2021-03-10 DIAGNOSIS — J44.9 STAGE 1 MILD COPD BY GOLD CLASSIFICATION (H): ICD-10-CM

## 2021-03-10 PROCEDURE — 0011A PR COVID VAC MODERNA 100 MCG/0.5 ML IM: CPT

## 2021-03-10 PROCEDURE — 91301 PR COVID VAC MODERNA 100 MCG/0.5 ML IM: CPT

## 2021-03-10 NOTE — TELEPHONE ENCOUNTER
Advair Diskus 250 mcg-50 mcg/dose powder for inhalation  Last Written Prescription Date:  1/25/2021  Last Fill Quantity: 1,  # refills: 1   Last office visit: 2/16/2021 with prescribing provider:  as   Future Office Visit:

## 2021-03-11 NOTE — TELEPHONE ENCOUNTER
"Requested Prescriptions   Pending Prescriptions Disp Refills     ADVAIR DISKUS 250-50 MCG/DOSE inhaler [Pharmacy Med Name: Advair Diskus 250 mcg-50 mcg/dose powder for inhalation]  1     Sig: Inhale 1 puff into the lungs 2 times daily CLINIC FOLLOW UP FOR OTHER REFILLS       Long-Acting Beta Agonist Inhalers Protocol  Failed - 3/10/2021  4:00 PM        Failed - Order for Serevent, Striverdi, or Foradil and pt has steroid inhaler        Passed - Patient is age 12 or older        Passed - Recent (12 mo) or future (30 days) visit within the authorizing provider's specialty     Patient has had an office visit with the authorizing provider or a provider within the authorizing providers department within the previous 12 mos or has a future within next 30 days. See \"Patient Info\" tab in inbasket, or \"Choose Columns\" in Meds & Orders section of the refill encounter.              Passed - Medication is active on med list       Inhaled Steroids Protocol Passed - 3/10/2021  4:00 PM        Passed - Patient is age 12 or older        Passed - Recent (12 mo) or future (30 days) visit within the authorizing provider's specialty     Patient has had an office visit with the authorizing provider or a provider within the authorizing providers department within the previous 12 mos or has a future within next 30 days. See \"Patient Info\" tab in inbasket, or \"Choose Columns\" in Meds & Orders section of the refill encounter.              Passed - Medication is active on med list             "

## 2021-03-22 ENCOUNTER — OFFICE VISIT (OUTPATIENT)
Dept: NEUROLOGY | Facility: CLINIC | Age: 66
End: 2021-03-22
Payer: COMMERCIAL

## 2021-03-22 DIAGNOSIS — G56.03 BILATERAL CARPAL TUNNEL SYNDROME: Primary | ICD-10-CM

## 2021-03-22 PROCEDURE — 95885 MUSC TST DONE W/NERV TST LIM: CPT | Performed by: PSYCHIATRY & NEUROLOGY

## 2021-03-22 PROCEDURE — 95911 NRV CNDJ TEST 9-10 STUDIES: CPT | Performed by: PSYCHIATRY & NEUROLOGY

## 2021-03-22 NOTE — LETTER
3/22/2021         RE: Dustin Shah  [address]  [place] [address]        Dear Colleague,    Thank you for referring your patient, Dustin Shah, to the [facility] [place]. Please see a copy of my visit note below.    [facility]  Electrodiagnostic Laboratory    Nerve Conduction & EMG Report          Patient: Dustin Shah YOB: 1955  Patient ID: [id number] Age: 65 Years 10 Months  Gender: Male      History & Examination:  Dustin Shah is a 65 year old man with intermittent paresthesias in the first three digits of the left hand and wrist discomfort. Milder symptoms are present on the right. Examination demonstrates normal strength and equivocal atrophy in the left thenar eminence. He is referred for evaluation of suspected median neuropathy at the wrist.     Techniques:  Sensory and motor conduction studies were done with surface recording electrodes. EMG was done with a concentric needle electrode.     Results:  The left median sensory nerve action potential was absent. A right ulnar sensory conduction study was normal. Right median sensory conduction studies demonstrated mild slowing of digital conduction only and mild attenuation of digital sensory nerve action potential amplitude with surface, but not needle, recording. Right ulnar sensory conduction studies demonstrated mild attenuation of digital sensory nerve action potential amplitude with surface, but not needle, recording. A left median motor conduction study demonstrated a compound muscle action potential amplitude near the lower limit of the normal range and moderate prolongation of distal latency. Right median motor conduction studies demonstrated mild attenuation of amplitude recording from abductor pollicis brevis and mild relative prolongation of distal latency recording from second lumbrical. Stimulation of the ulnar nerve at the wrist at low intensity while recording from the thenar  eminence resulted in a high amplitude response with a very small initial positive deflection bilaterally, suggesting an anatomic variant with prominent ulnar innervation of the thenar muscles. A left ulnar motor conduction study demonstrated mild relative conduction slowing across the elbow. A right ulnar motor conduction study was normal. Electromyography of the left abductor pollicis brevis was normal.    Interpretation:  This is an abnormal study, demonstrating electrophysiologic evidence of the followin. 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  2. Probable mild right median neuropathy at the wrist.  3. Possible mild left ulnar neuropathy at the elbow.       Emerson Garcia M.D.      Sensory NCS      Nerve / Sites Rec. Site Onset Peak NP Amp Ref. PP Amp Dist Roman Ref. Temp     ms ms  V  V  V cm m/s m/s  C   L MEDIAN - Dig II      Dig II Wrist NR NR NR 10.0 NR 14 NR 48.0 32   R MEDIAN - Dig II      Dig II Wrist (s) 3.18 4.01 6.4 10.0 6.2 14 44.1 48.0 34.3      Palm Wrist 1.67 2.34 13.8  18.0 8 48.0 48.0 34.1      Dig II Wrist (n) 3.23 4.11 13.4  8.8 14 43.4  33.6   L ULNAR - Dig V      Dig V Wrist 2.40 3.07 9.2 8.0 7.6 12.5 52.2 48.0 32.1   R ULNAR - Dig V      Dig V Wrist (s) 2.34 3.23 6.3 8.0 8.2 12.5 53.3 48.0 33.8      Palm Wrist 1.46 2.14 7.2  7.2 8 54.9 48.0 34      Dig V Wrist (n) 2.55 3.33 8.4  7.2 12.5 49.0  33.6       Motor NCS      Nerve / Sites Rec. Site Lat Ref. Amp Ref. Rel Amp Dist Roman Ref. Dur. Area Temp.     ms ms mV mV % cm m/s m/s ms %  C   L MEDIAN - APB      Wrist APB 6.09 4.40 5.2 5.0 100 8   8.23 100 34.1      Elbow APB 10.89  4.6  89.4 23 48.0 48.0 8.28 94 33.1      Wrist (uln) APB 3.54  4.9  94.4    6.67 89.2 34   R MEDIAN - APB      Wrist APB 3.65 4.40 4.5 5.0 100 8   7.45 100 32.6      Elbow APB 7.50  4.4  98.8 22 57.1 48.0 7.66 98.2 32.5      Wrist (uln) APB 3.75  5.0  111    4.74 85.6 32   L ULNAR - ADM      Wrist ADM 2.71 3.50 9.2 5.0 100 8   6.25 100 32.4      B.Elbow ADM 5.78   8.6  93.6 19 61.8 48.0 6.51 98.4 32.9      A.Elbow ADM 7.50  8.0  87.4 8.5 49.5 48.0 6.61 92.5 32.7   R ULNAR - ADM      Wrist ADM 2.60 3.50 9.1 5.0 100 8   7.14 100 33.6      B.Elbow ADM 5.94  8.2  90.1 17.5 52.5 48.0 7.34 93.5 32.5      A.Elbow ADM 7.71  7.7  84.9 9.5 53.6 48.0 7.50 88.3 32   R MEDIAN - II Lumb      Median II Lumb 3.54  1.2  100 10   6.98 100 34.1      Ulnar Palm Int 2.60  5.0  422 10   5.89 298 34.3       EMG Summary Table     Spontaneous MUAP Recruitment    IA Fib/PSW Fasc H.F. Amp Dur. PPP Pattern   L. ABD POLL BREVIS N None None None N N None Normal                                Again, thank you for allowing me to participate in the care of your patient.        Sincerely,        Emerson Garcia MD

## 2021-03-22 NOTE — PROGRESS NOTES
[facility]  Electrodiagnostic Laboratory    Nerve Conduction & EMG Report          Patient: Dustin Shah YOB: 1955  Patient ID: [id number] Age: 65 Years 10 Months  Gender: Male      History & Examination:  Dustin Shah is a 65 year old man with intermittent paresthesias in the first three digits of the left hand and wrist discomfort. Milder symptoms are present on the right. Examination demonstrates normal strength and equivocal atrophy in the left thenar eminence. He is referred for evaluation of suspected median neuropathy at the wrist.     Techniques:  Sensory and motor conduction studies were done with surface recording electrodes. EMG was done with a concentric needle electrode.     Results:  The left median sensory nerve action potential was absent. A right ulnar sensory conduction study was normal. Right median sensory conduction studies demonstrated mild slowing of digital conduction only and mild attenuation of digital sensory nerve action potential amplitude with surface, but not needle, recording. Right ulnar sensory conduction studies demonstrated mild attenuation of digital sensory nerve action potential amplitude with surface, but not needle, recording. A left median motor conduction study demonstrated a compound muscle action potential amplitude near the lower limit of the normal range and moderate prolongation of distal latency. Right median motor conduction studies demonstrated mild attenuation of amplitude recording from abductor pollicis brevis and mild relative prolongation of distal latency recording from second lumbrical. Stimulation of the ulnar nerve at the wrist at low intensity while recording from the thenar eminence resulted in a high amplitude response with a very small initial positive deflection bilaterally, suggesting an anatomic variant with prominent ulnar innervation of the thenar muscles. A left ulnar motor conduction study demonstrated mild relative  conduction slowing across the elbow. A right ulnar motor conduction study was normal. Electromyography of the left abductor pollicis brevis was normal.    Interpretation:  This is an abnormal study, demonstrating electrophysiologic evidence of the followin. 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  2. Probable mild right median neuropathy at the wrist.  3. Possible mild left ulnar neuropathy at the elbow.       Emerson Garcia M.D.      Sensory NCS      Nerve / Sites Rec. Site Onset Peak NP Amp Ref. PP Amp Dist Roman Ref. Temp     ms ms  V  V  V cm m/s m/s  C   L MEDIAN - Dig II      Dig II Wrist NR NR NR 10.0 NR 14 NR 48.0 32   R MEDIAN - Dig II      Dig II Wrist (s) 3.18 4.01 6.4 10.0 6.2 14 44.1 48.0 34.3      Palm Wrist 1.67 2.34 13.8  18.0 8 48.0 48.0 34.1      Dig II Wrist (n) 3.23 4.11 13.4  8.8 14 43.4  33.6   L ULNAR - Dig V      Dig V Wrist 2.40 3.07 9.2 8.0 7.6 12.5 52.2 48.0 32.1   R ULNAR - Dig V      Dig V Wrist (s) 2.34 3.23 6.3 8.0 8.2 12.5 53.3 48.0 33.8      Palm Wrist 1.46 2.14 7.2  7.2 8 54.9 48.0 34      Dig V Wrist (n) 2.55 3.33 8.4  7.2 12.5 49.0  33.6       Motor NCS      Nerve / Sites Rec. Site Lat Ref. Amp Ref. Rel Amp Dist Roman Ref. Dur. Area Temp.     ms ms mV mV % cm m/s m/s ms %  C   L MEDIAN - APB      Wrist APB 6.09 4.40 5.2 5.0 100 8   8.23 100 34.1      Elbow APB 10.89  4.6  89.4 23 48.0 48.0 8.28 94 33.1      Wrist (uln) APB 3.54  4.9  94.4    6.67 89.2 34   R MEDIAN - APB      Wrist APB 3.65 4.40 4.5 5.0 100 8   7.45 100 32.6      Elbow APB 7.50  4.4  98.8 22 57.1 48.0 7.66 98.2 32.5      Wrist (uln) APB 3.75  5.0  111    4.74 85.6 32   L ULNAR - ADM      Wrist ADM 2.71 3.50 9.2 5.0 100 8   6.25 100 32.4      B.Elbow ADM 5.78  8.6  93.6 19 61.8 48.0 6.51 98.4 32.9      A.Elbow ADM 7.50  8.0  87.4 8.5 49.5 48.0 6.61 92.5 32.7   R ULNAR - ADM      Wrist ADM 2.60 3.50 9.1 5.0 100 8   7.14 100 33.6      B.Elbow ADM 5.94  8.2  90.1 17.5 52.5 48.0 7.34 93.5 32.5      A.Elbow ADM 7.71   7.7  84.9 9.5 53.6 48.0 7.50 88.3 32   R MEDIAN - II Lumb      Median II Lumb 3.54  1.2  100 10   6.98 100 34.1      Ulnar Palm Int 2.60  5.0  422 10   5.89 298 34.3       EMG Summary Table     Spontaneous MUAP Recruitment    IA Fib/PSW Fasc H.F. Amp Dur. PPP Pattern   L. ABD POLL BREVIS N None None None N N None Normal

## 2021-03-30 ENCOUNTER — OFFICE VISIT (OUTPATIENT)
Dept: AUDIOLOGY | Facility: CLINIC | Age: 66
End: 2021-03-30
Payer: COMMERCIAL

## 2021-03-30 DIAGNOSIS — H90.3 SENSORINEURAL HEARING LOSS, ASYMMETRICAL: Primary | ICD-10-CM

## 2021-03-30 PROCEDURE — 92557 COMPREHENSIVE HEARING TEST: CPT | Performed by: AUDIOLOGIST

## 2021-03-30 PROCEDURE — 92550 TYMPANOMETRY & REFLEX THRESH: CPT | Performed by: AUDIOLOGIST

## 2021-03-30 PROCEDURE — 99207 PR NO CHARGE LOS: CPT | Performed by: AUDIOLOGIST

## 2021-03-30 NOTE — PROGRESS NOTES
AUDIOLOGY REPORT    SUBJECTIVE:  Dustin Shah is a 65 year old male who was seen at [facility] for an audiologic evaluation, referred by Dr. Hauser.  The patient has been seen previously in this clinic on 10/27/2008 for assessment and results indicated a normal to moderate-severe sensorineural hearing loss bilaterally.  The patient reports greater difficulty hearing his spouse, the TV and in groups. He reports a history of occupational and recreational noise exposure. The patient denies bilateral tinnitus and bilateral otalgia.     OBJECTIVE:    Otoscopic exam indicates ears are clear of cerumen bilaterally       Pure Tone Thresholds assessed using conventional audiometry with good  reliability from 250-8000 Hz bilaterally using insert earphones and circumaural headphones     RIGHT:  normal, mild, moderate and severe sensorineural hearing loss    LEFT:    normal, mild, moderate and severe sensorineural hearing loss    Tympanogram:    RIGHT: normal eardrum mobility    LEFT:   normal eardrum mobility    Reflexes (reported by stimulus ear 1000 Hz):     RIGHT: Ipsilateral is absent    RIGHT: Contralateral is abssent    LEFT: Ipsilateral is present    LEFT: Contralateral is present    Speech Reception Threshold:    RIGHT: 35 dB HL    LEFT:   30 dB HL  Word Recognition Score:     RIGHT: 64% at 75 dB HL using NU-6 recorded word list.    LEFT:   80% at 80 dB HL using NU-6 recorded word list.      ASSESSMENT:   Normal sloping to severe asymmetrical sensorineural hearing loss bilaterally.     Compared to patient's previous audiogram dated 10/27/2008, hearing has decreased from 5290-2068 Hz bilaterally.  Today s results were discussed with the patient in detail.     PLAN: It is recommended that the patient be seen by Dr. Conn for medical evaluation of their ears and hearing evaluation. Patient was counseled regarding hearing loss and impact on communication. Patient is a good candidate for  amplification at this time. A [facility] information packet was given to the patient. Please call this clinic with questions regarding these results or recommendations.        Charlene Thompson M.A. -AAA  Clinical audiologist [place] # [id number]  3/30/2021

## 2021-04-07 ENCOUNTER — IMMUNIZATION (OUTPATIENT)
Dept: FAMILY MEDICINE | Facility: CLINIC | Age: 66
End: 2021-04-07
Attending: FAMILY MEDICINE
Payer: COMMERCIAL

## 2021-04-07 PROCEDURE — 0012A PR COVID VAC MODERNA 100 MCG/0.5 ML IM: CPT

## 2021-04-07 PROCEDURE — 91301 PR COVID VAC MODERNA 100 MCG/0.5 ML IM: CPT

## 2021-04-07 NOTE — PROGRESS NOTES
CHIEF COMPLAINT: Hearing Concern      HISTORY OF PRESENT ILLNESS    marco was seen at the behest of Kirill Hauser for hearing.  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 denies any dizziness or hearing or tinnitus.  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  He has no new concerns no other ear nose or throat related concerns today.  No ear pain or drainage.  No history of otologic disease otologic surgery.         REVIEW OF SYSTEMS    Review of Systems as per HPI and PMHx, otherwise 10 system review system are negative.     Nka [no known allergies]     There were no vitals taken for this visit.    HEAD: Normal appearance and symmetry:  No cutaneous lesions.      NECK:  supple     EARS: normal TM, EACs     NOSE:     Dorsum:   straight  Septum:  midline  Mucosa:  moist  Inferior turbinates:  wnl        ORAL CAVITY/OROPHARYNX:     Lips:  Normal.  Tongue: normal, midline  Mucosa:   no lesions  Tonsils:  1+     NECK:  Trachea:  midline.              Thyroid:  normal              Adenopathy:  none        NEURO:   Alert and Oriented     GAIT AND STATION:  normal     RESPIRATORY:   Symmetry and Respiratory effort     PSYCH:  Normal mood and affect     SKIN:   warm and dry     Audiometry today shows a noise-induced hearing loss in a noise-induced pattern that is mild to moderate to severe.  Word recognition is diminished 64% in the right ear and 80% in the left ear.    IMPRESSION:    Encounter Diagnosis   Name Primary?     Hearing difficulty of both ears Yes          RECOMMENDATIONS:      Hearing aid consulation  Medically cleared for hearing aids  May benefit from BiCROS aid

## 2021-04-08 ENCOUNTER — OFFICE VISIT (OUTPATIENT)
Dept: OTOLARYNGOLOGY | Facility: CLINIC | Age: 66
End: 2021-04-08
Payer: COMMERCIAL

## 2021-04-08 VITALS
TEMPERATURE: 97.8 F | HEIGHT: 65 IN | HEART RATE: 87 BPM | WEIGHT: 180 LBS | BODY MASS INDEX: 29.99 KG/M2 | DIASTOLIC BLOOD PRESSURE: 85 MMHG | SYSTOLIC BLOOD PRESSURE: 124 MMHG

## 2021-04-08 DIAGNOSIS — Z57.0 OCCUPATIONAL EXPOSURE TO NOISE: ICD-10-CM

## 2021-04-08 DIAGNOSIS — H90.3 SENSORINEURAL HEARING LOSS (SNHL) OF BOTH EARS: ICD-10-CM

## 2021-04-08 DIAGNOSIS — H91.93 HEARING DIFFICULTY OF BOTH EARS: Primary | ICD-10-CM

## 2021-04-08 PROCEDURE — 99203 OFFICE O/P NEW LOW 30 MIN: CPT | Performed by: OTOLARYNGOLOGY

## 2021-04-08 SDOH — HEALTH STABILITY - PHYSICAL HEALTH: OCCUPATIONAL EXPOSURE TO NOISE: Z57.0

## 2021-04-08 ASSESSMENT — MIFFLIN-ST. JEOR: SCORE: 1532.31

## 2021-04-08 NOTE — PATIENT INSTRUCTIONS
Per physician instructions      If you have questions or concerns on any instructions given to you by your provider today or if you need to schedule an appointment, you can reach us at [phone].   Hearing aid consultation   Return annually for hearing test

## 2021-04-08 NOTE — LETTER
4/8/2021         RE: Dustin Shah  [address]  [place] [address]        Dear Colleague,    Thank you for referring your patient, Dustin Shah, to the [facility]. Please see a copy of my visit note below.    CHIEF COMPLAINT: Hearing Concern      HISTORY OF PRESENT ILLNESS    dustin was seen at the behest of Kirill Hauser for hearing.  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 denies any dizziness or hearing or tinnitus.  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  He has no new concerns no other ear nose or throat related concerns today.  No ear pain or drainage.  No history of otologic disease otologic surgery.         REVIEW OF SYSTEMS    Review of Systems as per HPI and PMHx, otherwise 10 system review system are negative.     Nka [no known allergies]     There were no vitals taken for this visit.    HEAD: Normal appearance and symmetry:  No cutaneous lesions.      NECK:  supple     EARS: normal TM, EACs     NOSE:     Dorsum:   straight  Septum:  midline  Mucosa:  moist  Inferior turbinates:  wnl        ORAL CAVITY/OROPHARYNX:     Lips:  Normal.  Tongue: normal, midline  Mucosa:   no lesions  Tonsils:  1+     NECK:  Trachea:  midline.              Thyroid:  normal              Adenopathy:  none        NEURO:   Alert and Oriented     GAIT AND STATION:  normal     RESPIRATORY:   Symmetry and Respiratory effort     PSYCH:  Normal mood and affect     SKIN:   warm and dry     Audiometry today shows a noise-induced hearing loss in a noise-induced pattern that is mild to moderate to severe.  Word recognition is diminished 64% in the right ear and 80% in the left ear.    IMPRESSION:    Encounter Diagnosis   Name Primary?     Hearing difficulty of both ears Yes          RECOMMENDATIONS:      Hearing aid consulation  Medically cleared for  hearing aids  May benefit from BiCROS aid           Again, thank you for allowing me to participate in the care of your patient.        Sincerely,        Chuy An MD

## 2021-04-08 NOTE — NURSING NOTE
"Initial /85 (BP Location: Right arm, Patient Position: Sitting, Cuff Size: Adult Large)   Pulse 87   Temp 97.8  F (36.6  C) (Tympanic)   Ht 1.657 m (5' 5.25\")   Wt 81.6 kg (180 lb)   BMI 29.72 kg/m   Estimated body mass index is 29.72 kg/m  as calculated from the following:    Height as of this encounter: 1.657 m (5' 5.25\").    Weight as of this encounter: 81.6 kg (180 lb). .    Marian Huynh CMA    "

## 2021-05-12 ENCOUNTER — OFFICE VISIT (OUTPATIENT)
Dept: AUDIOLOGY | Facility: CLINIC | Age: 66
End: 2021-05-12
Payer: COMMERCIAL

## 2021-05-12 DIAGNOSIS — H90.3 SENSORINEURAL HEARING LOSS, ASYMMETRICAL: Primary | ICD-10-CM

## 2021-05-12 PROCEDURE — 99207 PR NO CHARGE LOS: CPT | Performed by: AUDIOLOGIST

## 2021-05-12 NOTE — PROGRESS NOTES
[facility]        SUBJECTIVE:  Dustin Shah , 66 year old male comes in for a hearing aid consultation.     OBJECTIVE:  After review of his possible hearing aid insurance benefits the patient elects to go no further with this appointment. He would like to contact the [employer] [facility] available through his [employer] insurance.     ASSESSMENT/PLAN:    Return to clinic as needed.     Charlene RODRIGUEZ, #[id number]

## 2021-08-17 ENCOUNTER — LAB (OUTPATIENT)
Dept: LAB | Facility: CLINIC | Age: 66
End: 2021-08-17
Payer: COMMERCIAL

## 2021-08-17 DIAGNOSIS — E78.2 MIXED HYPERLIPIDEMIA: ICD-10-CM

## 2021-08-17 LAB
CHOLEST SERPL-MCNC: 177 MG/DL
FASTING STATUS PATIENT QL REPORTED: YES
HDLC SERPL-MCNC: 83 MG/DL
LDLC SERPL CALC-MCNC: 82 MG/DL
NONHDLC SERPL-MCNC: 94 MG/DL
TRIGL SERPL-MCNC: 59 MG/DL

## 2021-08-17 PROCEDURE — 36415 COLL VENOUS BLD VENIPUNCTURE: CPT

## 2021-08-17 PROCEDURE — 80061 LIPID PANEL: CPT

## 2021-09-12 ENCOUNTER — HEALTH MAINTENANCE LETTER (OUTPATIENT)
Age: 66
End: 2021-09-12

## 2021-09-13 ENCOUNTER — IMMUNIZATION (OUTPATIENT)
Dept: FAMILY MEDICINE | Facility: CLINIC | Age: 66
End: 2021-09-13
Payer: COMMERCIAL

## 2021-09-13 PROCEDURE — 90662 IIV NO PRSV INCREASED AG IM: CPT

## 2021-09-13 PROCEDURE — 90471 IMMUNIZATION ADMIN: CPT

## 2022-01-16 DIAGNOSIS — E78.2 MIXED HYPERLIPIDEMIA: ICD-10-CM

## 2022-01-18 RX ORDER — ROSUVASTATIN CALCIUM 10 MG/1
10 TABLET, COATED ORAL DAILY
Qty: 90 TABLET | Refills: 0 | Status: SHIPPED | OUTPATIENT
Start: 2022-01-18 | End: 2022-05-03

## 2022-02-17 ASSESSMENT — ENCOUNTER SYMPTOMS
FREQUENCY: 0
ABDOMINAL PAIN: 0
JOINT SWELLING: 0
PARESTHESIAS: 0
NAUSEA: 0
HEADACHES: 0
FEVER: 0
EYE PAIN: 0
SHORTNESS OF BREATH: 1
CHILLS: 0
COUGH: 1
SORE THROAT: 0
DYSURIA: 0
PALPITATIONS: 0
CONSTIPATION: 0
WEAKNESS: 0
HEMATURIA: 0
MYALGIAS: 0
ARTHRALGIAS: 0
DIZZINESS: 0
HEARTBURN: 0
HEMATOCHEZIA: 0
DIARRHEA: 0

## 2022-02-17 ASSESSMENT — ACTIVITIES OF DAILY LIVING (ADL): CURRENT_FUNCTION: NO ASSISTANCE NEEDED

## 2022-02-22 ENCOUNTER — CARE COORDINATION (OUTPATIENT)
Dept: CARDIOLOGY | Facility: CLINIC | Age: 67
End: 2022-02-22

## 2022-02-22 ENCOUNTER — MYC MEDICAL ADVICE (OUTPATIENT)
Dept: CARDIOLOGY | Facility: CLINIC | Age: 67
End: 2022-02-22
Payer: COMMERCIAL

## 2022-02-22 ENCOUNTER — LAB (OUTPATIENT)
Dept: LAB | Facility: CLINIC | Age: 67
End: 2022-02-22
Payer: COMMERCIAL

## 2022-02-22 DIAGNOSIS — I77.810 ASCENDING AORTA DILATATION (H): ICD-10-CM

## 2022-02-22 DIAGNOSIS — I10 BENIGN ESSENTIAL HYPERTENSION: ICD-10-CM

## 2022-02-22 LAB
CHOLEST SERPL-MCNC: 178 MG/DL
FASTING STATUS PATIENT QL REPORTED: YES
HDLC SERPL-MCNC: 63 MG/DL
LDLC SERPL CALC-MCNC: 89 MG/DL
NONHDLC SERPL-MCNC: 115 MG/DL
TRIGL SERPL-MCNC: 130 MG/DL

## 2022-02-22 PROCEDURE — 80061 LIPID PANEL: CPT | Performed by: INTERNAL MEDICINE

## 2022-02-22 PROCEDURE — 80048 BASIC METABOLIC PNL TOTAL CA: CPT | Performed by: INTERNAL MEDICINE

## 2022-02-22 PROCEDURE — 36415 COLL VENOUS BLD VENIPUNCTURE: CPT | Performed by: INTERNAL MEDICINE

## 2022-02-22 NOTE — PROGRESS NOTES
has sent Dustin a separate [employer] message about his results from the lipid profile.  Dates are today 2\22\22, and last year 8\17\21      It seems that his labs are a bit worse, wondering if he stopped taking the Crestor for any reason, requested he let us know.  Dustin does have CICI scheduled for 3\31\22, but no cardiology follow up with Dr. Jose set up yet.   has extended the order for that appointment into April, and sent request to [place]  to reach out to patient.   has also notified nurse Debra Wyatt and Yin Moctezuma that I sent this request to patient.

## 2022-02-23 ENCOUNTER — OFFICE VISIT (OUTPATIENT)
Dept: FAMILY MEDICINE | Facility: CLINIC | Age: 67
End: 2022-02-23
Payer: COMMERCIAL

## 2022-02-23 VITALS
TEMPERATURE: 97.5 F | WEIGHT: 167.6 LBS | RESPIRATION RATE: 16 BRPM | DIASTOLIC BLOOD PRESSURE: 80 MMHG | SYSTOLIC BLOOD PRESSURE: 110 MMHG | BODY MASS INDEX: 27.92 KG/M2 | HEIGHT: 65 IN | OXYGEN SATURATION: 98 % | HEART RATE: 80 BPM

## 2022-02-23 DIAGNOSIS — I10 BENIGN ESSENTIAL HYPERTENSION: ICD-10-CM

## 2022-02-23 DIAGNOSIS — Z87.891 PERSONAL HISTORY OF TOBACCO USE: ICD-10-CM

## 2022-02-23 DIAGNOSIS — J44.9 COPD, MODERATE (H): ICD-10-CM

## 2022-02-23 DIAGNOSIS — Z00.00 ENCOUNTER FOR MEDICARE ANNUAL WELLNESS EXAM: Primary | ICD-10-CM

## 2022-02-23 DIAGNOSIS — I77.810 ASCENDING AORTA DILATATION (H): ICD-10-CM

## 2022-02-23 LAB
ANION GAP SERPL CALCULATED.3IONS-SCNC: 3 MMOL/L (ref 3–14)
BUN SERPL-MCNC: 18 MG/DL (ref 7–30)
CALCIUM SERPL-MCNC: 10 MG/DL (ref 8.5–10.1)
CHLORIDE BLD-SCNC: 103 MMOL/L (ref 94–109)
CO2 SERPL-SCNC: 29 MMOL/L (ref 20–32)
CREAT SERPL-MCNC: 0.94 MG/DL (ref 0.66–1.25)
GFR SERPL CREATININE-BSD FRML MDRD: 89 ML/MIN/1.73M2
GLUCOSE BLD-MCNC: 97 MG/DL (ref 70–99)
POTASSIUM BLD-SCNC: 4.5 MMOL/L (ref 3.4–5.3)
SODIUM SERPL-SCNC: 135 MMOL/L (ref 133–144)

## 2022-02-23 PROCEDURE — 99397 PER PM REEVAL EST PAT 65+ YR: CPT | Performed by: FAMILY MEDICINE

## 2022-02-23 PROCEDURE — 99214 OFFICE O/P EST MOD 30 MIN: CPT | Mod: 25 | Performed by: FAMILY MEDICINE

## 2022-02-23 PROCEDURE — G0296 VISIT TO DETERM LDCT ELIG: HCPCS | Performed by: FAMILY MEDICINE

## 2022-02-23 RX ORDER — ALBUTEROL SULFATE 90 UG/1
2 AEROSOL, METERED RESPIRATORY (INHALATION) EVERY 4 HOURS PRN
Qty: 18 G | Refills: 11 | Status: SHIPPED | OUTPATIENT
Start: 2022-02-23 | End: 2024-02-20

## 2022-02-23 RX ORDER — LISINOPRIL 20 MG/1
20 TABLET ORAL DAILY
Qty: 90 TABLET | Refills: 3 | Status: SHIPPED | OUTPATIENT
Start: 2022-02-23 | End: 2023-03-07

## 2022-02-23 ASSESSMENT — ENCOUNTER SYMPTOMS
NAUSEA: 0
HEMATOCHEZIA: 0
ARTHRALGIAS: 0
ABDOMINAL PAIN: 0
CONSTIPATION: 0
DIZZINESS: 0
JOINT SWELLING: 0
HEARTBURN: 0
EYE PAIN: 0
WEAKNESS: 0
FREQUENCY: 0
FEVER: 0
SORE THROAT: 0
PALPITATIONS: 0
SHORTNESS OF BREATH: 1
DYSURIA: 0
COUGH: 1
CHILLS: 0
PARESTHESIAS: 0
HEADACHES: 0
MYALGIAS: 0
HEMATURIA: 0
DIARRHEA: 0

## 2022-02-23 ASSESSMENT — PAIN SCALES - GENERAL: PAINLEVEL: NO PAIN (0)

## 2022-02-23 ASSESSMENT — ACTIVITIES OF DAILY LIVING (ADL): CURRENT_FUNCTION: NO ASSISTANCE NEEDED

## 2022-02-23 NOTE — PROGRESS NOTES
"SUBJECTIVE:   Dustin Shah is a 66 year old male who presents for Preventive Visit.      Patient has been advised of split billing requirements and indicates understanding: Yes  Are you in the first 12 months of your Medicare coverage?  Does not have medicare    Healthy Habits:     In general, how would you rate your overall health?  Good    Frequency of exercise:  None    Do you usually eat at least 4 servings of fruit and vegetables a day, include whole grains    & fiber and avoid regularly eating high fat or \"junk\" foods?  No    Taking medications regularly:  Yes    Medication side effects:  Lightheadedness    Ability to successfully perform activities of daily living:  No assistance needed    Home Safety:  No safety concerns identified    Hearing Impairment:  Difficulty following a conversation in a noisy restaurant or crowded room, feel that people are mumbling or not speaking clearly, difficulty following dialogue in the theater, difficult to understand a speaker at a public meeting or Taoism service, need to ask people to speak up or repeat themselves, find that men's voices are easier to understand than woman's, difficulty understanding soft or whispered speech and difficulty understanding speech on the telephone    In the past 6 months, have you been bothered by leaking of urine?  No    In general, how would you rate your overall mental or emotional health?  Excellent      PHQ-2 Total Score: 0    Additional concerns today:  No    Do you feel safe in your environment? Yes    Have you ever done Advance Care Planning? (For example, a Health Directive, POLST, or a discussion with a medical provider or your loved ones about your wishes): No, advance care planning information given to patient to review.  Patient plans to discuss their wishes with loved ones or provider.      Patient will be going to audiology clinic next week.    Fall risk  Fallen 2 or more times in the past year?: No  Any fall with injury in " the past year?: No    Cognitive Screening   1) Repeat 3 items (Leader, Season, Table)    2) Clock draw: NORMAL  3) 3 item recall: Recalls 3 objects  Results: 3 items recalled: COGNITIVE IMPAIRMENT LESS LIKELY    Mini-CogTM Copyright S [employer]. Licensed by the author for use in [facility]; reprinted with permission (juan@[facility]). All rights reserved.      Do you have sleep apnea, excessive snoring or daytime drowsiness?: no    Reviewed and updated as needed this visit by clinical staff   Tobacco  Allergies  Meds   Med Hx  Surg Hx  Fam Hx  Soc Hx        Reviewed and updated as needed this visit by Provider                 Social History     Tobacco Use     Smoking status: Former Smoker     Packs/day: 1.00     Years: 40.00     Pack years: 40.00     Types: Cigarettes     Quit date: 2013     Years since quittin.2     Smokeless tobacco: Never Used   Substance Use Topics     Alcohol use: Yes     Comment: social     If you drink alcohol do you typically have >3 drinks per day or >7 drinks per week? No    Alcohol Use 2022   Prescreen: >3 drinks/day or >7 drinks/week? -   Prescreen: >3 drinks/day or >7 drinks/week? No   No flowsheet data found.        Needs order for lung scan for screening for lung cancer.    Current providers sharing in care for this patient include:   Patient Care Team:  Kirill Hauser MD as PCP - General (Family Practice)  Kirill Hauser MD as Assigned PCP  Brandan Jose MD as Assigned Heart and Vascular Provider  Emerson Garcia MD as Assigned Neuroscience Provider  Chuy An MD as Assigned Surgical Provider    The following health maintenance items are reviewed in Epic and correct as of today:  Health Maintenance Due   Topic Date Due     COPD ACTION PLAN  Never done     FALL RISK ASSESSMENT  2022     LUNG CANCER SCREENING  2022     Patient Active Problem List   Diagnosis     HL (hearing loss)     COPD (chronic obstructive pulmonary  disease) (H)     CARDIOVASCULAR SCREENING; LDL GOAL LESS THAN 130     Benign essential hypertension     Former smoker     Undiagnosed cardiac murmurs     Hyperlipidemia with target LDL less than 100     Status post coronary angiogram     Stage 1 mild COPD by GOLD classification (H)     Advanced directives, counseling/discussion     Tubular adenoma of colon     Bicuspid aortic valve     Overweight (BMI 25.0-29.9)     Ascending aorta dilatation (H)     Past Surgical History:   Procedure Laterality Date     ABDOMEN SURGERY       COLONOSCOPY       COLONOSCOPY N/A 2020    Procedure: COLONOSCOPY, WITH POLYPECTOMY AND BIOPSY;  Surgeon: Emerson Meyer MD;  Location: [facility]     ENT SURGERY      Tonsels     HERNIA REPAIR       ORTHOPEDIC SURGERY  2017    broken ankle       Social History     Tobacco Use     Smoking status: Former Smoker     Packs/day: 1.00     Years: 40.00     Pack years: 40.00     Types: Cigarettes     Quit date: 2013     Years since quittin.2     Smokeless tobacco: Never Used   Substance Use Topics     Alcohol use: Yes     Comment: social     Family History   Problem Relation Age of Onset     Diabetes Mother      Peripheral Vascular Disease Mother         mother w/ PAD     Heart Failure Mother      Heart Failure Maternal Half-Brother          of heart attack age 57     Coronary Artery Disease No family hx of          Current Outpatient Medications   Medication Sig Dispense Refill     ADVAIR DISKUS 250-50 MCG/DOSE inhaler Inhale 1 puff into the lungs 2 times daily CLINIC FOLLOW UP FOR OTHER REFILLS 1 each 11     albuterol (PROAIR HFA/PROVENTIL HFA/VENTOLIN HFA) 108 (90 Base) MCG/ACT inhaler Inhale 2 puffs into the lungs every 4 hours as needed for shortness of breath / dyspnea or wheezing 1 Inhaler 6     aspirin 81 MG tablet Take by mouth daily 30 tablet      calcium carbonate-vitamin D 500-400 MG-UNIT TABS tablt Take 1 tablet by mouth daily 180 tablet 3     lisinopril (ZESTRIL) 20 MG  "tablet Take 1 tablet (20 mg) by mouth daily 90 tablet 3     omega 3 1000 MG CAPS Take 1 g by mouth daily 90 capsule      rosuvastatin (CRESTOR) 10 MG tablet Take 1 tablet (10 mg) by mouth daily 90 tablet 0     Allergies   Allergen Reactions     Nka [No Known Allergies]      Pneumonia Vaccine: UTD        Review of Systems   Constitutional: Negative for chills and fever.   HENT: Positive for hearing loss. Negative for congestion, ear pain and sore throat.    Eyes: Negative for pain and visual disturbance.   Respiratory: Positive for cough and shortness of breath.    Cardiovascular: Negative for chest pain, palpitations and peripheral edema.   Gastrointestinal: Negative for abdominal pain, constipation, diarrhea, heartburn, hematochezia and nausea.   Genitourinary: Negative for dysuria, frequency, genital sores, hematuria, impotence, penile discharge and urgency.   Musculoskeletal: Negative for arthralgias, joint swelling and myalgias.   Skin: Negative for rash.   Neurological: Negative for dizziness, weakness, headaches and paresthesias.   Psychiatric/Behavioral: Negative for mood changes.     Patient sees cardiology for the ascending aorta dilatation - scheduled for echo in a few weeks.    OBJECTIVE:   /80 (BP Location: Left arm, Patient Position: Chair, Cuff Size: Adult Regular)   Pulse 80   Temp 97.5  F (36.4  C) (Tympanic)   Resp 16   Ht 1.651 m (5' 5\")   Wt 76 kg (167 lb 9.6 oz)   SpO2 98%   BMI 27.89 kg/m   Estimated body mass index is 27.89 kg/m  as calculated from the following:    Height as of this encounter: 1.651 m (5' 5\").    Weight as of this encounter: 76 kg (167 lb 9.6 oz).  Physical Exam  GENERAL APPEARANCE: slightly overweight,  alert and no distress  EYES: pink conj, no icterus, PERRL, EOMI  HENT: ear canals and TM's normal, nose and mouth without ulcers or lesions, oropharynx clear and oral mucous membranes moist  NECK: no adenopathy, no asymmetry, masses, or scars and thyroid normal to " palpation  RESP: lungs clear to auscultation - no rales, rhonchi or wheezes  CV: regular rates and rhythm, normal S1 S2, no S3 or S4, grade 2 systolic murmur, no click or rub, no peripheral edema and peripheral pulses strong  ABDOMEN: soft, nontender, no hepatosplenomegaly, no masses and bowel sounds normal  RECTAL: normal sphincter tone, no rectal masses, prostate slightly enlarged but smooth, soft and nontender  MS: no musculoskeletal defects are noted and gait is age appropriate without ataxia  SKIN: no suspicious lesions or rashes  NEURO: Normal strength and tone, sensory exam grossly normal, mentation intact and speech normal    Diagnostic Test Results:  none     ASSESSMENT / PLAN:   Dustin was seen today for physical.    Diagnoses and all orders for this visit:    Encounter for Medicare annual wellness exam  Patient was advised on recommended screening and preventive health recommendations.  He verbalized understanding and agreed to the plans below.    Benign essential hypertension  -     lisinopril (ZESTRIL) 20 MG tablet; Take 1 tablet (20 mg) by mouth daily  -     Basic metabolic panel; Future  -     OFFICE/OUTPT VISIT,EST,LEVL III  Controlled.  Low salt, low fat diet.   Exercise as tolerated.  Take meds as prescribed; call if with side effects.     COPD, moderate (H)  -     albuterol (PROAIR HFA/PROVENTIL HFA/VENTOLIN HFA) 108 (90 Base) MCG/ACT inhaler; Inhale 2 puffs into the lungs every 4 hours as needed for shortness of breath / dyspnea or wheezing  -     fluticasone-salmeterol (ADVAIR DISKUS) 250-50 MCG/DOSE inhaler; Inhale 1 puff into the lungs 2 times daily  -     OFFICE/OUTPT VISIT,EST,LEVL III  Stable per patient.  He reports he has never used albuterol for the last year.  UTD with vaccines.    Ascending aorta dilatation (H)  Seeing cardiology and is scheduled for surveillance echo soon.    Personal history of tobacco use  -     Prof Fee: Shared Decision Making Visit for Lung Cancer Screening  -      "CT Chest Lung Cancer Scrn Low Dose wo; Future    Other orders  -     REVIEW OF HEALTH MAINTENANCE PROTOCOL ORDERS        Patient has been advised of split billing requirements and indicates understanding: Yes    COUNSELING:  Reviewed preventive health counseling, as reflected in patient instructions    Estimated body mass index is 27.89 kg/m  as calculated from the following:    Height as of this encounter: 1.651 m (5' 5\").    Weight as of this encounter: 76 kg (167 lb 9.6 oz).    Weight management plan: Discussed healthy diet and exercise guidelines    He reports that he quit smoking about 8 years ago. His smoking use included cigarettes. He has a 40.00 pack-year smoking history. He has never used smokeless tobacco.      Appropriate preventive services were discussed with this patient, including applicable screening as appropriate for cardiovascular disease, diabetes, osteopenia/osteoporosis, and glaucoma.  As appropriate for age/gender, discussed screening for colorectal cancer, prostate cancer, breast cancer, and cervical cancer. Checklist reviewing preventive services available has been given to the patient.    Reviewed patients plan of care and provided an AVS. The Basic Care Plan (routine screening as documented in Health Maintenance) for Dustin meets the Care Plan requirement. This Care Plan has been established and reviewed with the Patient.    Counseling Resources:  ATP IV Guidelines  Pooled Cohorts Equation Calculator  Breast Cancer Risk Calculator  Breast Cancer: Medication to Reduce Risk  FRAX Risk Assessment  ICSI Preventive Guidelines  Dietary Guidelines for Americans, 2010  USDA's MyPlate  ASA Prophylaxis  Lung CA Screening    Kirill Hauser MD  [facility]    Identified Health Risks:    He is at risk for lack of exercise and has been provided with information to increase physical activity for the benefit of his well-being.  The patient was counseled and encouraged to consider " modifying their diet and eating habits. He was provided with information on recommended healthy diet options.  The patient was provided with written information regarding signs of hearing loss.  Lung Cancer Screening Shared Decision Making Visit     Dustin Shah is eligible for lung cancer screening on the basis of the information provided in my signed lung cancer screening order.     I have discussed with patient the risks and benefits of screening for lung cancer with low-dose CT.     The risks include:  radiation exposure: one low dose chest CT has as much ionizing radiation as about 15 chest x-rays or 6 months of background radiation living in [place]    false positives: 96% of positive findings/nodules are NOT cancer, but some might still require additional diagnostic evaluation, including biopsy  over-diagnosis: some slow growing cancers that might never have been clinically significant will be detected and treated unnecessarily     The benefit of early detection of lung cancer is contingent upon adherence to annual screening or more frequent follow up if indicated.     Furthermore, reaping the benefits of screening requires Dustin Shah to be willing and physically able to undergo diagnostic procedures, if indicated. Although no specific guide is available for determining severity of comorbidities, it is reasonable to withhold screening in patients who have greater mortality risk from other diseases.     We did discuss that the only way to prevent lung cancer is to not smoke. Smoking cessation counseling was not given.      I did offer risk estimation using a calculator such as this one:    ShouldIScreen

## 2022-02-23 NOTE — PATIENT INSTRUCTIONS
You will be contacted in 1-2 days for results of your lab tests.    Continue all medications unchanged.    To schedule the CT scan of the chest, call [phone].    Be consistent with low salt, low trans fat and low saturated fat diet.  Eat food rich in omega-3-fatty acids as you tolerate. (salmon, olive oil)  Eat 5 cups of vegetables, fruits and whole grains per day.  Limit starchy food (white rice, white bread, white pasta, white potatoes) to less than a cup per meal.  Minimize sweets, junk food and fastfood. Limit soda beverages to one serving per day; best to avoid it altogether though.    Exercise: moderate intensity sustained for at least 30 mins per episode, goal of 150 mins per week at least  Combine cardiovascular and resistance exercises.  These exercise recommendations are in addition to your daily activity at work or home.  Work on losing weight.    See cardiology as planned.  Pursue echocardiogram as planned.    Preventative Care Visits include: Yearly physicals, Well-child visits, Welcome to Medicare visits, & Medicare yearly wellness exams.    The purpose of these visits is to discuss your medical history and prevent health problems before you are sick.  You may need to pay a copay, coinsurance or deductible if your visit today includes testing or treating for a new or existing condition.    Additional charges may be incurred for today's visit. If you have questions about what your insurance plan covers, please contact your Insurance Company's member service department.  If you have questions specific to a bill you have already received from [facility], please contact the [facility] at [phone].     Patient Education   Personalized Prevention Plan  You are due for the preventive services outlined below.  Your care team is available to assist you in scheduling these services.  If you have already completed any of these items, please share that information with your care team to update  in your medical record.  Health Maintenance Due   Topic Date Due     ANNUAL REVIEW OF HM ORDERS  Never done     COPD Action Plan  Never done     FALL RISK ASSESSMENT  02/16/2022     LUNG CANCER SCREENING  03/01/2022       Exercise for a Healthier Heart  You may wonder how you can improve the health of your heart. If you re thinking about exercise, you re on the right track. You don t need to become an athlete. But you do need a certain amount of brisk exercise to help strengthen your heart. If you have been diagnosed with a heart condition, your healthcare provider may advise exercise to help stabilize your condition. To help make exercise a habit, choose safe, fun activities.      Exercise with a friend. When activity is fun, you're more likely to stick with it.   Before you start  Check with your healthcare provider before starting an exercise program. This is especially important if you have not been active for a while. It's also important if you have a long-term (chronic) health problem such as heart disease, diabetes, or obesity. Or if you are at high risk for having these problems.   Why exercise?  Exercising regularly offers many healthy rewards. It can help you do all of the following:     Improve your blood cholesterol level to help prevent further heart trouble    Lower your blood pressure to help prevent a stroke or heart attack    Control diabetes, or reduce your risk of getting this disease    Improve your heart and lung function    Reach and stay at a healthy weight    Make your muscles stronger so you can stay active    Prevent falls and fractures by slowing the loss of bone mass (osteoporosis)    Manage stress better    Reduce your blood pressure    Improve your sense of self and your body image  Exercise tips      Ease into your routine. Set small goals. Then build on them. If you are not sure what your activity level should be, talk with your healthcare provider first before starting an exercise  routine.    Exercise on most days. Aim for a total of 150 minutes (2 hours and 30 minutes) or more of moderate-intensity aerobic activity each week. Or 75 minutes (1 hour and 15 minutes) or more of vigorous-intensity aerobic activity each week. Or try for a combination of both. Moderate activity means that you breathe heavier and your heart rate increases but you can still talk. Think about doing 40 minutes of moderate exercise, 3 to 4 times a week. For best results, activity should last for about 40 minutes to lower blood pressure and cholesterol. It's OK to work up to the 40-minute period over time. Examples of moderate-intensity activity are walking 1 mile in 15 minutes. Or doing 30 to 45 minutes of yard work.    Step up your daily activity level.  Along with your exercise program, try being more active the whole day. Walk instead of drive. Or park further away so that you take more steps each day. Do more household tasks or yard work. You may not be able to meet the advised mount of physical activity. But doing some moderate- or vigorous-intensity aerobic activity can help reduce your risk for heart disease. Your healthcare provider can help you figure out what is best for you.    Choose 1 or more activities you enjoy.  Walking is one of the easiest things you can do. You can also try swimming, riding a bike, dancing, or taking an exercise class.    When to call your healthcare provider  Call your healthcare provider if you have any of these:     Chest pain or feel dizzy or lightheaded    Burning, tightness, pressure, or heaviness in your chest, neck, shoulders, back, or arms    Abnormal shortness of breath    More joint or muscle pain    A very fast or irregular heartbeat (palpitations)  [employer] last reviewed this educational content on 7/1/2019 2000-2021 [employer]. All rights reserved. This information is not intended as a substitute for professional medical care. Always follow your  healthcare professional's instructions.          Understanding USDA MyPlate  The USDA has guidelines to help you make healthy food choices. These are called MyPlate. MyPlate shows the food groups that make up healthy meals using the image of a place setting. Before you eat, think about the healthiest choices for what to put on your plate or in your cup or bowl. To learn more about building a healthy plate, visit www.choosemyplate.gov.    The food groups    Fruits. Any fruit or 100% fruit juice counts as part of the Fruit Group. Fruits may be fresh, canned, frozen, or dried, and may be whole, cut-up, or pureed. Make 1/2 of your plate fruits and vegetables.    Vegetables. Any vegetable or 100% vegetable juice counts as a member of the Vegetable Group. Vegetables may be fresh, frozen, canned, or dried. They can be served raw or cooked and may be whole, cut-up, or mashed. Make 1/2 of your plate fruits and vegetables.    Grains. All foods made from grains are part of the Grains Group. These include wheat, rice, oats, cornmeal, and barley. Grains are often used to make foods such as bread, pasta, oatmeal, cereal, tortillas, and grits. Grains should be no more than 1/4 of your plate. At least half of your grains should be whole grains.    Protein. This group includes meat, poultry, seafood, beans and peas, eggs, processed soy products (such as tofu), nuts (including nut butters), and seeds. Make protein choices no more than 1/4 of your plate. Meat and poultry choices should be lean or low fat.    Dairy. The Dairy Group includes all fluid milk products and foods made from milk that contain calcium, such as yogurt and cheese. (Foods that have little calcium, such as cream, butter, and cream cheese, are not part of this group.) Most dairy choices should be low-fat or fat-free.    Oils. Oils aren't a food group, but they do contain essential nutrients. However it's important to watch your intake of oils. These are fats that  are liquid at room temperature. They include canola, corn, olive, soybean, vegetable, and sunflower oil. Foods that are mainly oil include mayonnaise, certain salad dressings, and soft margarines. You likely already get your daily oil allowance from the foods you eat.  Things to limit  Eating healthy also means limiting these things in your diet:       Salt (sodium). Many processed foods have a lot of sodium. To keep sodium intake down, eat fresh vegetables, meats, poultry, and seafood when possible. Purchase low-sodium, reduced-sodium, or no-salt-added food products at the store. And don't add salt to your meals at home. Instead, season them with herbs and spices such as dill, oregano, cumin, and paprika. Or try adding flavor with lemon or lime zest and juice.    Saturated fat. Saturated fats are most often found in animal products such as beef, pork, and chicken. They are often solid at room temperature, such as butter. To reduce your saturated fat intake, choose leaner cuts of meat and poultry. And try healthier cooking methods such as grilling, broiling, roasting, or baking. For a simple lower-fat swap, use plain nonfat yogurt instead of mayonnaise when making potato salad or macaroni salad.    Added sugars. These are sugars added to foods. They are in foods such as ice cream, candy, soda, fruit drinks, sports drinks, energy drinks, cookies, pastries, jams, and syrups. Cut down on added sugars by sharing sweet treats with a family member or friend. You can also choose fruit for dessert, and drink water or other unsweetened beverages.     [employer] last reviewed this educational content on 6/1/2020 2000-2021 [employer]. All rights reserved. This information is not intended as a substitute for professional medical care. Always follow your healthcare professional's instructions.          Signs of Hearing Loss      Hearing much better with one ear can be a sign of hearing loss.   Hearing loss is a  problem shared by many people. In fact, it is one of the most common health problems, particularly as people age. Most people age 65 and older have some hearing loss. By age 80, almost everyone does. Hearing loss often occurs slowly over the years. So you may not realize your hearing has gotten worse.  Have your hearing checked  Call your healthcare provider if you:    Have to strain to hear normal conversation    Have to watch other people s faces very carefully to follow what they re saying    Need to ask people to repeat what they ve said    Often misunderstand what people are saying    Turn the volume of the television or radio up so high that others complain    Feel that people are mumbling when they re talking to you    Find that the effort to hear leaves you feeling tired and irritated    Notice, when using the phone, that you hear better with one ear than the other  [employer] last reviewed this educational content on 1/1/2020 2000-2021 [employer]. All rights reserved. This information is not intended as a substitute for professional medical care. Always follow your healthcare professional's instructions.           Lung Cancer Screening   Frequently Asked Questions  If you are at high-risk for lung cancer, getting screened with low-dose computed tomography (LDCT) every year can help save your life. This handout offers answers to some of the most common questions about lung cancer screening. If you have other questions, please call [phone] ([phone]).     What is it?  Lung cancer screening uses special X-ray technology to create an image of your lung tissue. The exam is quick and easy and takes less than 10 seconds. We don t give you any medicine or use any needles. You can eat before and after the exam. You don t need to change your clothes as long as the clothing on your chest doesn t contain metal. But, you do need to be able to hold your breath for at least 6 seconds during  the exam.    What is the goal of lung cancer screening?  The goal of lung cancer screening is to save lives. Many times, lung cancer is not found until a person starts having physical symptoms. Lung cancer screening can help detect lung cancer in the earliest stages when it may be easier to treat.    Who should be screened for lung cancer?  We suggest lung cancer screening for anyone who is at high-risk for lung cancer. You are in the high-risk group if you:      are between the ages of 55 and 79, and    have smoked at least 1 pack of cigarettes a day for 20 or more years, and    still smoke or have quit within the past 15 years.    However, if you have a new cough or shortness of breath, you should talk to your doctor before being screened.    Why does it matter if I have symptoms?  Certain symptoms can be a sign that you have a condition in your lungs that should be checked and treated by your doctor. These symptoms include fever, chest pain, a new or changing cough, shortness of breath that you have never felt before, coughing up blood or unexplained weight loss. Having any of these symptoms can greatly affect the results of lung cancer screening.       Should all smokers get an LDCT lung cancer screening exam?  It depends. Lung cancer screening is for a very specific group of men and women who have a history of heavy smoking over a long period of time (see  Who should be screened for lung cancer  above).  I am in the high-risk group, but have been diagnosed with cancer in the past. Is LDCT lung cancer screening right for me?  In some cases, you should not have LDCT lung screening, such as when your doctor is already following your cancer with CT scan studies. Your doctor will help you decide if LDCT lung screening is right for you.  Do I need to have a screening exam every year?  Yes. If you are in the high-risk group described earlier, you should get an LDCT lung cancer screening exam every year until you are  79, or are no longer willing or able to undergo screening and possible procedures to diagnose and treat lung cancer.  How effective is LDCT at preventing death from lung cancer?  Studies have shown that LDCT lung cancer screening can lower the risk of death from lung cancer by 20 percent in people who are at high-risk.  What are the risks?  There are some risks and limitations of LDCT lung cancer screening. We want to make sure you understand the risks and benefits, so please let us know if you have any questions. Your doctor may want to talk with you more about these risks.    Radiation exposure: As with any exam that uses radiation, there is a very small increased risk of cancer. The amount of radiation in LDCT is small--about the same amount a person would get from a mammogram. Your doctor orders the exam when he or she feels the potential benefits outweigh the risks.    False negatives: No test is perfect, including LDCT. It is possible that you may have a medical condition, including lung cancer, that is not found during your exam. This is called a false negative result.    False positives and more testing: LDCT very often finds something in the lung that could be cancer, but in fact is not. This is called a false positive result. False positive tests often cause anxiety. To make sure these findings are not cancer, you may need to have more tests. These tests will be done only if you give us permission. Sometimes patients need a treatment that can have side effects, such as a biopsy. For more information on false positives, see  What can I expect from the results?     Findings not related to lung cancer: Your LDCT exam also takes pictures of areas of your body next to your lungs. In a very small number of cases, the CT scan will show an abnormal finding in one of these areas, such as your kidneys, adrenal glands, liver or thyroid. This finding may not be serious, but you may need more tests. Your doctor can  help you decide what other tests you may need, if any.  What can I expect from the results?  About 1 out of 4 LDCT exams will find something that may need more tests. Most of the time, these findings are lung nodules. Lung nodules are very small collections of tissue in the lung. These nodules are very common, and the vast majority--more than 97 percent--are not cancer (benign). Most are normal lymph nodes or small areas of scarring from past infections.  But, if a small lung nodule is found to be cancer, the cancer can be cured more than 90 percent of the time. To know if the nodule is cancer, we may need to get more images before your next yearly screening exam. If the nodule has suspicious features (for example, it is large, has an odd shape or grows over time), we will refer you to a specialist for further testing.  Will my doctor also get the results?  Yes. Your doctor will get a copy of your results.

## 2022-03-08 ENCOUNTER — HOSPITAL ENCOUNTER (OUTPATIENT)
Dept: CT IMAGING | Facility: CLINIC | Age: 67
Discharge: HOME | End: 2022-03-08
Attending: FAMILY MEDICINE | Admitting: FAMILY MEDICINE
Payer: COMMERCIAL

## 2022-03-08 DIAGNOSIS — Z87.891 PERSONAL HISTORY OF TOBACCO USE: ICD-10-CM

## 2022-03-08 PROCEDURE — 71271 CT THORAX LUNG CANCER SCR C-: CPT

## 2022-03-31 ENCOUNTER — HOSPITAL ENCOUNTER (OUTPATIENT)
Dept: CARDIOLOGY | Facility: CLINIC | Age: 67
Discharge: HOME | End: 2022-03-31
Attending: INTERNAL MEDICINE | Admitting: INTERNAL MEDICINE
Payer: COMMERCIAL

## 2022-03-31 DIAGNOSIS — I77.810 ASCENDING AORTA DILATATION (H): ICD-10-CM

## 2022-03-31 LAB — LVEF ECHO: NORMAL

## 2022-03-31 PROCEDURE — 93308 TTE F-UP OR LMTD: CPT | Mod: 26 | Performed by: INTERNAL MEDICINE

## 2022-03-31 PROCEDURE — 93308 TTE F-UP OR LMTD: CPT

## 2022-03-31 PROCEDURE — 93325 DOPPLER ECHO COLOR FLOW MAPG: CPT

## 2022-03-31 PROCEDURE — 93325 DOPPLER ECHO COLOR FLOW MAPG: CPT | Mod: 26 | Performed by: INTERNAL MEDICINE

## 2022-03-31 PROCEDURE — 93321 DOPPLER ECHO F-UP/LMTD STD: CPT | Mod: 26 | Performed by: INTERNAL MEDICINE

## 2022-04-01 ENCOUNTER — OFFICE VISIT (OUTPATIENT)
Dept: CARDIOLOGY | Facility: CLINIC | Age: 67
End: 2022-04-01
Attending: INTERNAL MEDICINE
Payer: COMMERCIAL

## 2022-04-01 VITALS
WEIGHT: 178.2 LBS | DIASTOLIC BLOOD PRESSURE: 78 MMHG | TEMPERATURE: 97.6 F | BODY MASS INDEX: 29.65 KG/M2 | HEART RATE: 77 BPM | SYSTOLIC BLOOD PRESSURE: 127 MMHG

## 2022-04-01 DIAGNOSIS — I77.810 ASCENDING AORTA DILATATION (H): ICD-10-CM

## 2022-04-01 PROCEDURE — 99214 OFFICE O/P EST MOD 30 MIN: CPT | Performed by: INTERNAL MEDICINE

## 2022-04-01 NOTE — LETTER
4/1/2022    Kirill Hauser MD  [address] [facility] [address]    RE: Dustin Marcumle       Dear Colleague,     I had the pleasure of seeing Dutsin Shah in the [facility].  HPI and Plan:   Today, I had the pleasure of connecting with Dustin Shah for a follow-up visit.  He is a pleasant 6-year-old gentleman who presents the clinic for follow-up visit.  He used to smoke in the past and has emphysema.  He also has bicuspid aortic valve which is moderately stenosed and moderately dilated ascending aorta [4.1 cm].    Patient is very physically active and has never experienced any chest symptoms.  He denies chest pain, chest tightness, palpitations, orthopnea, PND, lower extremity edema, presyncope or syncope.  He does not provide a family history of bicuspid aortic valve.  He had a coronary angiogram in 2015 for chest discomfort which showed mild nonobstructive coronary artery disease.  An echocardiogram performed prior to this visit showed fairly similar aortic valve area of 1.1 cm and mean gradient of 30 mmHg.  The size of aortic root and ascending aorta are also unchanged.  I reviewed these tests today.    Assessment:  1.  Bicuspid aortic valve with moderate aortic stenosis  2.  Mild dilation of aortic root and ascending aorta  3.  No coronary artery disease on current angiogram in 2015  4.  Hyperlipidemia- mg/dL-started on Crestor 10 mg    I discussed the diagnosis of bicuspid aortic valve with the patient and that he has moderate aortic stenosis and in general is progressive at 0.1 cm/year.  At this rate, he may require valve replacement in 2-3 years..  I told him that we need to monitor him with annual echocardiograms and consider aortic valve replacement when the time comes.  Fortunately, he does not have any symptoms at this time.  I also discussed that his first-degree relatives should also be screened for bicuspid aortic valve and aortopathy.  Regarding  hyperlipidemia we discussed lifestyle modifications and that we will repeat blood work in 1 year and if LDL is persistently elevated I will consider increasing the dose of Crestor.    Plan:   1.  Follow-up with me in a year with repeat transthoracic echocardiogram  2.  Lifestyle modification with Mediterranean-style diet and regular exercise    Thank you for allowing me to participate in the care of Dustin Shah    This note was completed in part using Dragon voice recognition software. Although reviewed after completion, some word and grammatical errors may occur.    Brandan Jose MD  Cardiology    Orders Placed This Encounter   Procedures     Follow-Up with Cardiology       No orders of the defined types were placed in this encounter.      There are no discontinued medications.    Encounter Diagnosis   Name Primary?     Ascending aorta dilatation (H)        CURRENT MEDICATIONS:  Current Outpatient Medications   Medication Sig Dispense Refill     albuterol (PROAIR HFA/PROVENTIL HFA/VENTOLIN HFA) 108 (90 Base) MCG/ACT inhaler Inhale 2 puffs into the lungs every 4 hours as needed for shortness of breath / dyspnea or wheezing 18 g 11     aspirin 81 MG tablet Take by mouth daily 30 tablet      calcium carbonate-vitamin D 500-400 MG-UNIT TABS tablt Take 1 tablet by mouth daily 180 tablet 3     fluticasone-salmeterol (ADVAIR DISKUS) 250-50 MCG/DOSE inhaler Inhale 1 puff into the lungs 2 times daily 1 each 11     lisinopril (ZESTRIL) 20 MG tablet Take 1 tablet (20 mg) by mouth daily 90 tablet 3     omega 3 1000 MG CAPS Take 1 g by mouth daily 90 capsule      rosuvastatin (CRESTOR) 10 MG tablet Take 1 tablet (10 mg) by mouth daily 90 tablet 0       ALLERGIES     Allergies   Allergen Reactions     Nka [No Known Allergies]        PAST MEDICAL HISTORY:  Past Medical History:   Diagnosis Date     Bicuspid aortic valve      COPD (chronic obstructive pulmonary disease) (H) 10/2015     Hyperlipidemia 10/2015     Hypertension  10/2015       PAST SURGICAL HISTORY:  Past Surgical History:   Procedure Laterality Date     ABDOMEN SURGERY       COLONOSCOPY       COLONOSCOPY N/A 2020    Procedure: COLONOSCOPY, WITH POLYPECTOMY AND BIOPSY;  Surgeon: Emerson Meyer MD;  Location: [facility]     ENT SURGERY      Tonsels     HERNIA REPAIR       ORTHOPEDIC SURGERY  2017    broken ankle       FAMILY HISTORY:  Family History   Problem Relation Age of Onset     Diabetes Mother      Peripheral Vascular Disease Mother         mother w/ PAD     Heart Failure Mother      Heart Failure Maternal Half-Brother          of heart attack age 57     Coronary Artery Disease No family hx of        SOCIAL HISTORY:  Social History     Socioeconomic History     Marital status:      Spouse name: None     Number of children: None     Years of education: None     Highest education level: None   Occupational History     None   Tobacco Use     Smoking status: Former Smoker     Packs/day: 1.00     Years: 40.00     Pack years: 40.00     Types: Cigarettes     Quit date: 2013     Years since quittin.3     Smokeless tobacco: Never Used   Substance and Sexual Activity     Alcohol use: Yes     Comment: social     Drug use: No     Sexual activity: Not Currently   Other Topics Concern     Parent/sibling w/ CABG, MI or angioplasty before 65F 55M? Yes   Social History Narrative     None     Social Determinants of Health     Financial Resource Strain: Not on file   Food Insecurity: Not on file   Transportation Needs: Not on file   Physical Activity: Not on file   Stress: Not on file   Social Connections: Not on file   Intimate Partner Violence: Not on file   Housing Stability: Not on file       Review of Systems:  Skin:  Negative       Eyes:  Negative      ENT:  Positive for hearing loss    Respiratory:  Positive for cough;clear expectorant;dyspnea on exertion COPD   Cardiovascular:  Negative dizziness;Positive for    Gastroenterology: Negative       Genitourinary:  Negative      Musculoskeletal:  Positive for neck pain;back pain    Neurologic:  Positive for numbness or tingling of hands    Psychiatric:  Negative      Heme/Lymph/Imm:  Positive for allergies    Endocrine:  Negative        Physical Exam:  Vitals: /78 (BP Location: Right arm, Patient Position: Sitting, Cuff Size: Adult Large)   Pulse 77   Temp 97.6  F (36.4  C) (Tympanic)   Wt 80.8 kg (178 lb 3.2 oz)   BMI 29.65 kg/m    Eyes: No icterus.  Pulmonary: Chest symmetric, lungs clear bilaterally and no crackles, wheezes or rales.  Cardiovascular: RRR with normal S1 and S2, no murmur, JVP normal.  Musculoskeletal: Edema of the lower extremities: None.  Neurologic: Oriented and appropriate without obvious focal deficits.   Psychiatric: Normal affect.     Recent Lab Results:  LIPID RESULTS:  Lab Results   Component Value Date    CHOL 178 02/22/2022    CHOL 216 (H) 10/22/2020    HDL 63 02/22/2022    HDL 65 10/22/2020    LDL 89 02/22/2022     (H) 10/22/2020    TRIG 130 02/22/2022    TRIG 120 10/22/2020    CHOLHDLRATIO 3.9 10/30/2015       LIVER ENZYME RESULTS:  Lab Results   Component Value Date    AST 30 09/08/2008    ALT 24 09/08/2008       CBC RESULTS:  Lab Results   Component Value Date    WBC 9.6 11/09/2015    RBC 5.23 11/09/2015    HGB 16.2 11/09/2015    HCT 47.9 11/09/2015    MCV 92 11/09/2015    MCH 31.0 11/09/2015    MCHC 33.8 11/09/2015    RDW 13.4 11/09/2015     11/09/2015       BMP RESULTS:  Lab Results   Component Value Date     02/22/2022     10/22/2020    POTASSIUM 4.5 02/22/2022    POTASSIUM 4.4 10/22/2020    CHLORIDE 103 02/22/2022    CHLORIDE 104 10/22/2020    CO2 29 02/22/2022    CO2 27 10/22/2020    ANIONGAP 3 02/22/2022    ANIONGAP 4 10/22/2020    GLC 97 02/22/2022     (H) 10/22/2020    BUN 18 02/22/2022    BUN 15 10/22/2020    CR 0.94 02/22/2022    CR 1.02 10/22/2020    GFRESTIMATED 89 02/22/2022    GFRESTIMATED 77 10/22/2020    GFRESTBLACK 89  10/22/2020    OTTO 10.0 02/22/2022    OTTO 8.6 10/22/2020        A1C RESULTS:  Lab Results   Component Value Date    A1C 5.6 12/02/2013       INR RESULTS:  No results found for: INR    CC  Brandan Jose MD  [address]  [place] [id number]    All medical records were reviewed in detail and discussed with the patient. Greater than 30 mins were spent with the patient, 50% of this time was spent on counseling and coordination of care.  After visit summary was printed and given to the patient.    Thank you for allowing me to participate in the care of your patient.      Sincerely,     Brandan Jose MD     [facility]  cc:   Brandan Jose MD  [address]  [place] [id number]

## 2022-04-01 NOTE — NURSING NOTE
"Initial /78 (BP Location: Right arm, Patient Position: Sitting, Cuff Size: Adult Large)   Pulse 77   Temp 97.6  F (36.4  C) (Tympanic)   Wt 80.8 kg (178 lb 3.2 oz)   BMI 29.65 kg/m   Estimated body mass index is 29.65 kg/m  as calculated from the following:    Height as of 2/23/22: 1.651 m (5' 5\").    Weight as of this encounter: 80.8 kg (178 lb 3.2 oz). .    Celine Arroyo LPN on 4/1/2022 at 9:20 AM    "

## 2022-04-01 NOTE — PROGRESS NOTES
HPI and Plan:   Today, I had the pleasure of connecting with Dustin WEBER Alyssa for a follow-up visit.  He is a pleasant 6-year-old gentleman who presents the clinic for follow-up visit.  He used to smoke in the past and has emphysema.  He also has bicuspid aortic valve which is moderately stenosed and moderately dilated ascending aorta [4.1 cm].    Patient is very physically active and has never experienced any chest symptoms.  He denies chest pain, chest tightness, palpitations, orthopnea, PND, lower extremity edema, presyncope or syncope.  He does not provide a family history of bicuspid aortic valve.  He had a coronary angiogram in 2015 for chest discomfort which showed mild nonobstructive coronary artery disease.  An echocardiogram performed prior to this visit showed fairly similar aortic valve area of 1.1 cm and mean gradient of 30 mmHg.  The size of aortic root and ascending aorta are also unchanged.  I reviewed these tests today.    Assessment:  1.  Bicuspid aortic valve with moderate aortic stenosis  2.  Mild dilation of aortic root and ascending aorta  3.  No coronary artery disease on current angiogram in 2015  4.  Hyperlipidemia- mg/dL-started on Crestor 10 mg    I discussed the diagnosis of bicuspid aortic valve with the patient and that he has moderate aortic stenosis and in general is progressive at 0.1 cm/year.  At this rate, he may require valve replacement in 2-3 years..  I told him that we need to monitor him with annual echocardiograms and consider aortic valve replacement when the time comes.  Fortunately, he does not have any symptoms at this time.  I also discussed that his first-degree relatives should also be screened for bicuspid aortic valve and aortopathy.  Regarding hyperlipidemia we discussed lifestyle modifications and that we will repeat blood work in 1 year and if LDL is persistently elevated I will consider increasing the dose of Crestor.    Plan:   1.  Follow-up with me in a year  with repeat transthoracic echocardiogram  2.  Lifestyle modification with Mediterranean-style diet and regular exercise    Thank you for allowing me to participate in the care of Dustin Shah    This note was completed in part using Dragon voice recognition software. Although reviewed after completion, some word and grammatical errors may occur.    Brandan Jose MD  Cardiology    Orders Placed This Encounter   Procedures     Follow-Up with Cardiology       No orders of the defined types were placed in this encounter.      There are no discontinued medications.    Encounter Diagnosis   Name Primary?     Ascending aorta dilatation (H)        CURRENT MEDICATIONS:  Current Outpatient Medications   Medication Sig Dispense Refill     albuterol (PROAIR HFA/PROVENTIL HFA/VENTOLIN HFA) 108 (90 Base) MCG/ACT inhaler Inhale 2 puffs into the lungs every 4 hours as needed for shortness of breath / dyspnea or wheezing 18 g 11     aspirin 81 MG tablet Take by mouth daily 30 tablet      calcium carbonate-vitamin D 500-400 MG-UNIT TABS tablt Take 1 tablet by mouth daily 180 tablet 3     fluticasone-salmeterol (ADVAIR DISKUS) 250-50 MCG/DOSE inhaler Inhale 1 puff into the lungs 2 times daily 1 each 11     lisinopril (ZESTRIL) 20 MG tablet Take 1 tablet (20 mg) by mouth daily 90 tablet 3     omega 3 1000 MG CAPS Take 1 g by mouth daily 90 capsule      rosuvastatin (CRESTOR) 10 MG tablet Take 1 tablet (10 mg) by mouth daily 90 tablet 0       ALLERGIES     Allergies   Allergen Reactions     Nka [No Known Allergies]        PAST MEDICAL HISTORY:  Past Medical History:   Diagnosis Date     Bicuspid aortic valve      COPD (chronic obstructive pulmonary disease) (H) 10/2015     Hyperlipidemia 10/2015     Hypertension 10/2015       PAST SURGICAL HISTORY:  Past Surgical History:   Procedure Laterality Date     ABDOMEN SURGERY       COLONOSCOPY       COLONOSCOPY N/A 2/14/2020    Procedure: COLONOSCOPY, WITH POLYPECTOMY AND BIOPSY;  Surgeon:  Emerson Meyer MD;  Location: [facility]     ENT SURGERY      Tonsels     HERNIA REPAIR       ORTHOPEDIC SURGERY  2017    broken ankle       FAMILY HISTORY:  Family History   Problem Relation Age of Onset     Diabetes Mother      Peripheral Vascular Disease Mother         mother w/ PAD     Heart Failure Mother      Heart Failure Maternal Half-Brother          of heart attack age 57     Coronary Artery Disease No family hx of        SOCIAL HISTORY:  Social History     Socioeconomic History     Marital status:      Spouse name: None     Number of children: None     Years of education: None     Highest education level: None   Occupational History     None   Tobacco Use     Smoking status: Former Smoker     Packs/day: 1.00     Years: 40.00     Pack years: 40.00     Types: Cigarettes     Quit date: 2013     Years since quittin.3     Smokeless tobacco: Never Used   Substance and Sexual Activity     Alcohol use: Yes     Comment: social     Drug use: No     Sexual activity: Not Currently   Other Topics Concern     Parent/sibling w/ CABG, MI or angioplasty before 65F 55M? Yes   Social History Narrative     None     Social Determinants of Health     Financial Resource Strain: Not on file   Food Insecurity: Not on file   Transportation Needs: Not on file   Physical Activity: Not on file   Stress: Not on file   Social Connections: Not on file   Intimate Partner Violence: Not on file   Housing Stability: Not on file       Review of Systems:  Skin:  Negative       Eyes:  Negative      ENT:  Positive for hearing loss    Respiratory:  Positive for cough;clear expectorant;dyspnea on exertion COPD   Cardiovascular:  Negative dizziness;Positive for    Gastroenterology: Negative      Genitourinary:  Negative      Musculoskeletal:  Positive for neck pain;back pain    Neurologic:  Positive for numbness or tingling of hands    Psychiatric:  Negative      Heme/Lymph/Imm:  Positive for allergies    Endocrine:  Negative         Physical Exam:  Vitals: /78 (BP Location: Right arm, Patient Position: Sitting, Cuff Size: Adult Large)   Pulse 77   Temp 97.6  F (36.4  C) (Tympanic)   Wt 80.8 kg (178 lb 3.2 oz)   BMI 29.65 kg/m    Eyes: No icterus.  Pulmonary: Chest symmetric, lungs clear bilaterally and no crackles, wheezes or rales.  Cardiovascular: RRR with normal S1 and S2, no murmur, JVP normal.  Musculoskeletal: Edema of the lower extremities: None.  Neurologic: Oriented and appropriate without obvious focal deficits.   Psychiatric: Normal affect.     Recent Lab Results:  LIPID RESULTS:  Lab Results   Component Value Date    CHOL 178 02/22/2022    CHOL 216 (H) 10/22/2020    HDL 63 02/22/2022    HDL 65 10/22/2020    LDL 89 02/22/2022     (H) 10/22/2020    TRIG 130 02/22/2022    TRIG 120 10/22/2020    CHOLHDLRATIO 3.9 10/30/2015       LIVER ENZYME RESULTS:  Lab Results   Component Value Date    AST 30 09/08/2008    ALT 24 09/08/2008       CBC RESULTS:  Lab Results   Component Value Date    WBC 9.6 11/09/2015    RBC 5.23 11/09/2015    HGB 16.2 11/09/2015    HCT 47.9 11/09/2015    MCV 92 11/09/2015    MCH 31.0 11/09/2015    MCHC 33.8 11/09/2015    RDW 13.4 11/09/2015     11/09/2015       BMP RESULTS:  Lab Results   Component Value Date     02/22/2022     10/22/2020    POTASSIUM 4.5 02/22/2022    POTASSIUM 4.4 10/22/2020    CHLORIDE 103 02/22/2022    CHLORIDE 104 10/22/2020    CO2 29 02/22/2022    CO2 27 10/22/2020    ANIONGAP 3 02/22/2022    ANIONGAP 4 10/22/2020    GLC 97 02/22/2022     (H) 10/22/2020    BUN 18 02/22/2022    BUN 15 10/22/2020    CR 0.94 02/22/2022    CR 1.02 10/22/2020    GFRESTIMATED 89 02/22/2022    GFRESTIMATED 77 10/22/2020    GFRESTBLACK 89 10/22/2020    OTTO 10.0 02/22/2022    OTTO 8.6 10/22/2020        A1C RESULTS:  Lab Results   Component Value Date    A1C 5.6 12/02/2013       INR RESULTS:  No results found for: INR    CC  Brandan Jose MD  [address]  [address]  [place] [address]    All medical records were reviewed in detail and discussed with the patient. Greater than 30 mins were spent with the patient, 50% of this time was spent on counseling and coordination of care.  After visit summary was printed and given to the patient.

## 2022-05-02 DIAGNOSIS — E78.2 MIXED HYPERLIPIDEMIA: ICD-10-CM

## 2022-05-03 RX ORDER — ROSUVASTATIN CALCIUM 10 MG/1
TABLET, COATED ORAL
Qty: 90 TABLET | Refills: 0 | Status: SHIPPED | OUTPATIENT
Start: 2022-05-03 | End: 2022-08-03

## 2022-11-19 ENCOUNTER — HEALTH MAINTENANCE LETTER (OUTPATIENT)
Age: 67
End: 2022-11-19

## 2023-03-02 DIAGNOSIS — J44.9 COPD, MODERATE (H): ICD-10-CM

## 2023-03-05 DIAGNOSIS — I10 BENIGN ESSENTIAL HYPERTENSION: ICD-10-CM

## 2023-03-06 RX ORDER — FLUTICASONE PROPIONATE AND SALMETEROL 250; 50 UG/1; UG/1
POWDER RESPIRATORY (INHALATION)
Qty: 1 EACH | Refills: 11 | Status: SHIPPED | OUTPATIENT
Start: 2023-03-06 | End: 2024-03-11

## 2023-03-07 RX ORDER — LISINOPRIL 20 MG/1
TABLET ORAL
Qty: 90 TABLET | Refills: 3 | Status: SHIPPED | OUTPATIENT
Start: 2023-03-07 | End: 2024-03-05

## 2023-03-07 NOTE — TELEPHONE ENCOUNTER
"Pending Prescriptions:                       Disp   Refills    lisinopril (ZESTRIL) 20 MG tablet [Pharmac*90 tab*3        Sig: TAKE 1 TABLET BY MOUTH ONCE DAILY     Routing refill request to provider for review/approval because     Requested Prescriptions   Pending Prescriptions Disp Refills    lisinopril (ZESTRIL) 20 MG tablet [Pharmacy Med Name: lisinopril 20 mg tablet] 90 tablet 3     Sig: TAKE 1 TABLET BY MOUTH ONCE DAILY       ACE Inhibitors (Including Combos) Protocol Failed - 3/5/2023  8:04 AM        Failed - Recent (12 mo) or future (30 days) visit within the authorizing provider's specialty     Patient has had an office visit with the authorizing provider or a provider within the authorizing providers department within the previous 12 mos or has a future within next 30 days. See \"Patient Info\" tab in inbasket, or \"Choose Columns\" in Meds & Orders section of the refill encounter.              Failed - Normal serum creatinine on file in past 12 months     Recent Labs   Lab Test 02/22/22  0825   CR 0.94       Ok to refill medication if creatinine is low          Failed - Normal serum potassium on file in past 12 months     Recent Labs   Lab Test 02/22/22  0825   POTASSIUM 4.5             Passed - Blood pressure under 140/90 in past 12 months     BP Readings from Last 3 Encounters:   04/01/22 127/78   02/23/22 110/80   04/08/21 124/85                 Passed - Medication is active on med list        Passed - Patient is age 18 or older            "

## 2023-03-18 ASSESSMENT — ENCOUNTER SYMPTOMS
HEMATOCHEZIA: 0
JOINT SWELLING: 0
FREQUENCY: 0
HEARTBURN: 1
EYE PAIN: 0
NAUSEA: 0
PALPITATIONS: 0
MYALGIAS: 0
DIZZINESS: 0
DIARRHEA: 0
ARTHRALGIAS: 0
SHORTNESS OF BREATH: 0
FEVER: 0
CONSTIPATION: 0
WEAKNESS: 0
CHILLS: 0
COUGH: 1
HEMATURIA: 0
SORE THROAT: 0
NERVOUS/ANXIOUS: 0
HEADACHES: 0
ABDOMINAL PAIN: 0
PARESTHESIAS: 0
DYSURIA: 0

## 2023-03-18 ASSESSMENT — ACTIVITIES OF DAILY LIVING (ADL): CURRENT_FUNCTION: NO ASSISTANCE NEEDED

## 2023-03-22 ENCOUNTER — OFFICE VISIT (OUTPATIENT)
Dept: FAMILY MEDICINE | Facility: CLINIC | Age: 68
End: 2023-03-22
Payer: COMMERCIAL

## 2023-03-22 VITALS
BODY MASS INDEX: 28.62 KG/M2 | OXYGEN SATURATION: 95 % | DIASTOLIC BLOOD PRESSURE: 76 MMHG | TEMPERATURE: 97.9 F | HEIGHT: 65 IN | HEART RATE: 71 BPM | SYSTOLIC BLOOD PRESSURE: 122 MMHG | WEIGHT: 171.8 LBS | RESPIRATION RATE: 12 BRPM

## 2023-03-22 DIAGNOSIS — Z12.5 SCREENING FOR PROSTATE CANCER: ICD-10-CM

## 2023-03-22 DIAGNOSIS — I77.810 ASCENDING AORTA DILATATION (H): ICD-10-CM

## 2023-03-22 DIAGNOSIS — Z23 NEED FOR VACCINATION: ICD-10-CM

## 2023-03-22 DIAGNOSIS — J44.9 COPD, MODERATE (H): ICD-10-CM

## 2023-03-22 DIAGNOSIS — E78.2 MIXED HYPERLIPIDEMIA: ICD-10-CM

## 2023-03-22 DIAGNOSIS — Z12.11 SCREEN FOR COLON CANCER: ICD-10-CM

## 2023-03-22 DIAGNOSIS — Z87.891 PERSONAL HISTORY OF TOBACCO USE: ICD-10-CM

## 2023-03-22 DIAGNOSIS — Z00.00 ENCOUNTER FOR MEDICARE ANNUAL WELLNESS EXAM: Primary | ICD-10-CM

## 2023-03-22 DIAGNOSIS — I10 BENIGN ESSENTIAL HYPERTENSION: ICD-10-CM

## 2023-03-22 PROCEDURE — 91313 COVID-19 VACCINE BIVALENT BOOSTER 18+ (MODERNA): CPT | Performed by: FAMILY MEDICINE

## 2023-03-22 PROCEDURE — 99213 OFFICE O/P EST LOW 20 MIN: CPT | Mod: 25 | Performed by: FAMILY MEDICINE

## 2023-03-22 PROCEDURE — 90662 IIV NO PRSV INCREASED AG IM: CPT | Performed by: FAMILY MEDICINE

## 2023-03-22 PROCEDURE — 90472 IMMUNIZATION ADMIN EACH ADD: CPT | Performed by: FAMILY MEDICINE

## 2023-03-22 PROCEDURE — 90677 PCV20 VACCINE IM: CPT | Performed by: FAMILY MEDICINE

## 2023-03-22 PROCEDURE — 90471 IMMUNIZATION ADMIN: CPT | Performed by: FAMILY MEDICINE

## 2023-03-22 PROCEDURE — G0296 VISIT TO DETERM LDCT ELIG: HCPCS | Performed by: FAMILY MEDICINE

## 2023-03-22 PROCEDURE — 99397 PER PM REEVAL EST PAT 65+ YR: CPT | Mod: 25 | Performed by: FAMILY MEDICINE

## 2023-03-22 PROCEDURE — 0134A COVID-19 VACCINE BIVALENT BOOSTER 18+ (MODERNA): CPT | Performed by: FAMILY MEDICINE

## 2023-03-22 ASSESSMENT — ENCOUNTER SYMPTOMS
HEMATURIA: 0
HEMATOCHEZIA: 0
JOINT SWELLING: 0
CHILLS: 0
DYSURIA: 0
PALPITATIONS: 0
HEARTBURN: 1
FREQUENCY: 0
EYE PAIN: 0
MYALGIAS: 0
HEADACHES: 0
PARESTHESIAS: 0
ARTHRALGIAS: 0
ABDOMINAL PAIN: 0
COUGH: 1
CONSTIPATION: 0
NERVOUS/ANXIOUS: 0
WEAKNESS: 0
SORE THROAT: 0
SHORTNESS OF BREATH: 0
DIARRHEA: 0
DIZZINESS: 0
FEVER: 0
NAUSEA: 0

## 2023-03-22 ASSESSMENT — PAIN SCALES - GENERAL: PAINLEVEL: NO PAIN (0)

## 2023-03-22 ASSESSMENT — ACTIVITIES OF DAILY LIVING (ADL): CURRENT_FUNCTION: NO ASSISTANCE NEEDED

## 2023-03-22 NOTE — PATIENT INSTRUCTIONS
Patient Education   Personalized Prevention Plan  You are due for the preventive services outlined below.  Your care team is available to assist you in scheduling these services.  If you have already completed any of these items, please share that information with your care team to update in your medical record.  Health Maintenance Due   Topic Date Due     COPD Action Plan  Never done     Pneumococcal Vaccine (2 - PCV) 02/16/2022     COVID-19 Vaccine (4 - Booster for Moderna series) 03/14/2022     Flu Vaccine (1) 09/01/2022     ANNUAL REVIEW OF HM ORDERS  02/23/2023     LUNG CANCER SCREENING  03/08/2023     Colorectal Cancer Screening  02/14/2023     Annual Wellness Visit  02/23/2023       Exercise for a Healthier Heart  You may wonder how you can improve the health of your heart. If you re thinking about exercise, you re on the right track. You don t need to become an athlete. But you do need a certain amount of brisk exercise to help strengthen your heart. If you have been diagnosed with a heart condition, your healthcare provider may advise exercise to help your condition. To help make exercise a habit, choose safe, fun activities.      Exercise with a friend. When activity is fun, you're more likely to stick with it.     Before you start  Check with your healthcare provider before starting an exercise program. This is especially important if you haven't been active for a while. It's also important if you have a long-term (chronic) health problem such as heart disease, diabetes, or obesity. Also check with your provider if you're at high risk for having these problems.   Why exercise?  Exercising regularly offers many healthy rewards. It can help you do all of these:     Improve your blood cholesterol level to help prevent further heart trouble.    Lower your blood pressure to help prevent a stroke or heart attack.    Control diabetes or reduce your risk of getting this disease.    Improve your heart and lung  function.    Reach and stay at a healthy weight.    Make your muscles stronger so you can stay active.    Prevent falls and fractures by slowing the loss of bone mass (osteoporosis).    Manage stress better.    Improve your sense of self and your body image.  Exercise tips      Ease into your routine. Set small goals. Then build on them. Talk with your healthcare provider first before starting an exercise routine if you're not sure what your activity level should be.    Exercise on most days. Aim for a total of at least 150 minutes (2 hours and 30 minutes) or more of moderate-intensity aerobic activity each week. You could also do 75 minutes (1 hour and 15 minutes) or more of vigorous-intensity aerobic activity each week. Or try for a combination of both. Moderate activity means that you breathe heavier and your heart rate increases, but you can still talk. Think about doing at least 30 minutes of moderate exercise, 5 times a week. It's OK to work up to the 30-minute period over time. Examples of moderate-intensity activity are brisk walking, gardening, and water aerobics.    Step up your daily activity level.  Along with your exercise program, try being more active the whole day. Walk instead of drive. Or park further away so that you take more steps each day. Do more household tasks or yard work. You may not be able to meet the advised amount of physical activity. But doing some moderate- or vigorous-intensity aerobic activity can help reduce your risk for heart disease. Your healthcare provider can help you figure out what is best for you.    Choose 1 or more activities you enjoy.  Walking is one of the easiest things you can do. You can also try swimming, riding a bike, dancing, or taking an exercise class.    Call 911  Call 911 right away if any of these occur:     Chest pain that doesn't go away quickly with rest    New burning, tightness, pressure, or heaviness in your chest, neck, shoulders, back, or  arms    Abnormal or severe shortness of breath    A very fast or irregular heartbeat (palpitations)    Fainting  When to call your healthcare provider  Call your healthcare provider if you have any of these:     Dizziness or lightheadedness    Mild shortness of breath or chest pain    Increased or new joint or muscle pain    [employer] last reviewed this educational content on 7/1/2022 2000-2022 [employer]. All rights reserved. This information is not intended as a substitute for professional medical care. Always follow your healthcare professional's instructions.          Understanding USDA MyPlate  The USDA has guidelines to help you make healthy food choices. These are called MyPlate. MyPlate shows the food groups that make up healthy meals using the image of a place setting. Before you eat, think about the healthiest choices for what to put on your plate or in your cup or bowl. To learn more about building a healthy plate, visit www.choosemyplate.gov.     The food groups    Fruits. Any fruit or 100% fruit juice counts as part of the Fruit Group. Fruits may be fresh, canned, frozen, or dried, and may be whole, cut-up, or pureed. Make 1/2 of your plate fruits and vegetables.    Vegetables. Any vegetable or 100% vegetable juice counts as a member of the Vegetable Group. Vegetables may be fresh, frozen, canned, or dried. They can be served raw or cooked and may be whole, cut-up, or mashed. Make 1/2 of your plate fruits and vegetables.    Grains. All foods made from grains are part of the Grains Group. These include wheat, rice, oats, cornmeal, and barley. Grains are often used to make foods such as bread, pasta, oatmeal, cereal, tortillas, and grits. Grains should be no more than 1/4 of your plate. At least half of your grains should be whole grains.    Protein. This group includes meat, poultry, seafood, beans and peas, eggs, processed soy products (such as tofu), nuts (including nut butters), and  seeds. Make protein choices no more than 1/4 of your plate. Meat and poultry choices should be lean or low fat.    Dairy. The Dairy Group includes all fluid milk products and foods made from milk that contain calcium, such as yogurt and cheese. (Foods that have little calcium, such as cream, butter, and cream cheese, are not part of this group.) Most dairy choices should be low-fat or fat-free.    Oils. Oils aren't a food group, but they do contain essential nutrients. However it's important to watch your intake of oils. These are fats that are liquid at room temperature. They include canola, corn, olive, soybean, vegetable, and sunflower oil. Foods that are mainly oil include mayonnaise, certain salad dressings, and soft margarines. You likely already get your daily oil allowance from the foods you eat.  Things to limit  Eating healthy also means limiting these things in your diet:    Salt (sodium). Many processed foods have a lot of sodium. To keep sodium intake down, eat fresh vegetables, meats, poultry, and seafood when possible. Purchase low-sodium, reduced-sodium, or no-salt-added food products at the store. And don't add salt to your meals at home. Instead, season them with herbs and spices such as dill, oregano, cumin, and paprika. Or try adding flavor with lemon or lime zest and juice.    Saturated fat. Saturated fats are most often found in animal products such as beef, pork, and chicken. They are often solid at room temperature, such as butter. To reduce your saturated fat intake, choose leaner cuts of meat and poultry. And try healthier cooking methods such as grilling, broiling, roasting, or baking. For a simple lower-fat swap, use plain nonfat yogurt instead of mayonnaise when making potato salad or macaroni salad.    Added sugars. These are sugars added to foods. They are in foods such as ice cream, candy, soda, fruit drinks, sports drinks, energy drinks, cookies, pastries, jams, and syrups. Cut down  on added sugars by sharing sweet treats with a family member or friend. You can also choose fruit for dessert, and drink water or other unsweetened beverages.  [employer] last reviewed this educational content on 6/1/2020 2000-2022 [employer]. All rights reserved. This information is not intended as a substitute for professional medical care. Always follow your healthcare professional's instructions.          Signs of Hearing Loss  Hearing loss is a problem shared by many people. In fact, it's one of the most common health problems, particularly as people age. Most people aged 65 and older have some hearing loss. By age 80, almost everyone does. Hearing loss often occurs slowly over the years. So, you may not realize your hearing has gotten worse.   When sudden hearing loss occurs, it's important to contact your healthcare provider right away. Your provider will do a medical exam and a hearing exam as soon as possible. This is to help find the cause and type of your sudden hearing loss. Based on your diagnosis, your healthcare provider will discuss possible treatments.      Hearing much better with one ear can be a sign of hearing loss.     Have your hearing checked  Call your healthcare provider if you:     Have to strain to hear normal conversation    Have to watch other people s faces very carefully to follow what they re saying    Need to ask people to repeat what they ve said    Often misunderstand what people are saying    Turn the volume of the television or radio up so high that others complain    Feel that people are mumbling when they re talking to you    Find that the effort to hear leaves you feeling tired and irritated    Notice, when using the phone, that you hear better with one ear than the other  [employer] last reviewed this educational content on 6/1/2022 2000-2022 [employer]. All rights reserved. This information is not intended as a substitute for professional  medical care. Always follow your healthcare professional's instructions.           Patient Education   Personalized Prevention Plan  You are due for the preventive services outlined below.  Your care team is available to assist you in scheduling these services.  If you have already completed any of these items, please share that information with your care team to update in your medical record.  Health Maintenance Due   Topic Date Due     COPD Action Plan  Never done     Pneumococcal Vaccine (2 - PCV) 02/16/2022     COVID-19 Vaccine (4 - Booster for Moderna series) 03/14/2022     Flu Vaccine (1) 09/01/2022     ANNUAL REVIEW OF HM ORDERS  02/23/2023     LUNG CANCER SCREENING  03/08/2023     Preventive Health Recommendations  See your health care provider every year to    Review health changes.     Discuss preventive care.      Review your medicines if your doctor has prescribed any.    Talk with your health care provider about whether you should have a test to screen for prostate cancer (PSA).    Every 3 years, have a diabetes test (fasting glucose). If you are at risk for diabetes, you should have this test more often.    Every 5 years, have a cholesterol test. Have this test more often if you are at risk for high cholesterol or heart disease.     Every 10 years, have a colonoscopy. Or, have a yearly FIT test (stool test). These exams will check for colon cancer.    Talk to with your health care provider about screening for Abdominal Aortic Aneurysm if you have a family history of AAA or have a history of smoking.    Shots:     Get a flu shot each year.     Get a tetanus shot every 10 years.     Talk to your doctor about your pneumonia vaccines. There are now two you should receive - Pneumovax (PPSV 23) and Prevnar (PCV 13).    Talk to your pharmacist about a shingles vaccine.     Talk to your doctor about the hepatitis B vaccine.    Nutrition:     Eat at least 5 servings of fruits and vegetables each day.     Eat  whole-grain bread, whole-wheat pasta and brown rice instead of white grains and rice.     Get adequate Calcium and Vitamin D.     Lifestyle    Exercise for at least 150 minutes a week (30 minutes a day, 5 days a week). This will help you control your weight and prevent disease.     Limit alcohol to one drink per day.     No smoking.     Wear sunscreen to prevent skin cancer.     See your dentist every six months for an exam and cleaning.     See your eye doctor every 1 to 2 years to screen for conditions such as glaucoma, macular degeneration and cataracts.    Personalized Prevention Plan  You are due for the preventive services outlined below.  Your care team is available to assist you in scheduling these services.  If you have already completed any of these items, please share that information with your care team to update in your medical record.  Health Maintenance   Topic Date Due     COPD ACTION PLAN  Never done     Pneumococcal Vaccine: 65+ Years (2 - PCV) 02/16/2022     COVID-19 Vaccine (4 - Booster for Moderna series) 03/14/2022     INFLUENZA VACCINE (1) 09/01/2022     ANNUAL REVIEW OF HM ORDERS  02/23/2023     LUNG CANCER SCREENING  03/08/2023     MEDICARE ANNUAL WELLNESS VISIT  03/22/2024     FALL RISK ASSESSMENT  03/22/2024     LIPID  02/22/2027     ADVANCE CARE PLANNING  02/23/2027     DTAP/TDAP/TD IMMUNIZATION (3 - Td or Tdap) 12/27/2028     COLORECTAL CANCER SCREENING  02/14/2030     SPIROMETRY  Completed     HEPATITIS C SCREENING  Completed     PHQ-2 (once per calendar year)  Completed     ZOSTER IMMUNIZATION  Completed     AORTIC ANEURYSM SCREENING (SYSTEM ASSIGNED)  Completed     IPV IMMUNIZATION  Aged Out     MENINGITIS IMMUNIZATION  Aged Out       Exercise for a Healthier Heart  You may wonder how you can improve the health of your heart. If you re thinking about exercise, you re on the right track. You don t need to become an athlete. But you do need a certain amount of brisk exercise to help  strengthen your heart. If you have been diagnosed with a heart condition, your healthcare provider may advise exercise to help your condition. To help make exercise a habit, choose safe, fun activities.      Exercise with a friend. When activity is fun, you're more likely to stick with it.     Before you start  Check with your healthcare provider before starting an exercise program. This is especially important if you haven't been active for a while. It's also important if you have a long-term (chronic) health problem such as heart disease, diabetes, or obesity. Also check with your provider if you're at high risk for having these problems.   Why exercise?  Exercising regularly offers many healthy rewards. It can help you do all of these:     Improve your blood cholesterol level to help prevent further heart trouble.    Lower your blood pressure to help prevent a stroke or heart attack.    Control diabetes or reduce your risk of getting this disease.    Improve your heart and lung function.    Reach and stay at a healthy weight.    Make your muscles stronger so you can stay active.    Prevent falls and fractures by slowing the loss of bone mass (osteoporosis).    Manage stress better.    Improve your sense of self and your body image.  Exercise tips      Ease into your routine. Set small goals. Then build on them. Talk with your healthcare provider first before starting an exercise routine if you're not sure what your activity level should be.    Exercise on most days. Aim for a total of at least 150 minutes (2 hours and 30 minutes) or more of moderate-intensity aerobic activity each week. You could also do 75 minutes (1 hour and 15 minutes) or more of vigorous-intensity aerobic activity each week. Or try for a combination of both. Moderate activity means that you breathe heavier and your heart rate increases, but you can still talk. Think about doing at least 30 minutes of moderate exercise, 5 times a week. It's OK  to work up to the 30-minute period over time. Examples of moderate-intensity activity are brisk walking, gardening, and water aerobics.    Step up your daily activity level.  Along with your exercise program, try being more active the whole day. Walk instead of drive. Or park further away so that you take more steps each day. Do more household tasks or yard work. You may not be able to meet the advised amount of physical activity. But doing some moderate- or vigorous-intensity aerobic activity can help reduce your risk for heart disease. Your healthcare provider can help you figure out what is best for you.    Choose 1 or more activities you enjoy.  Walking is one of the easiest things you can do. You can also try swimming, riding a bike, dancing, or taking an exercise class.    Call 911  Call 911 right away if any of these occur:     Chest pain that doesn't go away quickly with rest    New burning, tightness, pressure, or heaviness in your chest, neck, shoulders, back, or arms    Abnormal or severe shortness of breath    A very fast or irregular heartbeat (palpitations)    Fainting  When to call your healthcare provider  Call your healthcare provider if you have any of these:     Dizziness or lightheadedness    Mild shortness of breath or chest pain    Increased or new joint or muscle pain    [email or web address] last reviewed this educational content on 7/1/2022 2000-2022 [employer]. All rights reserved. This information is not intended as a substitute for professional medical care. Always follow your healthcare professional's instructions.          Understanding USDA MyPlate  The USDA has guidelines to help you make healthy food choices. These are called MyPlate. MyPlate shows the food groups that make up healthy meals using the image of a place setting. Before you eat, think about the healthiest choices for what to put on your plate or in your cup or bowl. To learn more about building a healthy plate,  visit www.choosemyplate.gov.     The food groups    Fruits. Any fruit or 100% fruit juice counts as part of the Fruit Group. Fruits may be fresh, canned, frozen, or dried, and may be whole, cut-up, or pureed. Make 1/2 of your plate fruits and vegetables.    Vegetables. Any vegetable or 100% vegetable juice counts as a member of the Vegetable Group. Vegetables may be fresh, frozen, canned, or dried. They can be served raw or cooked and may be whole, cut-up, or mashed. Make 1/2 of your plate fruits and vegetables.    Grains. All foods made from grains are part of the Grains Group. These include wheat, rice, oats, cornmeal, and barley. Grains are often used to make foods such as bread, pasta, oatmeal, cereal, tortillas, and grits. Grains should be no more than 1/4 of your plate. At least half of your grains should be whole grains.    Protein. This group includes meat, poultry, seafood, beans and peas, eggs, processed soy products (such as tofu), nuts (including nut butters), and seeds. Make protein choices no more than 1/4 of your plate. Meat and poultry choices should be lean or low fat.    Dairy. The Dairy Group includes all fluid milk products and foods made from milk that contain calcium, such as yogurt and cheese. (Foods that have little calcium, such as cream, butter, and cream cheese, are not part of this group.) Most dairy choices should be low-fat or fat-free.    Oils. Oils aren't a food group, but they do contain essential nutrients. However it's important to watch your intake of oils. These are fats that are liquid at room temperature. They include canola, corn, olive, soybean, vegetable, and sunflower oil. Foods that are mainly oil include mayonnaise, certain salad dressings, and soft margarines. You likely already get your daily oil allowance from the foods you eat.  Things to limit  Eating healthy also means limiting these things in your diet:    Salt (sodium). Many processed foods have a lot of sodium.  To keep sodium intake down, eat fresh vegetables, meats, poultry, and seafood when possible. Purchase low-sodium, reduced-sodium, or no-salt-added food products at the store. And don't add salt to your meals at home. Instead, season them with herbs and spices such as dill, oregano, cumin, and paprika. Or try adding flavor with lemon or lime zest and juice.    Saturated fat. Saturated fats are most often found in animal products such as beef, pork, and chicken. They are often solid at room temperature, such as butter. To reduce your saturated fat intake, choose leaner cuts of meat and poultry. And try healthier cooking methods such as grilling, broiling, roasting, or baking. For a simple lower-fat swap, use plain nonfat yogurt instead of mayonnaise when making potato salad or macaroni salad.    Added sugars. These are sugars added to foods. They are in foods such as ice cream, candy, soda, fruit drinks, sports drinks, energy drinks, cookies, pastries, jams, and syrups. Cut down on added sugars by sharing sweet treats with a family member or friend. You can also choose fruit for dessert, and drink water or other unsweetened beverages.  [email or web address] last reviewed this educational content on 6/1/2020 2000-2022 [employer]. All rights reserved. This information is not intended as a substitute for professional medical care. Always follow your healthcare professional's instructions.          Signs of Hearing Loss  Hearing loss is a problem shared by many people. In fact, it's one of the most common health problems, particularly as people age. Most people aged 65 and older have some hearing loss. By age 80, almost everyone does. Hearing loss often occurs slowly over the years. So, you may not realize your hearing has gotten worse.   When sudden hearing loss occurs, it's important to contact your healthcare provider right away. Your provider will do a medical exam and a hearing exam as soon as possible. This is  to help find the cause and type of your sudden hearing loss. Based on your diagnosis, your healthcare provider will discuss possible treatments.      Hearing much better with one ear can be a sign of hearing loss.     Have your hearing checked  Call your healthcare provider if you:     Have to strain to hear normal conversation    Have to watch other people s faces very carefully to follow what they re saying    Need to ask people to repeat what they ve said    Often misunderstand what people are saying    Turn the volume of the television or radio up so high that others complain    Feel that people are mumbling when they re talking to you    Find that the effort to hear leaves you feeling tired and irritated    Notice, when using the phone, that you hear better with one ear than the other  [employer] last reviewed this educational content on 6/1/2022 2000-2022 [employer]. All rights reserved. This information is not intended as a substitute for professional medical care. Always follow your healthcare professional's instructions.           Lung Cancer Screening   Frequently Asked Questions  If you are at high-risk for lung cancer, getting screened with low-dose computed tomography (LDCT) every year can help save your life. This handout offers answers to some of the most common questions about lung cancer screening. If you have other questions, please call [phone] ([phone]).     What is it?  Lung cancer screening uses special X-ray technology to create an image of your lung tissue. The exam is quick and easy and takes less than 10 seconds. We don t give you any medicine or use any needles. You can eat before and after the exam. You don t need to change your clothes as long as the clothing on your chest doesn t contain metal. But, you do need to be able to hold your breath for at least 6 seconds during the exam.    What is the goal of lung cancer screening?  The goal of lung cancer  screening is to save lives. Many times, lung cancer is not found until a person starts having physical symptoms. Lung cancer screening can help detect lung cancer in the earliest stages when it may be easier to treat.    Who should be screened for lung cancer?  We suggest lung cancer screening for anyone who is at high-risk for lung cancer. You are in the high-risk group if you:      are between the ages of 55 and 79, and    have smoked at least 1 pack of cigarettes a day for 20 or more years, and    still smoke or have quit within the past 15 years.    However, if you have a new cough or shortness of breath, you should talk to your doctor before being screened.    Why does it matter if I have symptoms?  Certain symptoms can be a sign that you have a condition in your lungs that should be checked and treated by your doctor. These symptoms include fever, chest pain, a new or changing cough, shortness of breath that you have never felt before, coughing up blood or unexplained weight loss. Having any of these symptoms can greatly affect the results of lung cancer screening.       Should all smokers get an LDCT lung cancer screening exam?  It depends. Lung cancer screening is for a very specific group of men and women who have a history of heavy smoking over a long period of time (see  Who should be screened for lung cancer  above).  I am in the high-risk group, but have been diagnosed with cancer in the past. Is LDCT lung cancer screening right for me?  In some cases, you should not have LDCT lung screening, such as when your doctor is already following your cancer with CT scan studies. Your doctor will help you decide if LDCT lung screening is right for you.  Do I need to have a screening exam every year?  Yes. If you are in the high-risk group described earlier, you should get an LDCT lung cancer screening exam every year until you are 79, or are no longer willing or able to undergo screening and possible procedures  to diagnose and treat lung cancer.  How effective is LDCT at preventing death from lung cancer?  Studies have shown that LDCT lung cancer screening can lower the risk of death from lung cancer by 20 percent in people who are at high-risk.  What are the risks?  There are some risks and limitations of LDCT lung cancer screening. We want to make sure you understand the risks and benefits, so please let us know if you have any questions. Your doctor may want to talk with you more about these risks.    Radiation exposure: As with any exam that uses radiation, there is a very small increased risk of cancer. The amount of radiation in LDCT is small--about the same amount a person would get from a mammogram. Your doctor orders the exam when he or she feels the potential benefits outweigh the risks.    False negatives: No test is perfect, including LDCT. It is possible that you may have a medical condition, including lung cancer, that is not found during your exam. This is called a false negative result.    False positives and more testing: LDCT very often finds something in the lung that could be cancer, but in fact is not. This is called a false positive result. False positive tests often cause anxiety. To make sure these findings are not cancer, you may need to have more tests. These tests will be done only if you give us permission. Sometimes patients need a treatment that can have side effects, such as a biopsy. For more information on false positives, see  What can I expect from the results?     Findings not related to lung cancer: Your LDCT exam also takes pictures of areas of your body next to your lungs. In a very small number of cases, the CT scan will show an abnormal finding in one of these areas, such as your kidneys, adrenal glands, liver or thyroid. This finding may not be serious, but you may need more tests. Your doctor can help you decide what other tests you may need, if any.  What can I expect from the  results?  About 1 out of 4 LDCT exams will find something that may need more tests. Most of the time, these findings are lung nodules. Lung nodules are very small collections of tissue in the lung. These nodules are very common, and the vast majority--more than 97 percent--are not cancer (benign). Most are normal lymph nodes or small areas of scarring from past infections.  But, if a small lung nodule is found to be cancer, the cancer can be cured more than 90 percent of the time. To know if the nodule is cancer, we may need to get more images before your next yearly screening exam. If the nodule has suspicious features (for example, it is large, has an odd shape or grows over time), we will refer you to a specialist for further testing.  Will my doctor also get the results?  Yes. Your doctor will get a copy of your results.  Is it okay to keep smoking now that there s a cancer screening exam?  No. Tobacco is one of the strongest cancer-causing agents. It causes not only lung cancer, but other cancers and cardiovascular (heart) diseases as well. The damage caused by smoking builds over time. This means that the longer you smoke, the higher your risk of disease. While it is never too late to quit, the sooner you quit, the better.  Where can I find help to quit smoking?  The best way to prevent lung cancer is to stop smoking. If you have already quit smoking, congratulations and keep it up! For help on quitting smoking, please call [employer] at 4-303-QUITNOW (1-373.386.1513) or the American [employer] Society at 1-945.705.3546 to find local resources near you.  One-on-one health coaching:  If you d prefer to work individually with a health care provider on tobacco cessation, we offer:      Medication Therapy Management:  Our specially trained pharmacists work closely with you and your doctor to help you quit smoking.  Call [phone] or [phone] (toll free).

## 2023-03-22 NOTE — NURSING NOTE
Prior to immunization administration, verified patients identity using patient s name and date of birth. Please see Immunization Activity for additional information.     Screening Questionnaire for Adult Immunization    Are you sick today?   No   Do you have allergies to medications, food, a vaccine component or latex?   No   Have you ever had a serious reaction after receiving a vaccination?   No   Do you have a long-term health problem with heart, lung, kidney, or metabolic disease (e.g., diabetes), asthma, a blood disorder, no spleen, complement component deficiency, a cochlear implant, or a spinal fluid leak?  Are you on long-term aspirin therapy?   Yes   Do you have cancer, leukemia, HIV/AIDS, or any other immune system problem?   No   Do you have a parent, brother, or sister with an immune system problem?   No   In the past 3 months, have you taken medications that affect  your immune system, such as prednisone, other steroids, or anticancer drugs; drugs for the treatment of rheumatoid arthritis, Crohn s disease, or psoriasis; or have you had radiation treatments?   No   Have you had a seizure, or a brain or other nervous system problem?   No   During the past year, have you received a transfusion of blood or blood    products, or been given immune (gamma) globulin or antiviral drug?   No   For women: Are you pregnant or is there a chance you could become       pregnant during the next month?   No   Have you received any vaccinations in the past 4 weeks?   No     Immunization questionnaire was positive for at least one answer.  Ok per guidelines for prevnar 20, flu shot and covid moderna bivalent .      Injection of moderna bivalent,flu , prevnar 20 given by [facility] CMA. Patient instructed to remain in clinic for 15 minutes afterwards, and to report any adverse reactions.     Screening performed by [facility] on 3/22/2023 at 7:58 AM.

## 2023-03-22 NOTE — PROGRESS NOTES
"SUBJECTIVE:   Dustin is a 67 year old who presents for Preventive Visit.  Additional Questions 3/22/2023   Roomed by EVERARDO Finn   Accompanied by self     Patient Reported Additional Medications 3/22/2023   Patient reports taking the following new medications none   Patient has been advised of split billing requirements and indicates understanding: Yes  Are you in the first 12 months of your Medicare coverage?  No    Healthy Habits:     In general, how would you rate your overall health?  Excellent    Frequency of exercise:  None    Do you usually eat at least 4 servings of fruit and vegetables a day, include whole grains    & fiber and avoid regularly eating high fat or \"junk\" foods?  No    Taking medications regularly:  Yes    Medication side effects:  None    Ability to successfully perform activities of daily living:  No assistance needed    Home Safety:  No safety concerns identified    Hearing Impairment:  Difficulty following a conversation in a noisy restaurant or crowded room, feel that people are mumbling or not speaking clearly, difficulty following dialogue in the theater, difficult to understand a speaker at a public meeting or Worship service, need to ask people to speak up or repeat themselves, find that men's voices are easier to understand than woman's, difficulty understanding soft or whispered speech and difficulty understanding speech on the telephone    In the past 6 months, have you been bothered by leaking of urine?  No    In general, how would you rate your overall mental or emotional health?  Excellent      PHQ-2 Total Score: 0    Additional concerns today:  Yes      Have you ever done Advance Care Planning? (For example, a Health Directive, POLST, or a discussion with a medical provider or your loved ones about your wishes): No, advance care planning information given to patient to review.  Patient plans to discuss their wishes with loved ones or provider.        Fall risk  Fallen 2 or " more times in the past year?: No  Any fall with injury in the past year?: No    Cognitive Screening   1) Repeat 3 items (Leader, Season, Table)    2) Clock draw: NORMAL  3) 3 item recall: Recalls 3 objects  Results: 3 items recalled: COGNITIVE IMPAIRMENT LESS LIKELY    Mini-CogTM Copyright RAPHAEL [employer]. Licensed by the author for use in [facility]; reprinted with permission (juan@[facility]). All rights reserved.      Do you have sleep apnea, excessive snoring or daytime drowsiness?: no    Reviewed and updated as needed this visit by clinical staff   Tobacco  Allergies  Meds              Reviewed and updated as needed this visit by Provider     Meds             Social History     Tobacco Use     Smoking status: Former     Packs/day: 1.00     Years: 40.00     Pack years: 40.00     Types: Cigarettes     Quit date: 2013     Years since quittin.2     Smokeless tobacco: Never   Substance Use Topics     Alcohol use: Yes     Comment: social         Alcohol Use 3/18/2023   Prescreen: >3 drinks/day or >7 drinks/week? No   No flowsheet data found.  Do you have a current opioid prescription? No  Do you use any other controlled substances or medications that are not prescribed by a provider? None    Needing order placed for lung scan    Current providers sharing in care for this patient include:   Patient Care Team:  Kirill Hauser MD as PCP - General (Family Practice)  Kirill Hauser MD as Assigned PCP  Brandan Jose MD as Assigned Heart and Vascular Provider    The following health maintenance items are reviewed in Epic and correct as of today:  Health Maintenance   Topic Date Due     COPD ACTION PLAN  Never done     LUNG CANCER SCREENING  2023     MEDICARE ANNUAL WELLNESS VISIT  2024     ANNUAL REVIEW OF HM ORDERS  2024     FALL RISK ASSESSMENT  2024     LIPID  [id number]     ADVANCE CARE PLANNING  2028     DTAP/TDAP/TD IMMUNIZATION (3 - Td or Tdap)  2028     COLORECTAL CANCER SCREENING  2030     SPIROMETRY  Completed     HEPATITIS C SCREENING  Completed     PHQ-2 (once per calendar year)  Completed     INFLUENZA VACCINE  Completed     Pneumococcal Vaccine: 65+ Years  Completed     ZOSTER IMMUNIZATION  Completed     AORTIC ANEURYSM SCREENING (SYSTEM ASSIGNED)  Completed     COVID-19 Vaccine  Completed     IPV IMMUNIZATION  Aged Out     MENINGITIS IMMUNIZATION  Aged Out     Patient Active Problem List   Diagnosis     HL (hearing loss)     COPD (chronic obstructive pulmonary disease) (H)     CARDIOVASCULAR SCREENING; LDL GOAL LESS THAN 130     Benign essential hypertension     Former smoker     Undiagnosed cardiac murmurs     Hyperlipidemia with target LDL less than 100     Status post coronary angiogram     Stage 1 mild COPD by GOLD classification (H)     Advanced directives, counseling/discussion     Tubular adenoma of colon     Bicuspid aortic valve     Overweight (BMI 25.0-29.9)     Ascending aorta dilatation (H)     Past Surgical History:   Procedure Laterality Date     ABDOMEN SURGERY       COLONOSCOPY       COLONOSCOPY N/A 2020    Procedure: COLONOSCOPY, WITH POLYPECTOMY AND BIOPSY;  Surgeon: Emerson Meyer MD;  Location: [facility]     ENT SURGERY      Tonsels     HERNIA REPAIR       ORTHOPEDIC SURGERY  2017    broken ankle       Social History     Tobacco Use     Smoking status: Former     Packs/day: 1.00     Years: 40.00     Pack years: 40.00     Types: Cigarettes     Quit date: 2013     Years since quittin.2     Smokeless tobacco: Never   Substance Use Topics     Alcohol use: Yes     Comment: social     Family History   Problem Relation Age of Onset     Diabetes Mother      Peripheral Vascular Disease Mother         mother w/ PAD     Heart Failure Mother      Heart Failure Maternal Half-Brother          of heart attack age 57     Coronary Artery Disease No family hx of          Current Outpatient Medications   Medication  "Sig Dispense Refill     albuterol (PROAIR HFA/PROVENTIL HFA/VENTOLIN HFA) 108 (90 Base) MCG/ACT inhaler Inhale 2 puffs into the lungs every 4 hours as needed for shortness of breath / dyspnea or wheezing 18 g 11     aspirin 81 MG tablet Take by mouth daily 30 tablet      calcium carbonate-vitamin D 500-400 MG-UNIT TABS tablt Take 1 tablet by mouth daily 180 tablet 3     fluticasone-salmeterol (ADVAIR) 250-50 MCG/ACT inhaler Inhale 1 puff into the lungs 2 times daily 1 each 11     lisinopril (ZESTRIL) 20 MG tablet TAKE 1 TABLET BY MOUTH ONCE DAILY 90 tablet 3     omega 3 1000 MG CAPS Take 1 g by mouth daily 90 capsule      rosuvastatin (CRESTOR) 10 MG tablet Take 1 tablet (10 mg) by mouth daily 90 tablet 3     Allergies   Allergen Reactions     Nka [No Known Allergies]      Pneumonia Vaccine: prevnar 20 today      Review of Systems   Constitutional: Negative for chills and fever.   HENT: Positive for congestion and hearing loss. Negative for ear pain and sore throat.    Eyes: Negative for pain and visual disturbance.   Respiratory: Positive for cough. Negative for shortness of breath.    Cardiovascular: Negative for chest pain, palpitations and peripheral edema.   Gastrointestinal: Positive for heartburn. Negative for abdominal pain, constipation, diarrhea, hematochezia and nausea.   Genitourinary: Negative for dysuria, frequency, genital sores, hematuria, impotence, penile discharge and urgency.   Musculoskeletal: Negative for arthralgias, joint swelling and myalgias.   Skin: Negative for rash.   Neurological: Negative for dizziness, weakness, headaches and paresthesias.   Psychiatric/Behavioral: Negative for mood changes. The patient is not nervous/anxious.          OBJECTIVE:   /76 (BP Location: Left arm, Patient Position: Chair, Cuff Size: Adult Regular)   Pulse 71   Temp 97.9  F (36.6  C) (Tympanic)   Resp 12   Ht 1.651 m (5' 5\")   Wt 77.9 kg (171 lb 12.8 oz)   SpO2 95%   BMI 28.59 kg/m   Estimated " "body mass index is 28.59 kg/m  as calculated from the following:    Height as of this encounter: 1.651 m (5' 5\").    Weight as of this encounter: 77.9 kg (171 lb 12.8 oz).  Physical Exam  GENERAL APPEARANCE: overweight, ambulatory w/o assist, alert and no distress  EYES: pink conj, no icterus, PERRL, EOMI  HENT: ear canals and TM's normal, nose and mouth without ulcers or lesions, oropharynx clear and oral mucous membranes moist  NECK: no adenopathy, no asymmetry, masses, or scars and thyroid normal to palpation  RESP: lungs clear to auscultation - no rales, rhonchi or wheezes  CV: regular rates and rhythm, normal S1 S2, no S3 or S4, no murmur, click or rub, no peripheral edema and peripheral pulses strong  ABDOMEN: soft, nontender, no hepatosplenomegaly, no masses and bowel sounds normal  RECTAL: deferred  MS: no musculoskeletal defects are noted and gait is age appropriate without ataxia  SKIN: no suspicious lesions or rashes  NEURO: Normal strength and tone, sensory exam grossly normal, mentation intact and speech normal    Diagnostic Test Results:  none     ASSESSMENT / PLAN:   Dustin was seen today for physical.    Diagnoses and all orders for this visit:    Encounter for Medicare annual wellness exam  -     PRIMARY CARE FOLLOW-UP SCHEDULING; Future  Patient was advised on recommended screening and preventive health recommendations.  He verbalized understanding and agreed to the plans below.    COPD, moderate (H)  Stable per patient.  No change in meds needed.    Ascending aorta dilatation (H)  Seeing cardiology next week.    Screen for colon cancer  Reviewed last colonoscopy surg path report and surgeon recommednation. Repeat in 10 years per surgeon.    Screening for prostate cancer  -     Prostate Specific Antigen Screen; Future    Mixed hyperlipidemia  -     Lipid panel reflex to direct LDL Fasting; Future  Reinforced heart healthy lifestyle.  Up to date with refills.    Benign essential hypertension  -     " "Basic metabolic panel; Future  Controlled.  Low salt, low fat diet.   Exercise as tolerated.  Take meds as prescribed; call if with side effects.   Up to date with refills.    Personal history of tobacco use  -     Prof fee: Shared Decision Making for Lung Cancer Screening  -     CT Chest Lung Cancer Scrn Low Dose wo; Future    Need for vaccination  -     Pneumococcal 20 Valent Conjugate (PCV20)  -     COVID-19 VACCINE BIVALENT BOOSTER 18+ (MODERNA)  -     INFLUENZA VACCINE 65+ (FLUZONE HD)    Other orders  -     REVIEW OF HEALTH MAINTENANCE PROTOCOL ORDERS        Patient has been advised of split billing requirements and indicates understanding: Yes      COUNSELING:  Reviewed preventive health counseling, as reflected in patient instructions      BMI:   Estimated body mass index is 28.59 kg/m  as calculated from the following:    Height as of this encounter: 1.651 m (5' 5\").    Weight as of this encounter: 77.9 kg (171 lb 12.8 oz).   Weight management plan: Discussed healthy diet and exercise guidelines      He reports that he quit smoking about 9 years ago. His smoking use included cigarettes. He has a 40.00 pack-year smoking history. He has never used smokeless tobacco.      Appropriate preventive services were discussed with this patient, including applicable screening as appropriate for cardiovascular disease, diabetes, osteopenia/osteoporosis, and glaucoma.  As appropriate for age/gender, discussed screening for colorectal cancer, prostate cancer, breast cancer, and cervical cancer. Checklist reviewing preventive services available has been given to the patient.    Reviewed patients plan of care and provided an AVS. The Basic Care Plan (routine screening as documented in Health Maintenance) for Dustin meets the Care Plan requirement. This Care Plan has been established and reviewed with the Patient.          Kirill Hauser MD  [facility]    Identified Health Risks:    I have reviewed " Opioid Use Disorder and Substance Use Disorder risk factors and made any needed referrals.       He is at risk for lack of exercise and has been provided with information to increase physical activity for the benefit of his well-being.  The patient was counseled and encouraged to consider modifying their diet and eating habits. He was provided with information on recommended healthy diet options.  The patient was provided with written information regarding signs of hearing loss.  He is at risk for lack of exercise and has been provided with information to increase physical activity for the benefit of his well-being.  The patient was counseled and encouraged to consider modifying their diet and eating habits. He was provided with information on recommended healthy diet options.  The patient was provided with written information regarding signs of hearing loss.  Lung Cancer Screening Shared Decision Making Visit     Dustin Shah, a 67 year old male, is eligible for lung cancer screening    History   Smoking Status     Former     Packs/day: 1.00     Years: 40.00     Types: Cigarettes     Quit date: 12/8/2013   Smokeless Tobacco     Never       I have discussed with patient the risks and benefits of screening for lung cancer with low-dose CT.     The risks include:    radiation exposure: one low dose chest CT has as much ionizing radiation as about 15 chest x-rays, or 6 months of background radiation living in [place]      false positives: most findings/nodules are NOT cancer, but some might still require additional diagnostic evaluation, including biopsy    over-diagnosis: some slow growing cancers that might never have been clinically significant will be detected and treated unnecessarily     The benefit of early detection of lung cancer is contingent upon adherence to annual screening or more frequent follow up if indicated.     Furthermore, to benefit from screening, Dustin must be willing and able to undergo  diagnostic procedures, if indicated. Although no specific guide is available for determining severity of comorbidities, it is reasonable to withhold screening in patients who have greater mortality risk from other diseases.     We did discuss that the best way to prevent lung cancer is to not smoke.    Some patients may value a numeric estimation of lung cancer risk when evaluating if lung cancer screening is right for them, here is one calculator:    ShouldIScreen

## 2023-03-28 ENCOUNTER — OFFICE VISIT (OUTPATIENT)
Dept: CARDIOLOGY | Facility: CLINIC | Age: 68
End: 2023-03-28
Attending: INTERNAL MEDICINE
Payer: COMMERCIAL

## 2023-03-28 VITALS
WEIGHT: 172 LBS | HEART RATE: 67 BPM | DIASTOLIC BLOOD PRESSURE: 85 MMHG | SYSTOLIC BLOOD PRESSURE: 136 MMHG | OXYGEN SATURATION: 98 % | BODY MASS INDEX: 28.62 KG/M2 | RESPIRATION RATE: 17 BRPM

## 2023-03-28 DIAGNOSIS — I77.810 ASCENDING AORTA DILATATION (H): ICD-10-CM

## 2023-03-28 DIAGNOSIS — Q23.81 BICUSPID AORTIC VALVE: Primary | ICD-10-CM

## 2023-03-28 PROCEDURE — 99214 OFFICE O/P EST MOD 30 MIN: CPT | Performed by: INTERNAL MEDICINE

## 2023-03-28 NOTE — PROGRESS NOTES
HPI and Plan:   Today, I had the pleasure of connecting with Dustin Shah for a follow-up visit.  He is a pleasant 67-year-old gentleman who presents the clinic for follow-up visit.  He used to smoke in the past and has emphysema.  He also has bicuspid aortic valve which is moderately stenosed. His ascending aorta is mildly dilated [4.1 cm].    He t is very physically active and has never experienced any chest symptoms.  He denies chest pain, chest tightness, palpitations, orthopnea, PND, lower extremity edema, presyncope or syncope.  He does not provide a family history of bicuspid aortic valve.  He had a coronary angiogram in 2015 for chest discomfort which showed mild nonobstructive coronary artery disease.  I reviewed and interpreted the images on Angio today. An echocardiogram performed prior to this visit which I reviewed with the patient and interpreted independently showed moderate Aortic stenosis.  There has not been any significant change when compared to the echocardiogram last year.  The size of aortic root and ascending aorta are also unchanged.  He has talked to his kids about getting screened but as far as he can tell they have not undergone a transesophageal echocardiogram.      Assessment:  1.  Bicuspid aortic valve with moderate aortic stenosis  2.  Mild dilation of aortic root and ascending aorta  3.  No coronary artery disease on coronary ngiogram in 2015  4.  Hyperlipidemia- on Crestor 10 mg    It was a pleasure to meet Dustin again in clinic today.  I again discussed the diagnosis of bicuspid aortic valve with the him and that he has moderate aortic stenosis and in general it progresses at 0.1 cm/year.  At this rate, he may require valve replacement in >2-3 years.  I told him that we need to monitor him with annual echocardiograms and consider aortic valve replacement when the time comes.  Fortunately, he does not have any symptoms at this time.  I also discussed that his first-degree relatives  should also be screened for bicuspid aortic valve and aortopathy.      Regarding hyperlipidemia his LDL checked last year was 89 mg/dL after initiation of Crestor.  I will continue to for now.      As far as mild dilation of ascending aorta is concerned it is part of aortopathy noted with bicuspid aortic valve.  His blood pressure is very well controlled and he is on lisinopril.  Hence I do not believe we need to make any changes to his medication regimen.    Plan:   1.  Follow-up with me in a year with repeat transthoracic echocardiogram  2.  Lifestyle modification with Mediterranean-style diet and regular exercise    Thank you for allowing me to participate in the care of Dustin Shah    This note was completed in part using Dragon voice recognition software. Although reviewed after completion, some word and grammatical errors may occur.    Brandan Jose MD  Cardiology    No orders of the defined types were placed in this encounter.      No orders of the defined types were placed in this encounter.      There are no discontinued medications.    Encounter Diagnosis   Name Primary?     Ascending aorta dilatation (H)        CURRENT MEDICATIONS:  Current Outpatient Medications   Medication Sig Dispense Refill     albuterol (PROAIR HFA/PROVENTIL HFA/VENTOLIN HFA) 108 (90 Base) MCG/ACT inhaler Inhale 2 puffs into the lungs every 4 hours as needed for shortness of breath / dyspnea or wheezing 18 g 11     aspirin 81 MG tablet Take by mouth daily 30 tablet      calcium carbonate-vitamin D 500-400 MG-UNIT TABS tablt Take 1 tablet by mouth daily 180 tablet 3     fluticasone-salmeterol (ADVAIR) 250-50 MCG/ACT inhaler Inhale 1 puff into the lungs 2 times daily 1 each 11     lisinopril (ZESTRIL) 20 MG tablet TAKE 1 TABLET BY MOUTH ONCE DAILY 90 tablet 3     omega 3 1000 MG CAPS Take 1 g by mouth daily 90 capsule      rosuvastatin (CRESTOR) 10 MG tablet Take 1 tablet (10 mg) by mouth daily 90 tablet 3       ALLERGIES      Allergies   Allergen Reactions     Nka [No Known Allergies]        PAST MEDICAL HISTORY:  Past Medical History:   Diagnosis Date     Bicuspid aortic valve      COPD (chronic obstructive pulmonary disease) (H) 10/2015     Hyperlipidemia 10/2015     Hypertension 10/2015       PAST SURGICAL HISTORY:  Past Surgical History:   Procedure Laterality Date     ABDOMEN SURGERY       COLONOSCOPY       COLONOSCOPY N/A 2020    Procedure: COLONOSCOPY, WITH POLYPECTOMY AND BIOPSY;  Surgeon: Emerson Meyer MD;  Location: [facility]     ENT SURGERY      Tonsels     HERNIA REPAIR       ORTHOPEDIC SURGERY  2017    broken ankle       FAMILY HISTORY:  Family History   Problem Relation Age of Onset     Diabetes Mother      Peripheral Vascular Disease Mother         mother w/ PAD     Heart Failure Mother      Heart Failure Maternal Half-Brother          of heart attack age 57     Coronary Artery Disease No family hx of        SOCIAL HISTORY:  Social History     Socioeconomic History     Marital status:      Spouse name: None     Number of children: None     Years of education: None     Highest education level: None   Tobacco Use     Smoking status: Former     Packs/day: 1.00     Years: 40.00     Pack years: 40.00     Types: Cigarettes     Quit date: 2013     Years since quittin.3     Smokeless tobacco: Never   Vaping Use     Vaping Use: Never used   Substance and Sexual Activity     Alcohol use: Yes     Comment: social     Drug use: No     Sexual activity: Not Currently   Other Topics Concern     Parent/sibling w/ CABG, MI or angioplasty before 65F 55M? Yes       Review of Systems:  Skin:          Eyes:         ENT:         Respiratory:  Negative       Cardiovascular:  Negative for;palpitations;chest pain;edema;fatigue;lightheadedness;dizziness      Gastroenterology:        Genitourinary:         Musculoskeletal:         Neurologic:         Psychiatric:         Heme/Lymph/Imm:         Endocrine:            Physical Exam:  Vitals: /85 (BP Location: Right arm, Patient Position: Sitting, Cuff Size: Adult Regular)   Pulse 67   Resp 17   Wt 78 kg (172 lb)   SpO2 98%   BMI 28.62 kg/m    Eyes: No icterus.  Pulmonary: Chest symmetric, lungs clear bilaterally and no crackles, wheezes or rales.  Cardiovascular: RRR with normal S1 and S2, no murmur, JVP normal.  Musculoskeletal: Edema of the lower extremities: None.  Neurologic: Oriented and appropriate without obvious focal deficits.   Psychiatric: Normal affect.     Recent Lab Results:  LIPID RESULTS:  Lab Results   Component Value Date    CHOL 178 02/22/2022    CHOL 216 (H) 10/22/2020    HDL 63 02/22/2022    HDL 65 10/22/2020    LDL 89 02/22/2022     (H) 10/22/2020    TRIG 130 02/22/2022    TRIG 120 10/22/2020    CHOLHDLRATIO 3.9 10/30/2015       LIVER ENZYME RESULTS:  Lab Results   Component Value Date    AST 30 09/08/2008    ALT 24 09/08/2008       CBC RESULTS:  Lab Results   Component Value Date    WBC 9.6 11/09/2015    RBC 5.23 11/09/2015    HGB 16.2 11/09/2015    HCT 47.9 11/09/2015    MCV 92 11/09/2015    MCH 31.0 11/09/2015    MCHC 33.8 11/09/2015    RDW 13.4 11/09/2015     11/09/2015       BMP RESULTS:  Lab Results   Component Value Date     02/22/2022     10/22/2020    POTASSIUM 4.5 02/22/2022    POTASSIUM 4.4 10/22/2020    CHLORIDE 103 02/22/2022    CHLORIDE 104 10/22/2020    CO2 29 02/22/2022    CO2 27 10/22/2020    ANIONGAP 3 02/22/2022    ANIONGAP 4 10/22/2020    GLC 97 02/22/2022     (H) 10/22/2020    BUN 18 02/22/2022    BUN 15 10/22/2020    CR 0.94 02/22/2022    CR 1.02 10/22/2020    GFRESTIMATED 89 02/22/2022    GFRESTIMATED 77 10/22/2020    GFRESTBLACK 89 10/22/2020    OTTO 10.0 02/22/2022    OTTO 8.6 10/22/2020        A1C RESULTS:  Lab Results   Component Value Date    A1C 5.6 12/02/2013       INR RESULTS:  No results found for: INR    CC  Brandan Jose MD  [address]  [place] [id number]    All  medical records were reviewed in detail and discussed with the patient. Greater than 30 mins were spent with the patient, 50% of this time was spent on counseling and coordination of care.  After visit summary was printed and given to the patient.

## 2023-03-28 NOTE — LETTER
3/28/2023    Kirill Hauser MD  [address] [facility] [address]    RE: Dustin Shah       Dear Colleague,     I had the pleasure of seeing Dustin Shah in the [facility].  HPI and Plan:   Today, I had the pleasure of connecting with Dustin Shah for a follow-up visit.  He is a pleasant 67-year-old gentleman who presents the clinic for follow-up visit.  He used to smoke in the past and has emphysema.  He also has bicuspid aortic valve which is moderately stenosed. His ascending aorta is mildly dilated [4.1 cm].    He t is very physically active and has never experienced any chest symptoms.  He denies chest pain, chest tightness, palpitations, orthopnea, PND, lower extremity edema, presyncope or syncope.  He does not provide a family history of bicuspid aortic valve.  He had a coronary angiogram in 2015 for chest discomfort which showed mild nonobstructive coronary artery disease.  I reviewed and interpreted the images on Angio today. An echocardiogram performed prior to this visit which I reviewed with the patient and interpreted independently showed moderate Aortic stenosis.  There has not been any significant change when compared to the echocardiogram last year.  The size of aortic root and ascending aorta are also unchanged.  He has talked to his kids about getting screened but as far as he can tell they have not undergone a transesophageal echocardiogram.      Assessment:  1.  Bicuspid aortic valve with moderate aortic stenosis  2.  Mild dilation of aortic root and ascending aorta  3.  No coronary artery disease on coronary ngiogram in 2015  4.  Hyperlipidemia- on Crestor 10 mg    It was a pleasure to meet Dustin again in clinic today.  I again discussed the diagnosis of bicuspid aortic valve with the him and that he has moderate aortic stenosis and in general it progresses at 0.1 cm/year.  At this rate, he may require valve replacement in >2-3 years.  I told him that we  need to monitor him with annual echocardiograms and consider aortic valve replacement when the time comes.  Fortunately, he does not have any symptoms at this time.  I also discussed that his first-degree relatives should also be screened for bicuspid aortic valve and aortopathy.      Regarding hyperlipidemia his LDL checked last year was 89 mg/dL after initiation of Crestor.  I will continue to for now.      As far as mild dilation of ascending aorta is concerned it is part of aortopathy noted with bicuspid aortic valve.  His blood pressure is very well controlled and he is on lisinopril.  Hence I do not believe we need to make any changes to his medication regimen.    Plan:   1.  Follow-up with me in a year with repeat transthoracic echocardiogram  2.  Lifestyle modification with Mediterranean-style diet and regular exercise    Thank you for allowing me to participate in the care of Dustin Shah    This note was completed in part using Dragon voice recognition software. Although reviewed after completion, some word and grammatical errors may occur.    Brandan Jose MD  Cardiology    No orders of the defined types were placed in this encounter.      No orders of the defined types were placed in this encounter.      There are no discontinued medications.    Encounter Diagnosis   Name Primary?     Ascending aorta dilatation (H)        CURRENT MEDICATIONS:  Current Outpatient Medications   Medication Sig Dispense Refill     albuterol (PROAIR HFA/PROVENTIL HFA/VENTOLIN HFA) 108 (90 Base) MCG/ACT inhaler Inhale 2 puffs into the lungs every 4 hours as needed for shortness of breath / dyspnea or wheezing 18 g 11     aspirin 81 MG tablet Take by mouth daily 30 tablet      calcium carbonate-vitamin D 500-400 MG-UNIT TABS tablt Take 1 tablet by mouth daily 180 tablet 3     fluticasone-salmeterol (ADVAIR) 250-50 MCG/ACT inhaler Inhale 1 puff into the lungs 2 times daily 1 each 11     lisinopril (ZESTRIL) 20 MG tablet TAKE 1  TABLET BY MOUTH ONCE DAILY 90 tablet 3     omega 3 1000 MG CAPS Take 1 g by mouth daily 90 capsule      rosuvastatin (CRESTOR) 10 MG tablet Take 1 tablet (10 mg) by mouth daily 90 tablet 3       ALLERGIES     Allergies   Allergen Reactions     Nka [No Known Allergies]        PAST MEDICAL HISTORY:  Past Medical History:   Diagnosis Date     Bicuspid aortic valve      COPD (chronic obstructive pulmonary disease) (H) 10/2015     Hyperlipidemia 10/2015     Hypertension 10/2015       PAST SURGICAL HISTORY:  Past Surgical History:   Procedure Laterality Date     ABDOMEN SURGERY       COLONOSCOPY       COLONOSCOPY N/A 2020    Procedure: COLONOSCOPY, WITH POLYPECTOMY AND BIOPSY;  Surgeon: Emerson Meyer MD;  Location: [facility]     ENT SURGERY      Tonsels     HERNIA REPAIR       ORTHOPEDIC SURGERY  2017    broken ankle       FAMILY HISTORY:  Family History   Problem Relation Age of Onset     Diabetes Mother      Peripheral Vascular Disease Mother         mother w/ PAD     Heart Failure Mother      Heart Failure Maternal Half-Brother          of heart attack age 57     Coronary Artery Disease No family hx of        SOCIAL HISTORY:  Social History     Socioeconomic History     Marital status:      Spouse name: None     Number of children: None     Years of education: None     Highest education level: None   Tobacco Use     Smoking status: Former     Packs/day: 1.00     Years: 40.00     Pack years: 40.00     Types: Cigarettes     Quit date: 2013     Years since quittin.3     Smokeless tobacco: Never   Vaping Use     Vaping Use: Never used   Substance and Sexual Activity     Alcohol use: Yes     Comment: social     Drug use: No     Sexual activity: Not Currently   Other Topics Concern     Parent/sibling w/ CABG, MI or angioplasty before 65F 55M? Yes       Review of Systems:  Skin:          Eyes:         ENT:         Respiratory:  Negative       Cardiovascular:  Negative for;palpitations;chest  pain;edema;fatigue;lightheadedness;dizziness      Gastroenterology:        Genitourinary:         Musculoskeletal:         Neurologic:         Psychiatric:         Heme/Lymph/Imm:         Endocrine:           Physical Exam:  Vitals: /85 (BP Location: Right arm, Patient Position: Sitting, Cuff Size: Adult Regular)   Pulse 67   Resp 17   Wt 78 kg (172 lb)   SpO2 98%   BMI 28.62 kg/m    Eyes: No icterus.  Pulmonary: Chest symmetric, lungs clear bilaterally and no crackles, wheezes or rales.  Cardiovascular: RRR with normal S1 and S2, no murmur, JVP normal.  Musculoskeletal: Edema of the lower extremities: None.  Neurologic: Oriented and appropriate without obvious focal deficits.   Psychiatric: Normal affect.     Recent Lab Results:  LIPID RESULTS:  Lab Results   Component Value Date    CHOL 178 02/22/2022    CHOL 216 (H) 10/22/2020    HDL 63 02/22/2022    HDL 65 10/22/2020    LDL 89 02/22/2022     (H) 10/22/2020    TRIG 130 02/22/2022    TRIG 120 10/22/2020    CHOLHDLRATIO 3.9 10/30/2015       LIVER ENZYME RESULTS:  Lab Results   Component Value Date    AST 30 09/08/2008    ALT 24 09/08/2008       CBC RESULTS:  Lab Results   Component Value Date    WBC 9.6 11/09/2015    RBC 5.23 11/09/2015    HGB 16.2 11/09/2015    HCT 47.9 11/09/2015    MCV 92 11/09/2015    MCH 31.0 11/09/2015    MCHC 33.8 11/09/2015    RDW 13.4 11/09/2015     11/09/2015       BMP RESULTS:  Lab Results   Component Value Date     02/22/2022     10/22/2020    POTASSIUM 4.5 02/22/2022    POTASSIUM 4.4 10/22/2020    CHLORIDE 103 02/22/2022    CHLORIDE 104 10/22/2020    CO2 29 02/22/2022    CO2 27 10/22/2020    ANIONGAP 3 02/22/2022    ANIONGAP 4 10/22/2020    GLC 97 02/22/2022     (H) 10/22/2020    BUN 18 02/22/2022    BUN 15 10/22/2020    CR 0.94 02/22/2022    CR 1.02 10/22/2020    GFRESTIMATED 89 02/22/2022    GFRESTIMATED 77 10/22/2020    GFRESTBLACK 89 10/22/2020    OTTO 10.0 02/22/2022    OTTO 8.6 10/22/2020         A1C RESULTS:  Lab Results   Component Value Date    A1C 5.6 12/02/2013       INR RESULTS:  No results found for: INR    CC  Brandan Jose MD  [address]  [place] [id number]    All medical records were reviewed in detail and discussed with the patient. Greater than 30 mins were spent with the patient, 50% of this time was spent on counseling and coordination of care.  After visit summary was printed and given to the patient.    Thank you for allowing me to participate in the care of your patient.      Sincerely,     Brandan Jose MD     [facility]  cc:   Brandan Jose MD  [address]  [place] [id number]

## 2023-03-29 ENCOUNTER — HOSPITAL ENCOUNTER (OUTPATIENT)
Dept: CT IMAGING | Facility: CLINIC | Age: 68
Discharge: HOME | End: 2023-03-29
Attending: FAMILY MEDICINE | Admitting: FAMILY MEDICINE
Payer: COMMERCIAL

## 2023-03-29 ENCOUNTER — TELEPHONE (OUTPATIENT)
Dept: FAMILY MEDICINE | Facility: CLINIC | Age: 68
End: 2023-03-29
Payer: COMMERCIAL

## 2023-03-29 DIAGNOSIS — Z87.891 PERSONAL HISTORY OF TOBACCO USE: ICD-10-CM

## 2023-03-29 PROCEDURE — 71271 CT THORAX LUNG CANCER SCR C-: CPT

## 2023-03-29 NOTE — TELEPHONE ENCOUNTER
Dr Hauser, Imaging has called and wants to report an incidental finding on the CT of Chest , today.  #2 Coronary artery calcium moderate or severe

## 2023-03-30 NOTE — RESULT ENCOUNTER NOTE
Dr. Damon Almaguer.    I'm routing to you Dustin's LDCT showing moderate to severe coronary artery calcium.  I noticed in your last note that based on his last angio in 2015, he has no CAD. He's on statin and aspirin now and his HTN is controlled so at least he is taking the appropriate meds for that. I thought I'd pass this on to you in case this could change your plan of follow up for him.  Thank you!    Kirill Hauser M.D.

## 2023-07-22 DIAGNOSIS — E78.2 MIXED HYPERLIPIDEMIA: ICD-10-CM

## 2023-07-24 RX ORDER — ROSUVASTATIN CALCIUM 10 MG/1
10 TABLET, COATED ORAL DAILY
Qty: 90 TABLET | Refills: 0 | Status: SHIPPED | OUTPATIENT
Start: 2023-07-24 | End: 2023-10-20

## 2023-08-22 ENCOUNTER — LAB (OUTPATIENT)
Dept: LAB | Facility: CLINIC | Age: 68
End: 2023-08-22
Payer: COMMERCIAL

## 2023-08-22 DIAGNOSIS — E78.2 MIXED HYPERLIPIDEMIA: ICD-10-CM

## 2023-08-22 DIAGNOSIS — Z12.5 SCREENING FOR PROSTATE CANCER: ICD-10-CM

## 2023-08-22 DIAGNOSIS — I10 BENIGN ESSENTIAL HYPERTENSION: ICD-10-CM

## 2023-08-22 LAB
ANION GAP SERPL CALCULATED.3IONS-SCNC: 8 MMOL/L (ref 7–15)
BUN SERPL-MCNC: 16.5 MG/DL (ref 8–23)
CALCIUM SERPL-MCNC: 9.7 MG/DL (ref 8.8–10.2)
CHLORIDE SERPL-SCNC: 103 MMOL/L (ref 98–107)
CHOLEST SERPL-MCNC: 149 MG/DL
CREAT SERPL-MCNC: 0.93 MG/DL (ref 0.67–1.17)
DEPRECATED HCO3 PLAS-SCNC: 26 MMOL/L (ref 22–29)
GFR SERPL CREATININE-BSD FRML MDRD: 89 ML/MIN/1.73M2
GLUCOSE SERPL-MCNC: 98 MG/DL (ref 70–99)
HDLC SERPL-MCNC: 66 MG/DL
LDLC SERPL CALC-MCNC: 66 MG/DL
NONHDLC SERPL-MCNC: 83 MG/DL
POTASSIUM SERPL-SCNC: 4.6 MMOL/L (ref 3.4–5.3)
PSA SERPL DL<=0.01 NG/ML-MCNC: 2.14 NG/ML (ref 0–4.5)
SODIUM SERPL-SCNC: 137 MMOL/L (ref 136–145)
TRIGL SERPL-MCNC: 85 MG/DL

## 2023-08-22 PROCEDURE — 80048 BASIC METABOLIC PNL TOTAL CA: CPT

## 2023-08-22 PROCEDURE — G0103 PSA SCREENING: HCPCS

## 2023-08-22 PROCEDURE — 80061 LIPID PANEL: CPT

## 2023-08-22 PROCEDURE — 36415 COLL VENOUS BLD VENIPUNCTURE: CPT

## 2023-10-09 ENCOUNTER — PATIENT OUTREACH (OUTPATIENT)
Dept: GASTROENTEROLOGY | Facility: CLINIC | Age: 68
End: 2023-10-09
Payer: COMMERCIAL

## 2023-10-20 DIAGNOSIS — E78.2 MIXED HYPERLIPIDEMIA: ICD-10-CM

## 2023-10-20 RX ORDER — ROSUVASTATIN CALCIUM 10 MG/1
TABLET, COATED ORAL
Qty: 90 TABLET | Refills: 1 | Status: SHIPPED | OUTPATIENT
Start: 2023-10-20 | End: 2024-04-16

## 2024-02-20 DIAGNOSIS — J44.9 COPD, MODERATE (H): ICD-10-CM

## 2024-02-20 RX ORDER — ALBUTEROL SULFATE 90 UG/1
2 AEROSOL, METERED RESPIRATORY (INHALATION) EVERY 4 HOURS PRN
Qty: 18 G | Refills: 0 | Status: SHIPPED | OUTPATIENT
Start: 2024-02-20 | End: 2024-04-02

## 2024-03-05 DIAGNOSIS — I10 BENIGN ESSENTIAL HYPERTENSION: ICD-10-CM

## 2024-03-05 RX ORDER — LISINOPRIL 20 MG/1
TABLET ORAL
Qty: 90 TABLET | Refills: 0 | Status: SHIPPED | OUTPATIENT
Start: 2024-03-05 | End: 2024-04-02

## 2024-03-11 ENCOUNTER — MYC REFILL (OUTPATIENT)
Dept: FAMILY MEDICINE | Facility: CLINIC | Age: 69
End: 2024-03-11
Payer: COMMERCIAL

## 2024-03-11 DIAGNOSIS — J44.9 COPD, MODERATE (H): ICD-10-CM

## 2024-03-11 DIAGNOSIS — I10 BENIGN ESSENTIAL HYPERTENSION: ICD-10-CM

## 2024-03-11 RX ORDER — LISINOPRIL 20 MG/1
20 TABLET ORAL DAILY
Qty: 90 TABLET | Refills: 0 | OUTPATIENT
Start: 2024-03-11

## 2024-03-11 RX ORDER — FLUTICASONE PROPIONATE AND SALMETEROL 250; 50 UG/1; UG/1
POWDER RESPIRATORY (INHALATION)
Qty: 60 EACH | Refills: 0 | Status: SHIPPED | OUTPATIENT
Start: 2024-03-11 | End: 2024-04-02

## 2024-03-26 SDOH — HEALTH STABILITY: PHYSICAL HEALTH: ON AVERAGE, HOW MANY MINUTES DO YOU ENGAGE IN EXERCISE AT THIS LEVEL?: 0 MIN

## 2024-03-26 SDOH — HEALTH STABILITY: PHYSICAL HEALTH: ON AVERAGE, HOW MANY DAYS PER WEEK DO YOU ENGAGE IN MODERATE TO STRENUOUS EXERCISE (LIKE A BRISK WALK)?: 0 DAYS

## 2024-03-26 ASSESSMENT — SOCIAL DETERMINANTS OF HEALTH (SDOH): HOW OFTEN DO YOU GET TOGETHER WITH FRIENDS OR RELATIVES?: ONCE A WEEK

## 2024-03-27 NOTE — COMMUNITY RESOURCES LIST (ENGLISH)
March 27, 2024           YOUR PERSONALIZED LIST OF SERVICES & PROGRAMS           & RECREATION    Sports      [facility]  [address] [place] [address] (Distance: 17.8 miles)  Phone: [phone]) [phone]  Website: https://www.[employer].gov/acc  Language: English  Fee: Free, Self pay      [facility] - Sports clubs and recreational activities - [facility]  [address] [address] (Distance: 11.3 miles)  Language: English  Fee: Self pay, Sliding scale      [facility] - Adult Enrichment  Phone: [phone]  Website: [email or web address]-[email or web address]  Language: English  Hours: Mon 7:30 AM - 4:00 PM Tue 7:30 AM - 4:00 PM Wed 7:30 AM - 4:00 PM Thu 7:30 AM - 4:00 PM Fri 7:30 AM - 4:00 PM    Classes/Groups      [facility] - Sit and Be Fit/[employer] [facility]  [address] [place] [address] (Distance: 12.8 miles)  Website: https://physicaltherapySparkcentral/programs/sit-and-be-fit/  Language: English  Fee: Free      [facility] - Exercise Class - [employer] Healthy Senior Fitness Program  [address] [place] [address] (Distance: 4.3 miles)  Phone: [phone]  Website: https://www.ConvertMedia/senior  Language: English  Fee: Self pay, Insurance  Accessibility: Ada accessible      [facility] - Adult Enrichment  Phone: [phone]) [phone]  Website: [email or web address]  Language: English  Hours: Mon 7:30 AM - 4:00 PM Tue 7:30 AM - 4:00 PM Wed 7:30 AM - 4:00 PM Thu 7:30 AM - 4:00 PM Fri 7:30 AM - 4:00 PM               IMPORTANT NUMBERS & WEBSITES        Emergency Services  911  .   United Way  211 http://211unitedway.org  .   Poison Control  (028) 202-7409 http://[place]poison.org http://wis[place]poison.org  .     Suicide and Crisis Lifeline  988 http://988[facility] Hospit[facility].org  .   Childhelp [place] Child Abuse Hotline  634.828.4564  http://Childhelphotline.org   .   National Sexual Assault Hotline  (544) 245-3288 (HOPE) http://Rainn.org   .     National Runaway Safeline  (412) 869-7999 (RUNAWAY) http://Medsphere Systems.org  .   Pregnancy & Postpartum Support  Call/text 637-637-1899  [place]: http://ppsupportmn.org  [place]: http://Wannyi.com/wi  .   Substance Abuse National Helpline ([facility])  390-620-HELP (1618) http://Findtreatment.gov   .                DISCLAIMER: [employer] does not endorse any service providers mentioned in this resource list. [employer] does not guarantee that the services mentioned in this resource list will be available to you or will improve your health or wellness.    [facility]

## 2024-04-02 ENCOUNTER — OFFICE VISIT (OUTPATIENT)
Dept: FAMILY MEDICINE | Facility: CLINIC | Age: 69
End: 2024-04-02
Attending: FAMILY MEDICINE
Payer: COMMERCIAL

## 2024-04-02 VITALS
TEMPERATURE: 97.8 F | OXYGEN SATURATION: 97 % | RESPIRATION RATE: 20 BRPM | SYSTOLIC BLOOD PRESSURE: 124 MMHG | BODY MASS INDEX: 28.92 KG/M2 | DIASTOLIC BLOOD PRESSURE: 76 MMHG | WEIGHT: 173.6 LBS | HEART RATE: 72 BPM | HEIGHT: 65 IN

## 2024-04-02 DIAGNOSIS — G56.03 BILATERAL CARPAL TUNNEL SYNDROME: ICD-10-CM

## 2024-04-02 DIAGNOSIS — I77.810 ASCENDING AORTA DILATATION (H): ICD-10-CM

## 2024-04-02 DIAGNOSIS — E78.5 HYPERLIPIDEMIA WITH TARGET LDL LESS THAN 100: ICD-10-CM

## 2024-04-02 DIAGNOSIS — Z12.5 SCREENING FOR PROSTATE CANCER: ICD-10-CM

## 2024-04-02 DIAGNOSIS — Z00.00 ENCOUNTER FOR MEDICARE ANNUAL WELLNESS EXAM: ICD-10-CM

## 2024-04-02 DIAGNOSIS — I10 BENIGN ESSENTIAL HYPERTENSION: ICD-10-CM

## 2024-04-02 DIAGNOSIS — Z00.00 ROUTINE GENERAL MEDICAL EXAMINATION AT A HEALTH CARE FACILITY: Primary | ICD-10-CM

## 2024-04-02 DIAGNOSIS — J44.9 COPD, MODERATE (H): ICD-10-CM

## 2024-04-02 DIAGNOSIS — Z87.891 PERSONAL HISTORY OF TOBACCO USE: ICD-10-CM

## 2024-04-02 PROCEDURE — 91320 SARSCV2 VAC 30MCG TRS-SUC IM: CPT | Performed by: FAMILY MEDICINE

## 2024-04-02 PROCEDURE — 99214 OFFICE O/P EST MOD 30 MIN: CPT | Mod: 25 | Performed by: FAMILY MEDICINE

## 2024-04-02 PROCEDURE — 90471 IMMUNIZATION ADMIN: CPT | Performed by: FAMILY MEDICINE

## 2024-04-02 PROCEDURE — G0296 VISIT TO DETERM LDCT ELIG: HCPCS | Performed by: FAMILY MEDICINE

## 2024-04-02 PROCEDURE — 90662 IIV NO PRSV INCREASED AG IM: CPT | Performed by: FAMILY MEDICINE

## 2024-04-02 PROCEDURE — 90480 ADMN SARSCOV2 VAC 1/ONLY CMP: CPT | Performed by: FAMILY MEDICINE

## 2024-04-02 PROCEDURE — 99397 PER PM REEVAL EST PAT 65+ YR: CPT | Mod: 25 | Performed by: FAMILY MEDICINE

## 2024-04-02 RX ORDER — LISINOPRIL 20 MG/1
20 TABLET ORAL DAILY
Qty: 90 TABLET | Refills: 3 | Status: SHIPPED | OUTPATIENT
Start: 2024-04-02

## 2024-04-02 RX ORDER — ALBUTEROL SULFATE 90 UG/1
2 AEROSOL, METERED RESPIRATORY (INHALATION) EVERY 4 HOURS PRN
Qty: 18 G | Refills: 3 | Status: SHIPPED | OUTPATIENT
Start: 2024-04-02

## 2024-04-02 RX ORDER — FLUTICASONE PROPIONATE AND SALMETEROL 250; 50 UG/1; UG/1
POWDER RESPIRATORY (INHALATION)
Qty: 180 EACH | Refills: 3 | Status: SHIPPED | OUTPATIENT
Start: 2024-04-02

## 2024-04-02 ASSESSMENT — PAIN SCALES - GENERAL: PAINLEVEL: NO PAIN (0)

## 2024-04-02 NOTE — PROGRESS NOTES
"Preventive Care Visit  [facility]  Kirill Hauser MD, Family Medicine  Apr 2, 2024      Assessment & Plan     Encounter for Medicare annual wellness exam  Patient was advised on recommended screening and preventive health recommendations.  He verbalized understanding and agreed to the plans below.   - PRIMARY CARE FOLLOW-UP SCHEDULING    Benign essential hypertension  Controlled.  Low salt, low fat diet.   Exercise as tolerated.  Take meds as prescribed; call if with side effects.    - lisinopril (ZESTRIL) 20 MG tablet  Dispense: 90 tablet; Refill: 3  - Basic metabolic panel  - OFFICE/OUTPT VISIT,EST,LEVL IV    COPD, moderate (H)  Stable.  Updated vaccines today.  Patient will wait for fall to get RSV.  - fluticasone-salmeterol (ADVAIR) 250-50 MCG/ACT inhaler  Dispense: 180 each; Refill: 3  - albuterol (PROAIR HFA/PROVENTIL HFA/VENTOLIN HFA) 108 (90 Base) MCG/ACT inhaler  Dispense: 18 g; Refill: 3  - OFFICE/OUTPT VISIT,EST,LEVL IV    Ascending aorta dilatation (H24)  Patient is scheduled for echocardiogram soon. Follows cardiology.    Personal history of tobacco use  LDCT ordered for surveillance.    Screening for prostate cancer  - Prostate Specific Antigen Screen    Bilateral carpal tunnel syndrome  EMG in 2021 showed bilateral CTS but patient has not seen ortho.  Hence, referral.  - Orthopedic  Referral    Hyperlipidemia with target LDL less than 100  Reinforced heart healthy lifestyle.  - Lipid panel reflex to direct LDL Fasting      Patient has been advised of split billing requirements and indicates understanding: Yes      BMI  Estimated body mass index is 28.67 kg/m  as calculated from the following:    Height as of this encounter: 1.657 m (5' 5.25\").    Weight as of this encounter: 78.7 kg (173 lb 9.6 oz).   Weight management plan: Discussed healthy diet and exercise guidelines    Counseling  Appropriate preventive services were discussed with this patient, including " applicable screening as appropriate for fall prevention, nutrition, physical activity, Tobacco-use cessation, weight loss and cognition.  Checklist reviewing preventive services available has been given to the patient.  Reviewed patient's diet, addressing concerns and/or questions.   The patient was provided with written information regarding signs of hearing loss.       Patient Instructions     Preventive Care Advice   This is general advice given by our system to help you stay healthy. However, your care team may have specific advice just for you. Please talk to your care team about your preventive care needs.  Nutrition  Eat 5 or more servings of fruits and vegetables each day.  Try wheat bread, brown rice and whole grain pasta (instead of white bread, rice, and pasta).  Get enough calcium and vitamin D. Check the label on foods and aim for 100% of the RDA (recommended daily allowance).  Lifestyle  Exercise at least 150 minutes each week   (30 minutes a day, 5 days a week).  Do muscle strengthening activities 2 days a week. These help control your weight and prevent disease.  No smoking.  Wear sunscreen to prevent skin cancer.  Have a dental exam and cleaning every 6 months.  Yearly exams  See your health care team every year to talk about:  Any changes in your health.  Any medicines your care team has prescribed.  Preventive care, family planning, and ways to prevent chronic diseases.  Shots (vaccines)   HPV shots (up to age 26), if you've never had them before.  Hepatitis B shots (up to age 59), if you've never had them before.  COVID-19 shot: Get this shot when it's due.  Flu shot: Get a flu shot every year.  Tetanus shot: Get a tetanus shot every 10 years.  Pneumococcal, hepatitis A, and RSV shots: Ask your care team if you need these based on your risk.  Shingles shot (for age 50 and up).  General health tests  Diabetes screening:  Starting at age 35, Get screened for diabetes at least every 3 years.  If you  are younger than age 35, ask your care team if you should be screened for diabetes.  Cholesterol test: At age 39, start having a cholesterol test every 5 years, or more often if advised.  Bone density scan (DEXA): At age 50, ask your care team if you should have this scan for osteoporosis (brittle bones).  Hepatitis C: Get tested at least once in your life.  STIs (sexually transmitted infections)  Before age 24: Ask your care team if you should be screened for STIs.  After age 24: Get screened for STIs if you're at risk. You are at risk for STIs (including HIV) if:  You are sexually active with more than one person.  You don't use condoms every time.  You or a partner was diagnosed with a sexually transmitted infection.  If you are at risk for HIV, ask about PrEP medicine to prevent HIV.  Get tested for HIV at least once in your life, whether you are at risk for HIV or not.  Cancer screening tests  Cervical cancer screening: If you have a cervix, begin getting regular cervical cancer screening tests at age 21. Most people who have regular screenings with normal results can stop after age 65. Talk about this with your provider.  Breast cancer scan (mammogram): If you've ever had breasts, begin having regular mammograms starting at age 40. This is a scan to check for breast cancer.  Colon cancer screening: It is important to start screening for colon cancer at age 45.  Have a colonoscopy test every 10 years (or more often if you're at risk) Or, ask your provider about stool tests like a FIT test every year or Cologuard test every 3 years.  To learn more about your testing options, visit: https://www.Songkick/916335.pdf.  For help making a decision, visit: https://bit.ly/xp02513.  Prostate cancer screening test: If you have a prostate and are age 55 to 69, ask your provider if you would benefit from a yearly prostate cancer screening test.  Lung cancer screening: If you are a current or former smoker age 50 to 80,  ask your care team if ongoing lung cancer screenings are right for you.  For informational purposes only. Not to replace the advice of your health care provider. Copyright   2023 [facility] [employer]. All rights reserved. Clinically reviewed by the [facility] Transitions Program. [employer] 169961 - REV 01/24.    Hearing Loss: Care Instructions  Overview     Hearing loss is a sudden or slow decrease in how well you hear. It can range from slight to profound. Permanent hearing loss can occur with aging. It also can happen when you are exposed long-term to loud noise. Examples include listening to loud music, riding motorcycles, or being around other loud machines.  Hearing loss can affect your work and home life. It can make you feel lonely or depressed. You may feel that you have lost your independence. But hearing aids and other devices can help you hear better and feel connected to others.  Follow-up care is a key part of your treatment and safety. Be sure to make and go to all appointments, and call your doctor if you are having problems. It's also a good idea to know your test results and keep a list of the medicines you take.  How can you care for yourself at home?  Avoid loud noises whenever possible. This helps keep your hearing from getting worse.  Always wear hearing protection around loud noises.  Wear a hearing aid as directed.  A professional can help you pick a hearing aid that will work best for you.  You can also get hearing aids over the counter for mild to moderate hearing loss.  Have hearing tests as your doctor suggests. They can show whether your hearing has changed. Your hearing aid may need to be adjusted.  Use other devices as needed. These may include:  Telephone amplifiers and hearing aids that can connect to a television, stereo, radio, or microphone.  Devices that use lights or vibrations. These alert you to the doorbell, a ringing telephone, or a baby monitor.  Television  "closed-captioning. This shows the words at the bottom of the screen. Most new TVs can do this.  TTY (text telephone). This lets you type messages back and forth on the telephone instead of talking or listening. These devices are also called TDD. When messages are typed on the keyboard, they are sent over the phone line to a receiving TTY. The message is shown on a monitor.  Use text messaging, social media, and email if it is hard for you to communicate by telephone.  Try to learn a listening technique called speechreading. It is not lipreading. You pay attention to people's gestures, expressions, posture, and tone of voice. These clues can help you understand what a person is saying. Face the person you are talking to, and have them face you. Make sure the lighting is good. You need to see the other person's face clearly.  Think about counseling if you need help to adjust to your hearing loss.  When should you call for help?  Watch closely for changes in your health, and be sure to contact your doctor if:    You think your hearing is getting worse.     You have new symptoms, such as dizziness or nausea.   Where can you learn more?  Go to https://www.Connexity.net/patiented  Enter R798 in the search box to learn more about \"Hearing Loss: Care Instructions.\"  Current as of: September 27, 2023               Content Version: 14.0    4442-7204 L3.   Care instructions adapted under license by your healthcare professional. If you have questions about a medical condition or this instruction, always ask your healthcare professional. [employer] disclaims any warranty or liability for your use of this information.      Boaz Chan is a 68 year old, presenting for the following:  Physical, Hypertension, and Lipids        4/2/2024     7:44 AM   Additional Questions   Roomed by Nellie WEBER MA   Accompanied by Self         4/2/2024     7:44 AM   Patient Reported Additional Medications " "  Patient reports taking the following new medications none        Health Care Directive  Patient does not have a Health Care Directive or Living Will: Discussed advance care planning with patient; information given to patient to review.    HPI      Hyperlipidemia Follow-Up    Are you regularly taking any medication or supplement to lower your cholesterol?   Yes- rosuvastatin 10mg  Are you having muscle aches or other side effects that you think could be caused by your cholesterol lowering medication?  No    Hypertension Follow-up    Do you check your blood pressure regularly outside of the clinic? No   Are you following a low salt diet? Yes  Are your blood pressures ever more than 140 on the top number (systolic) OR more   than 90 on the bottom number (diastolic), for example 140/90? No, unknown    COPD Follow-Up  Overall, how are your COPD symptoms since your last clinic visit?  No change  How much fatigue or shortness of breath do you have when you are walking?  None  How much shortness of breath do you have when you are resting?  None  How often do you cough? Often - first thing in the morning only  Have you noticed any change in your sputum/phlegm?  No  Have you experienced a recent fever? No  Please describe how far you can walk without stopping to rest:  Greater than 2 miles  How many flights of stairs are you able to walk up without stopping?  3 or more  Have you had any Emergency Room Visits, Urgent Care Visits, or Hospital Admissions because of your COPD since your last office visit?  No    History   Smoking Status    Former    Packs/day: 1.00    Years: 40.00    Types: Cigarettes    Quit date: 12/8/2013   Smokeless Tobacco    Never     No results found for: \"FEV1\", \"BPC2UPK\"      3/26/2024   General Health   How would you rate your overall physical health? Good   Feel stress (tense, anxious, or unable to sleep) Not at all         3/26/2024   Nutrition   Diet: Regular (no restrictions)         3/26/2024 "   Exercise   Days per week of moderate/strenous exercise 0 days   Average minutes spent exercising at this level 0 min   (!) EXERCISE CONCERN      3/26/2024   Social Factors   Frequency of gathering with friends or relatives Once a week   Worry food won't last until get money to buy more No   Food not last or not have enough money for food? No   Do you have housing?  Yes   Are you worried about losing your housing? No   Lack of transportation? No   Unable to get utilities (heat,electricity)? No         3/29/2024   Fall Risk   Fallen 2 or more times in the past year? No   Trouble with walking or balance? No          3/26/2024   Activities of Daily Living- Home Safety   Needs help with the following daily activites None of the above   Safety concerns in the home None of the above         3/26/2024   Dental   Dentist two times every year? Yes         3/26/2024   Hearing Screening   Hearing concerns? (!) IT'S HARDER TO UNDERSTAND WOMEN'S VOICES THAN MEN'S VOICES.    (!) IT'S HARD TO FOLLOW A CONVERSATION IN A NOISY RESTAURANT OR CROWDED ROOM.    (!) TROUBLE UNDERSTANDING SOFT OR WHISPERED SPEECH.         3/26/2024   Driving Risk Screening   Patient/family members have concerns about driving No         3/26/2024   General Alertness/Fatigue Screening   Have you been more tired than usual lately? No         3/26/2024   Urinary Incontinence Screening   Bothered by leaking urine in past 6 months No         3/26/2024   TB Screening   Were you born outside of the [place]? No         Today's PHQ-2 Score:       4/2/2024     7:45 AM   PHQ-2 ( 1999 Pfizer)   Q1: Little interest or pleasure in doing things 0   Q2: Feeling down, depressed or hopeless 0   PHQ-2 Score 0   Q1: Little interest or pleasure in doing things Not at all   Q2: Feeling down, depressed or hopeless Not at all   PHQ-2 Score 0           3/26/2024   Substance Use   Alcohol more than 3/day or more than 7/wk No   Do you have a current opioid prescription? No   How  severe/bad is pain from 1 to 10? 1/10   Do you use any other substances recreationally? No     Social History     Tobacco Use    Smoking status: Former     Packs/day: 1.00     Years: 40.00     Additional pack years: 0.00     Total pack years: 40.00     Types: Cigarettes     Quit date: 12/8/2013     Years since quitting: 10.3    Smokeless tobacco: Never   Vaping Use    Vaping Use: Never used   Substance Use Topics    Alcohol use: Yes     Comment: social    Drug use: No       Last PSA:   PSA   Date Value Ref Range Status   01/21/2020 0.81 0 - 4 ug/L Final     Comment:     Assay Method:  Chemiluminescence using Siemens Vista analyzer     Prostate Specific Antigen Screen   Date Value Ref Range Status   08/22/2023 2.14 0.00 - 4.50 ng/mL Final     ASCVD Risk   The 10-year ASCVD risk score (Ingrid CAMPBELL, et al., 2019) is: 12.8%    Values used to calculate the score:      Age: 68 years      Sex: Male      Is Non- : No      Diabetic: No      Tobacco smoker: No      Systolic Blood Pressure: 124 mmHg      Is BP treated: Yes      HDL Cholesterol: 66 mg/dL      Total Cholesterol: 149 mg/dL            Reviewed and updated as needed this visit by Provider                    Past Medical History:   Diagnosis Date    Bicuspid aortic valve     COPD (chronic obstructive pulmonary disease) (H) 10/2015    Hyperlipidemia 10/2015    Hypertension 10/2015     Past Surgical History:   Procedure Laterality Date    ABDOMEN SURGERY      COLONOSCOPY      COLONOSCOPY N/A 2/14/2020    Procedure: COLONOSCOPY, WITH POLYPECTOMY AND BIOPSY;  Surgeon: Emerson Meyer MD;  Location: [facility]    ENT SURGERY      Tonsels    HERNIA REPAIR      ORTHOPEDIC SURGERY  feb 2017    broken ankle     Patient Active Problem List   Diagnosis    HL (hearing loss)    COPD (chronic obstructive pulmonary disease) (H)    CARDIOVASCULAR SCREENING; LDL GOAL LESS THAN 130    Benign essential hypertension    Former smoker    Undiagnosed cardiac  murmurs    Hyperlipidemia with target LDL less than 100    Status post coronary angiogram    Stage 1 mild COPD by GOLD classification (H)    Advanced directives, counseling/discussion    Tubular adenoma of colon    Bicuspid aortic valve    Overweight (BMI 25.0-29.9)    Ascending aorta dilatation (H24)     Past Surgical History:   Procedure Laterality Date    ABDOMEN SURGERY      COLONOSCOPY      COLONOSCOPY N/A 2020    Procedure: COLONOSCOPY, WITH POLYPECTOMY AND BIOPSY;  Surgeon: Emerson Meyer MD;  Location: [facility]    ENT SURGERY      Tonsels    HERNIA REPAIR      ORTHOPEDIC SURGERY  2017    broken ankle       Social History     Tobacco Use    Smoking status: Former     Packs/day: 1.00     Years: 40.00     Additional pack years: 0.00     Total pack years: 40.00     Types: Cigarettes     Quit date: 2013     Years since quitting: 10.3    Smokeless tobacco: Never   Substance Use Topics    Alcohol use: Yes     Comment: social     Family History   Problem Relation Age of Onset    Diabetes Mother     Peripheral Vascular Disease Mother         mother w/ PAD    Heart Failure Mother     Heart Failure Maternal Half-Brother          of heart attack age 57    Coronary Artery Disease No family hx of          Current Outpatient Medications   Medication Sig Dispense Refill    albuterol (PROAIR HFA/PROVENTIL HFA/VENTOLIN HFA) 108 (90 Base) MCG/ACT inhaler Inhale 2 puffs into the lungs every 4 hours as needed for shortness of breath / dyspnea or wheezing 18 g 0    aspirin 81 MG tablet Take by mouth daily 30 tablet     calcium carbonate-vitamin D 500-400 MG-UNIT TABS tablt Take 1 tablet by mouth daily 180 tablet 3    fluticasone-salmeterol (ADVAIR) 250-50 MCG/ACT inhaler Inhale 1 puff into the lungs 2 times daily 60 each 0    lisinopril (ZESTRIL) 20 MG tablet TAKE 1 TABLET BY MOUTH ONCE DAILY 90 tablet 0    omega 3 1000 MG CAPS Take 1 g by mouth daily 90 capsule     rosuvastatin (CRESTOR) 10 MG tablet Take 1  "tablet (10 mg) by mouth daily. needs labs. 90 tablet 1     Allergies   Allergen Reactions    Nka [No Known Allergies]      Current providers sharing in care for this patient include:  Patient Care Team:  Kirill Hauser MD as PCP - General (Family Practice)  Kirill Hauser MD as Assigned PCP  Brandan Jose MD as Assigned Heart and Vascular Provider    The following health maintenance items are reviewed in Epic and correct as of today:  Health Maintenance   Topic Date Due    COPD ACTION PLAN  Never done    RSV VACCINE (Pregnancy & 60+) (1 - 1-dose 60+ series) Never done    INFLUENZA VACCINE (1) 09/01/2023    COVID-19 Vaccine (5 - 2023-24 season) 09/01/2023    ANNUAL REVIEW OF HM ORDERS  03/22/2024    LUNG CANCER SCREENING  03/29/2024    MEDICARE ANNUAL WELLNESS VISIT  03/22/2024    LIPID  08/22/2024    FALL RISK ASSESSMENT  04/02/2025    GLUCOSE  08/22/2026    ADVANCE CARE PLANNING  03/22/2028    DTAP/TDAP/TD IMMUNIZATION (3 - Td or Tdap) 12/27/2028    COLORECTAL CANCER SCREENING  02/14/2030    SPIROMETRY  Completed    HEPATITIS C SCREENING  Completed    PHQ-2 (once per calendar year)  Completed    Pneumococcal Vaccine: 65+ Years  Completed    ZOSTER IMMUNIZATION  Completed    AORTIC ANEURYSM SCREENING (SYSTEM ASSIGNED)  Completed    IPV IMMUNIZATION  Aged Out    HPV IMMUNIZATION  Aged Out    MENINGITIS IMMUNIZATION  Aged Out    RSV MONOCLONAL ANTIBODY  Aged Out         Review of Systems  Constitutional, HEENT, cardiovascular, pulmonary, GI, , musculoskeletal, neuro, skin, endocrine and psych systems are negative, except as otherwise noted.    Patient reports chronic left wrist pain.      Objective    Exam  /76 (BP Location: Right arm, Patient Position: Sitting, Cuff Size: Adult Regular)   Pulse 72   Temp 97.8  F (36.6  C) (Tympanic)   Resp 20   Ht 1.657 m (5' 5.25\")   Wt 78.7 kg (173 lb 9.6 oz)   SpO2 97%   BMI 28.67 kg/m     Estimated body mass index is 28.67 kg/m  as " "calculated from the following:    Height as of this encounter: 1.657 m (5' 5.25\").    Weight as of this encounter: 78.7 kg (173 lb 9.6 oz).    Physical Exam  GENERAL APPEARANCE: overweight, ambulatory w/o assist,  alert and no distress  EYES: pink conj, no icterus, PERRL, EOMI  HENT: ear canals and TM's normal, nose and mouth without ulcers or lesions, oropharynx clear and oral mucous membranes moist  NECK: no adenopathy, no asymmetry, masses, or scars and thyroid normal to palpation  RESP: lungs clear to auscultation - no rales, rhonchi or wheezes  CV: regular rates and rhythm, normal S1 S2, no S3 or S4, no murmur, click or rub, no peripheral edema and peripheral pulses strong  ABDOMEN: soft, nontender, no hepatosplenomegaly, no masses and bowel sounds normal  RECTAL: deferred  MS: no musculoskeletal defects are noted and gait is age appropriate without ataxia  SKIN: no suspicious lesions or rashes  NEURO: Normal strength and tone, sensory exam grossly normal, mentation intact and speech normal         4/2/2024   Mini Cog   Clock Draw Score 2 Normal   3 Item Recall 2 objects recalled   Mini Cog Total Score 4             Signed Electronically by: Kirill Hauser MD  Lung Cancer Screening Shared Decision Making Visit     Dustin Shah, a 68 year old male, is eligible for lung cancer screening    History   Smoking Status    Former    Packs/day: 1.00    Years: 40.00    Types: Cigarettes    Quit date: 12/8/2013   Smokeless Tobacco    Never       I have discussed with patient the risks and benefits of screening for lung cancer with low-dose CT.     The risks include:    radiation exposure: one low dose chest CT has as much ionizing radiation as about 15 chest x-rays, or 6 months of background radiation living in [place]      false positives: most findings/nodules are NOT cancer, but some might still require additional diagnostic evaluation, including biopsy    over-diagnosis: some slow growing cancers that might " never have been clinically significant will be detected and treated unnecessarily     The benefit of early detection of lung cancer is contingent upon adherence to annual screening or more frequent follow up if indicated.     Furthermore, to benefit from screening, Dustin must be willing and able to undergo diagnostic procedures, if indicated. Although no specific guide is available for determining severity of comorbidities, it is reasonable to withhold screening in patients who have greater mortality risk from other diseases.     We did discuss that the best way to prevent lung cancer is to not smoke.    Some patients may value a numeric estimation of lung cancer risk when evaluating if lung cancer screening is right for them, here is one calculator:    ShouldIScreen

## 2024-04-02 NOTE — PATIENT INSTRUCTIONS
Schedule fasting lab appointment for August 2024.  Be consistent with low salt, low trans fat and low saturated fat diet.  Eat food rich in omega-3-fatty acids as you tolerate. (salmon, olive oil)  Eat 5 cups of vegetables, fruits and whole grains per day.  Limit starchy food (white rice, white bread, white pasta, white potatoes) to less than a cup per meal.  Minimize sweets, junk food and fastfood. Limit soda beverages to one serving per day; best to avoid it altogether though.    Exercise: moderate intensity sustained for at least 30 mins per episode, goal of 150 mins per week at least  Combine cardiovascular and resistance exercises.  These exercise recommendations are in addition to your daily activity at work or home.  Work on losing weight.    To schedule the low dose CT scan for lung cancer screening, call [phone]    You will be contacted in 1-2 business days to get a schedule for the orthopedics specialist for carpal tunnel syndrome.    Get RSV vaccine in the fall.    Preventative Care Visits include: Yearly physicals, Well-child visits, Welcome to Medicare visits, & Medicare yearly wellness exams.    The purpose of these visits is to discuss your medical history and prevent health problems before you are sick.  You may need to pay a copay, coinsurance or deductible if your visit today includes testing or treating for a new or existing condition.    Additional charges may be incurred for today's visit. If you have questions about what your insurance plan covers, please contact your Insurance Company's member service department.  If you have questions specific to a bill you have already received from [facility], please contact the Corporate Billing Office at [phone].  . .   Preventive Care Advice   This is general advice given by our system to help you stay healthy. However, your care team may have specific advice just for you. Please talk to your care team about your preventive care  needs.  Nutrition  Eat 5 or more servings of fruits and vegetables each day.  Try wheat bread, brown rice and whole grain pasta (instead of white bread, rice, and pasta).  Get enough calcium and vitamin D. Check the label on foods and aim for 100% of the RDA (recommended daily allowance).  Lifestyle  Exercise at least 150 minutes each week   (30 minutes a day, 5 days a week).  Do muscle strengthening activities 2 days a week. These help control your weight and prevent disease.  No smoking.  Wear sunscreen to prevent skin cancer.  Have a dental exam and cleaning every 6 months.  Yearly exams  See your health care team every year to talk about:  Any changes in your health.  Any medicines your care team has prescribed.  Preventive care, family planning, and ways to prevent chronic diseases.  Shots (vaccines)   HPV shots (up to age 26), if you've never had them before.  Hepatitis B shots (up to age 59), if you've never had them before.  COVID-19 shot: Get this shot when it's due.  Flu shot: Get a flu shot every year.  Tetanus shot: Get a tetanus shot every 10 years.  Pneumococcal, hepatitis A, and RSV shots: Ask your care team if you need these based on your risk.  Shingles shot (for age 50 and up).  General health tests  Diabetes screening:  Starting at age 35, Get screened for diabetes at least every 3 years.  If you are younger than age 35, ask your care team if you should be screened for diabetes.  Cholesterol test: At age 39, start having a cholesterol test every 5 years, or more often if advised.  Bone density scan (DEXA): At age 50, ask your care team if you should have this scan for osteoporosis (brittle bones).  Hepatitis C: Get tested at least once in your life.  STIs (sexually transmitted infections)  Before age 24: Ask your care team if you should be screened for STIs.  After age 24: Get screened for STIs if you're at risk. You are at risk for STIs (including HIV) if:  You are sexually active with more than  one person.  You don't use condoms every time.  You or a partner was diagnosed with a sexually transmitted infection.  If you are at risk for HIV, ask about PrEP medicine to prevent HIV.  Get tested for HIV at least once in your life, whether you are at risk for HIV or not.  Cancer screening tests  Cervical cancer screening: If you have a cervix, begin getting regular cervical cancer screening tests at age 21. Most people who have regular screenings with normal results can stop after age 65. Talk about this with your provider.  Breast cancer scan (mammogram): If you've ever had breasts, begin having regular mammograms starting at age 40. This is a scan to check for breast cancer.  Colon cancer screening: It is important to start screening for colon cancer at age 45.  Have a colonoscopy test every 10 years (or more often if you're at risk) Or, ask your provider about stool tests like a FIT test every year or Cologuard test every 3 years.  To learn more about your testing options, visit: https://www.[employer]/615333.pdf.  For help making a decision, visit: https://MobileDay.6sicuro.it/at35672.  Prostate cancer screening test: If you have a prostate and are age 55 to 69, ask your provider if you would benefit from a yearly prostate cancer screening test.  Lung cancer screening: If you are a current or former smoker age 50 to 80, ask your care team if ongoing lung cancer screenings are right for you.  For informational purposes only. Not to replace the advice of your health care provider. Copyright   2023 [facility]. All rights reserved. Clinically reviewed by the [facility] Transitions Program. [employer] 205972 - REV 01/24.    Hearing Loss: Care Instructions  Overview     Hearing loss is a sudden or slow decrease in how well you hear. It can range from slight to profound. Permanent hearing loss can occur with aging. It also can happen when you are exposed long-term to loud noise. Examples include listening to  loud music, riding motorcycles, or being around other loud machines.  Hearing loss can affect your work and home life. It can make you feel lonely or depressed. You may feel that you have lost your independence. But hearing aids and other devices can help you hear better and feel connected to others.  Follow-up care is a key part of your treatment and safety. Be sure to make and go to all appointments, and call your doctor if you are having problems. It's also a good idea to know your test results and keep a list of the medicines you take.  How can you care for yourself at home?  Avoid loud noises whenever possible. This helps keep your hearing from getting worse.  Always wear hearing protection around loud noises.  Wear a hearing aid as directed.  A professional can help you pick a hearing aid that will work best for you.  You can also get hearing aids over the counter for mild to moderate hearing loss.  Have hearing tests as your doctor suggests. They can show whether your hearing has changed. Your hearing aid may need to be adjusted.  Use other devices as needed. These may include:  Telephone amplifiers and hearing aids that can connect to a television, stereo, radio, or microphone.  Devices that use lights or vibrations. These alert you to the doorbell, a ringing telephone, or a baby monitor.  Television closed-captioning. This shows the words at the bottom of the screen. Most new TVs can do this.  TTY (text telephone). This lets you type messages back and forth on the telephone instead of talking or listening. These devices are also called TDD. When messages are typed on the keyboard, they are sent over the phone line to a receiving TTY. The message is shown on a monitor.  Use text messaging, social media, and email if it is hard for you to communicate by telephone.  Try to learn a listening technique called speechreading. It is not lipreading. You pay attention to people's gestures, expressions, posture, and  "tone of voice. These clues can help you understand what a person is saying. Face the person you are talking to, and have them face you. Make sure the lighting is good. You need to see the other person's face clearly.  Think about counseling if you need help to adjust to your hearing loss.  When should you call for help?  Watch closely for changes in your health, and be sure to contact your doctor if:    You think your hearing is getting worse.     You have new symptoms, such as dizziness or nausea.   Where can you learn more?  Go to https://www.[employer].net/patiented  Enter R798 in the search box to learn more about \"Hearing Loss: Care Instructions.\"  Current as of: September 27, 2023               Content Version: 14.0    2907-5430 [email or web address].   Care instructions adapted under license by your healthcare professional. If you have questions about a medical condition or this instruction, always ask your healthcare professional. [employer] disclaims any warranty or liability for your use of this information.      Lung Cancer Screening   Frequently Asked Questions  If you are at high-risk for lung cancer, getting screened with low-dose computed tomography (LDCT) every year can help save your life. This handout offers answers to some of the most common questions about lung cancer screening. If you have other questions, please call [phone] ([phone]).     What is it?  Lung cancer screening uses special X-ray technology to create an image of your lung tissue. The exam is quick and easy and takes less than 10 seconds. We don t give you any medicine or use any needles. You can eat before and after the exam. You don t need to change your clothes as long as the clothing on your chest doesn t contain metal. But, you do need to be able to hold your breath for at least 6 seconds during the exam.    What is the goal of lung cancer screening?  The goal of lung cancer screening is to " save lives. Many times, lung cancer is not found until a person starts having physical symptoms. Lung cancer screening can help detect lung cancer in the earliest stages when it may be easier to treat.    Who should be screened for lung cancer?  We suggest lung cancer screening for anyone who is at high-risk for lung cancer. You are in the high-risk group if you:      are between the ages of 55 and 79, and    have smoked at least 1 pack of cigarettes a day for 20 or more years, and    still smoke or have quit within the past 15 years.    However, if you have a new cough or shortness of breath, you should talk to your doctor before being screened.    Why does it matter if I have symptoms?  Certain symptoms can be a sign that you have a condition in your lungs that should be checked and treated by your doctor. These symptoms include fever, chest pain, a new or changing cough, shortness of breath that you have never felt before, coughing up blood or unexplained weight loss. Having any of these symptoms can greatly affect the results of lung cancer screening.       Should all smokers get an LDCT lung cancer screening exam?  It depends. Lung cancer screening is for a very specific group of men and women who have a history of heavy smoking over a long period of time (see  Who should be screened for lung cancer  above).  I am in the high-risk group, but have been diagnosed with cancer in the past. Is LDCT lung cancer screening right for me?  In some cases, you should not have LDCT lung screening, such as when your doctor is already following your cancer with CT scan studies. Your doctor will help you decide if LDCT lung screening is right for you.  Do I need to have a screening exam every year?  Yes. If you are in the high-risk group described earlier, you should get an LDCT lung cancer screening exam every year until you are 79, or are no longer willing or able to undergo screening and possible procedures to diagnose and  treat lung cancer.  How effective is LDCT at preventing death from lung cancer?  Studies have shown that LDCT lung cancer screening can lower the risk of death from lung cancer by 20 percent in people who are at high-risk.  What are the risks?  There are some risks and limitations of LDCT lung cancer screening. We want to make sure you understand the risks and benefits, so please let us know if you have any questions. Your doctor may want to talk with you more about these risks.    Radiation exposure: As with any exam that uses radiation, there is a very small increased risk of cancer. The amount of radiation in LDCT is small--about the same amount a person would get from a mammogram. Your doctor orders the exam when he or she feels the potential benefits outweigh the risks.    False negatives: No test is perfect, including LDCT. It is possible that you may have a medical condition, including lung cancer, that is not found during your exam. This is called a false negative result.    False positives and more testing: LDCT very often finds something in the lung that could be cancer, but in fact is not. This is called a false positive result. False positive tests often cause anxiety. To make sure these findings are not cancer, you may need to have more tests. These tests will be done only if you give us permission. Sometimes patients need a treatment that can have side effects, such as a biopsy. For more information on false positives, see  What can I expect from the results?     Findings not related to lung cancer: Your LDCT exam also takes pictures of areas of your body next to your lungs. In a very small number of cases, the CT scan will show an abnormal finding in one of these areas, such as your kidneys, adrenal glands, liver or thyroid. This finding may not be serious, but you may need more tests. Your doctor can help you decide what other tests you may need, if any.  What can I expect from the results?  About 1  out of 4 LDCT exams will find something that may need more tests. Most of the time, these findings are lung nodules. Lung nodules are very small collections of tissue in the lung. These nodules are very common, and the vast majority--more than 97 percent--are not cancer (benign). Most are normal lymph nodes or small areas of scarring from past infections.  But, if a small lung nodule is found to be cancer, the cancer can be cured more than 90 percent of the time. To know if the nodule is cancer, we may need to get more images before your next yearly screening exam. If the nodule has suspicious features (for example, it is large, has an odd shape or grows over time), we will refer you to a specialist for further testing.  Will my doctor also get the results?  Yes. Your doctor will get a copy of your results.  Is it okay to keep smoking now that there s a cancer screening exam?  No. Tobacco is one of the strongest cancer-causing agents. It causes not only lung cancer, but other cancers and cardiovascular (heart) diseases as well. The damage caused by smoking builds over time. This means that the longer you smoke, the higher your risk of disease. While it is never too late to quit, the sooner you quit, the better.  Where can I find help to quit smoking?  The best way to prevent lung cancer is to stop smoking. If you have already quit smoking, congratulations and keep it up! For help on quitting smoking, please call QuitWorksteady.io at 8-713-QUITNOW (1-790.235.5606) or the American Cancer Society at 1-906.830.3511 to find local resources near you.  One-on-one health coaching:  If you d prefer to work individually with a health care provider on tobacco cessation, we offer:      Medication Therapy Management:  Our specially trained pharmacists work closely with you and your doctor to help you quit smoking.  Call [phone] or [phone] (toll free).

## 2024-04-09 ENCOUNTER — HOSPITAL ENCOUNTER (OUTPATIENT)
Dept: CARDIOLOGY | Facility: CLINIC | Age: 69
Discharge: HOME | End: 2024-04-09
Attending: INTERNAL MEDICINE | Admitting: INTERNAL MEDICINE
Payer: COMMERCIAL

## 2024-04-09 DIAGNOSIS — I77.810 ASCENDING AORTA DILATATION (H): ICD-10-CM

## 2024-04-09 DIAGNOSIS — Q23.81 BICUSPID AORTIC VALVE: ICD-10-CM

## 2024-04-09 LAB — LVEF ECHO: NORMAL

## 2024-04-09 PROCEDURE — 93306 TTE W/DOPPLER COMPLETE: CPT | Mod: 26 | Performed by: INTERNAL MEDICINE

## 2024-04-09 PROCEDURE — 93306 TTE W/DOPPLER COMPLETE: CPT

## 2024-04-11 ENCOUNTER — TELEPHONE (OUTPATIENT)
Dept: FAMILY MEDICINE | Facility: CLINIC | Age: 69
End: 2024-04-11

## 2024-04-11 ENCOUNTER — HOSPITAL ENCOUNTER (OUTPATIENT)
Dept: CT IMAGING | Facility: CLINIC | Age: 69
Discharge: HOME | End: 2024-04-11
Attending: FAMILY MEDICINE | Admitting: FAMILY MEDICINE
Payer: COMMERCIAL

## 2024-04-11 DIAGNOSIS — Z87.891 PERSONAL HISTORY OF TOBACCO USE: ICD-10-CM

## 2024-04-11 PROCEDURE — 71271 CT THORAX LUNG CANCER SCR C-: CPT

## 2024-04-11 NOTE — TELEPHONE ENCOUNTER
His CAD is a known condition. He sees cardiology and actually has an appointment with them in mid-May 2024.

## 2024-04-11 NOTE — TELEPHONE ENCOUNTER
[facility] called to report and incidental finding on the CT chest.  Today CT showed:    Moderate coronary artery atherosclerosis is present      Thank you    Yamini SPRAGUE RN

## 2024-04-12 ENCOUNTER — OFFICE VISIT (OUTPATIENT)
Dept: ORTHOPEDICS | Facility: CLINIC | Age: 69
End: 2024-04-12
Payer: COMMERCIAL

## 2024-04-12 ENCOUNTER — ANCILLARY PROCEDURE (OUTPATIENT)
Dept: GENERAL RADIOLOGY | Facility: CLINIC | Age: 69
End: 2024-04-12
Attending: PEDIATRICS
Payer: COMMERCIAL

## 2024-04-12 DIAGNOSIS — G56.03 BILATERAL CARPAL TUNNEL SYNDROME: ICD-10-CM

## 2024-04-12 DIAGNOSIS — M19.039 WRIST ARTHRITIS: Primary | ICD-10-CM

## 2024-04-12 PROCEDURE — 73130 X-RAY EXAM OF HAND: CPT | Mod: TC | Performed by: RADIOLOGY

## 2024-04-12 PROCEDURE — 99204 OFFICE O/P NEW MOD 45 MIN: CPT | Performed by: PEDIATRICS

## 2024-04-12 NOTE — PROGRESS NOTES
ASSESSMENT & PLAN    Dustin was seen today for pain, numbness, numbness and pain.    Diagnoses and all orders for this visit:    Wrist arthritis    Bilateral carpal tunnel syndrome  -     Orthopedic  Referral  -     XR Hand Bilateral G/E 3 Views; Future  -     Orthopedic  Referral; Future      This issue is chronic and Worsening.        ICD-10-CM    1. Wrist arthritis  M19.039       2. Bilateral carpal tunnel syndrome  G56.03 Orthopedic  Referral     XR Hand Bilateral G/E 3 Views     Orthopedic  Referral        Patient Instructions   Discussed anatomy and pathophysiology of carpal tunnel. Discussed avoidance of exacerbating activities and use of braces.  Also discussed formal occupational hand therapy, consideration of injections. Patient would like to discuss options with orthopedic surgery.    For wrist arthritis - We discussed supportive care of bracing and OTC medications, role of occupational hand therapy, consideration of injections and surgical referral.    Plan:  - Today's Plan of Care:  Referral to an Orthopedic Surgeon to discuss options for carpal tunnel    Discussed activity considerations and other supportive care including Ice/Heat, OTC and other topical medications as needed.  Diclofenac/Voltaren Gel    Discussed bracing options    -We also discussed other future treatment options:  Referral to Occupational Hand Therapy  Consideration of US guided injections (carpal tunnel v. CMC joint)    Follow Up: as needed    Concerning signs and symptoms were reviewed and all questions were answered at this time.    Thanks for the opportunity to participate in the care of this patient, I will keep you updated on their progress.    CC: Kirill Gillette MD [facility]  Sports Medicine Physician  [facility]    -----  Chief Complaint   Patient presents with    Right Hand - Pain, Numbness    Left Hand - Numbness, Pain       SUBJECTIVE  Dustin  TIA Shah is a/an 68 year old male who is seen in consultation at the request of  Kirill Hauser M.D. for evaluation of EDUARDO hand/wrist pain.     The patient is seen by themselves.  The patient is Left handed    Onset: 10 years(s) ago. Reports insidious onset without acute precipitating event.  Location of Pain: bilateral hand/wrist, pain along with numbness and tingling  Worsened by: grabbing, twisting, pulling, wrist flexion  Better with: moving/shaking hands   Treatments tried: casting/splinting/bracing, EMG 2/16/2021  Associated symptoms: numbness, tingling, weakness of EDUARDO hands, and feeling of instability    Orthopedic/Surgical history: YES - Date: chronic EDUARDO hand pain, previously dx with carpal tunnel, had EMG previously (see below)    Social History/Occupation:  previously, now retired    Discussed with PCP at a recent visit who referred to our clinic      REVIEW OF SYSTEMS:  Review of Systems    OBJECTIVE:  There were no vitals taken for this visit.   General: healthy, alert and in no distress  Skin: no suspicious lesions or rash.  CV: distal perfusion intact   Resp: normal respiratory effort without conversational dyspnea   Psych: normal mood and affect  Gait: NORMAL  Neuro: Normal light sensory exam of upper extremity    Bilateral Wrist and Hand exam    Inspection:       Arthritis deformities bilaterally    Tender:       Mild volar wrist    Non Tender:       Remainder of the Wrist and Hand bilateral    ROM:       Slightly decreased ROM bilateral wrists    Strength:       5/5 strength in the muscles of the hand, wrist and forearm bilateral    Special Tests:        neg (-) Tinel's test bilateral and       positive (+) Phalen's test bilateral    Neurovascular:       2+ radial pulses bilaterally with brisk capillary refill and      normal sensation to light touch in the radial, median and ulnar nerve distributions    RADIOLOGY:  Final results and radiologist's interpretation, available in the  [facility] health record.  Images were reviewed with the patient in the office today.  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  Probable mild right median neuropathy at the wrist.  Possible mild left ulnar neuropathy at the elbow.     Review of prior external note(s) from - PCP  Review of the result(s) of each unique test - XR and EMG

## 2024-04-12 NOTE — PATIENT INSTRUCTIONS
Discussed anatomy and pathophysiology of carpal tunnel. Discussed avoidance of exacerbating activities and use of braces.  Also discussed formal occupational hand therapy, consideration of injections. Patient would like to discuss options with orthopedic surgery.    For wrist arthritis - We discussed supportive care of bracing and OTC medications, role of occupational hand therapy, consideration of injections and surgical referral.    Plan:  - Today's Plan of Care:  Referral to an Orthopedic Surgeon to discuss options for carpal tunnel    Discussed activity considerations and other supportive care including Ice/Heat, OTC and other topical medications as needed.  Diclofenac/Voltaren Gel    Discussed bracing options    -We also discussed other future treatment options:  Referral to Occupational Hand Therapy  Consideration of US guided injections (carpal tunnel v. CMC joint)    Follow Up: as needed    If you have any further questions for your physician or physician s care team you can call [phone] and use option 3 to leave a voice message.

## 2024-04-12 NOTE — LETTER
4/12/2024         RE: Dustin Shah  [address]  [place] [address]        Dear Colleague,    Thank you for referring your patient, Dustin Shah, to the [facility] [place]. Please see a copy of my visit note below.    ASSESSMENT & PLAN    Dustin was seen today for pain, numbness, numbness and pain.    Diagnoses and all orders for this visit:    Wrist arthritis    Bilateral carpal tunnel syndrome  -     Orthopedic  Referral  -     XR Hand Bilateral G/E 3 Views; Future  -     Orthopedic  Referral; Future      This issue is chronic and Worsening.        ICD-10-CM    1. Wrist arthritis  M19.039       2. Bilateral carpal tunnel syndrome  G56.03 Orthopedic  Referral     XR Hand Bilateral G/E 3 Views     Orthopedic  Referral        Patient Instructions   Discussed anatomy and pathophysiology of carpal tunnel. Discussed avoidance of exacerbating activities and use of braces.  Also discussed formal occupational hand therapy, consideration of injections. Patient would like to discuss options with orthopedic surgery.    For wrist arthritis - We discussed supportive care of bracing and OTC medications, role of occupational hand therapy, consideration of injections and surgical referral.    Plan:  - Today's Plan of Care:  Referral to an Orthopedic Surgeon to discuss options for carpal tunnel    Discussed activity considerations and other supportive care including Ice/Heat, OTC and other topical medications as needed.  Diclofenac/Voltaren Gel    Discussed bracing options    -We also discussed other future treatment options:  Referral to Occupational Hand Therapy  Consideration of US guided injections (carpal tunnel v. CMC joint)    Follow Up: as needed    Concerning signs and symptoms were reviewed and all questions were answered at this time.    Thanks for the opportunity to participate in the care of this patient, I will keep you updated on their  progress.    CC: Kirill Gillette MD [facility]  Sports Medicine Physician  [facility]    -----  Chief Complaint   Patient presents with     Right Hand - Pain, Numbness     Left Hand - Numbness, Pain       SUBJECTIVE  Dustin Shah is a/an 68 year old male who is seen in consultation at the request of  Kirill Hauser M.D. for evaluation of EDUARDO hand/wrist pain.     The patient is seen by themselves.  The patient is Left handed    Onset: 10 years(s) ago. Reports insidious onset without acute precipitating event.  Location of Pain: bilateral hand/wrist, pain along with numbness and tingling  Worsened by: grabbing, twisting, pulling, wrist flexion  Better with: moving/shaking hands   Treatments tried: casting/splinting/bracing, EMG 2/16/2021  Associated symptoms: numbness, tingling, weakness of EDUARDO hands, and feeling of instability    Orthopedic/Surgical history: YES - Date: chronic EDUARDO hand pain, previously dx with carpal tunnel, had EMG previously (see below)    Social History/Occupation:  previously, now retired    Discussed with PCP at a recent visit who referred to our clinic      REVIEW OF SYSTEMS:  Review of Systems    OBJECTIVE:  There were no vitals taken for this visit.   General: healthy, alert and in no distress  Skin: no suspicious lesions or rash.  CV: distal perfusion intact   Resp: normal respiratory effort without conversational dyspnea   Psych: normal mood and affect  Gait: NORMAL  Neuro: Normal light sensory exam of upper extremity    Bilateral Wrist and Hand exam    Inspection:       Arthritis deformities bilaterally    Tender:       Mild volar wrist    Non Tender:       Remainder of the Wrist and Hand bilateral    ROM:       Slightly decreased ROM bilateral wrists    Strength:       5/5 strength in the muscles of the hand, wrist and forearm bilateral    Special Tests:        neg (-) Tinel's test bilateral and       positive (+) Phalen's  test bilateral    Neurovascular:       2+ radial pulses bilaterally with brisk capillary refill and      normal sensation to light touch in the radial, median and ulnar nerve distributions    RADIOLOGY:  Final results and radiologist's interpretation, available in the [facility] health record.  Images were reviewed with the patient in the office today.  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  Probable mild right median neuropathy at the wrist.  Possible mild left ulnar neuropathy at the elbow.     Review of prior external note(s) from - PCP  Review of the result(s) of each unique test - XR and EMG             Again, thank you for allowing me to participate in the care of your patient.        Sincerely,        Lucila Gillette MD

## 2024-04-16 DIAGNOSIS — E78.2 MIXED HYPERLIPIDEMIA: ICD-10-CM

## 2024-04-16 RX ORDER — ROSUVASTATIN CALCIUM 10 MG/1
TABLET, COATED ORAL
Qty: 90 TABLET | Refills: 3 | Status: SHIPPED | OUTPATIENT
Start: 2024-04-16

## 2024-04-16 NOTE — PROGRESS NOTES
CHIEF COMPLAINT:   Chief Complaint   Patient presents with    Cts     B UE/chronic condition/ EMG 2021 + for L mod and R mild CTS . Tx: B wrist braces. Soc: worked as  40 + yrs.         Dustin Shah is seen today in the [facility] for evaluation of bilateral hand pain, numbness and tingling  at the request of Dr. Lucila Gillette       HISTORY:  Dustin Shah is a 68 year old male , Left -hand dominant who is seen in for Bilateral hand numbness and tingling, pain, and weakness x  years.  The symptoms have been constant, and helped by braces at night.    he has numbness and tingling in all digits both hands, but mainly the long/ring/small fingers especially when not wearing the braces.  Other symptoms include pain up arm.  Pain with using the hands, driving, etc.    Treatment with bilateral braces. Topical voltaren.    Retired  of 40+ years.    He does have a sore neck, but wearing the wrist braces do seem to help with the neck pain.    Has COPD.    Suspected cause: Due to unknown factors.    Pain severity: 3/10  Pain quality: dull, aching, and stabbing  Frequency of symptoms: frequently.  Aggravating Factors: activities..  Relieving Factors: wrist braces.  Previous modalities tried: braces, topicals    Usual level of work activity: retired.      Other PMH:  has a past medical history of Bicuspid aortic valve, COPD (chronic obstructive pulmonary disease) (H) (10/2015), Hyperlipidemia (10/2015), and Hypertension (10/2015).    He has no past medical history of Arthritis, Cancer (H), Cerebral infarction (H), Congestive heart failure (H), Depressive disorder, Diabetes (H), Motion sickness, Obese, PONV (postoperative nausea and vomiting), Thyroid disease, or Uncomplicated asthma.  Patient Active Problem List   Diagnosis    HL (hearing loss)    COPD (chronic obstructive pulmonary disease) (H)    CARDIOVASCULAR SCREENING; LDL GOAL LESS THAN 130    Benign essential hypertension     Former smoker    Undiagnosed cardiac murmurs    Hyperlipidemia with target LDL less than 100    Status post coronary angiogram    Stage 1 mild COPD by GOLD classification (H)    Advanced directives, counseling/discussion    Tubular adenoma of colon    Bicuspid aortic valve    Overweight (BMI 25.0-29.9)    Ascending aorta dilatation (H24)       Surgical Hx:  has a past surgical history that includes colonoscopy; hernia repair; ENT surgery; Colonoscopy (N/A, 2/14/2020); Abdomen surgery; and orthopedic surgery (feb 2017).    Medications:   Current Outpatient Medications:     albuterol (PROAIR HFA/PROVENTIL HFA/VENTOLIN HFA) 108 (90 Base) MCG/ACT inhaler, Inhale 2 puffs into the lungs every 4 hours as needed for shortness of breath or wheezing, Disp: 18 g, Rfl: 3    aspirin 81 MG tablet, Take by mouth daily, Disp: 30 tablet, Rfl:     calcium carbonate-vitamin D 500-400 MG-UNIT TABS tablt, Take 1 tablet by mouth daily, Disp: 180 tablet, Rfl: 3    fluticasone-salmeterol (ADVAIR) 250-50 MCG/ACT inhaler, Inhale 1 puff into the lungs 2 times daily, Disp: 180 each, Rfl: 3    lisinopril (ZESTRIL) 20 MG tablet, Take 1 tablet (20 mg) by mouth daily, Disp: 90 tablet, Rfl: 3    omega 3 1000 MG CAPS, Take 1 g by mouth daily, Disp: 90 capsule, Rfl:     rosuvastatin (CRESTOR) 10 MG tablet, Take 1 tablet (10 mg) by mouth daily. needs labs., Disp: 90 tablet, Rfl: 1    Allergies:   Allergies   Allergen Reactions    Nka [No Known Allergies]        Social Hx: retired.  reports that he quit smoking about 10 years ago. His smoking use included cigarettes. He started smoking about 50 years ago. He has a 40 pack-year smoking history. He has never used smokeless tobacco. He reports current alcohol use. He reports that he does not use drugs.    Family Hx: family history includes Diabetes in his mother; Heart Failure in his maternal half-brother and mother; Peripheral Vascular Disease in his mother..    REVIEW OF SYSTEMS: 10 point ROS neg  other than the symptoms noted above in the HPI and PMH. Notables include  CONSTITUTIONAL:NEGATIVE for fever, chills, change in weight  INTEGUMENTARY/SKIN: NEGATIVE for worrisome rashes, moles or lesions  MUSCULOSKELETAL:See HPI above  Neurology: see HPI above.      EXAM:  /74 (BP Location: Left arm, Patient Position: Sitting, Cuff Size: Adult Regular)   Pulse 64   SpO2 98%   GENERAL APPEARANCE: healthy, alert and no distress   GAIT: NORMAL  SKIN: no suspicious lesions or rashes  RESPIRATORY: No increased work of breathing.  NEURO:     strength: decreased,    thenar fasiculations: negative    Thenar atrophy:Mild.    Sensation intact in  all digits,    reflexes normal in upper extremities.   PSYCH:  mentation appears normal and affect normal, not anxious.    MUSCULOSKELETAL:    RIGHT HAND/FINGERS:    Skin intact. No abnormal skin discoloration, erythema or ecchymosis.   No nail pitting or clubbing.  Increased wear pattern, otherwise ormal color and tone.  Degenerative changes noted of the wrist, thumb, fingers.    There is mild swelling in the wrist, hand, forearm.  There is moderate tenderness in the wrist, thenar eminence, carpometacarpal joint of the thumb  There is no ecchymosis.  There is no erythema of the surrounding skin.  There is no maceration of the skin.  There is no gross deformity in the area.  Intact extensors. No extensor lag.    Special tests wrist:    Tinel's equivocal, ulnarly   Phalen's positive. Ulnar 3 digits   Flexion/compression test positive. Ulnar 3 digits   Finkelstein's test: negative.   Ulnar piano sign: negative   Ulnar foveal tenderness:  negative    Special tests medial elbow ulnar nerve:    Tinel's negative,    Flexion/compression test negative.    Special tests median nerve proximal forearm:    Tinel's negative.    1st carpometacarpal grind: positive     Intact sensation light touch median, radial, ulnar nerves of the hand  Intact sensation to the radial and ulnar digital  nerves of the fingers, as well as the finger tips.  Intact epl fpl fdp edc wrist flexion/extension biceps/triceps deltoid  Brisk capillary refill to all fingers.   Palpable radial pulse, 2+.      LEFT HAND/FINGERS:    Skin intact. No abnormal skin discoloration, erythema or ecchymosis.   No nail pitting or clubbing.  Increased wear pattern, otherwise ormal color and tone.  Degenerative changes noted of the wrist, thumb, fingers.    There is mild swelling in the wrist, hand, forearm.  There is moderate tenderness in the wrist, thenar eminence, carpometacarpal joint of the thumb  There is no ecchymosis.  There is no erythema of the surrounding skin.  There is no maceration of the skin.  There is no gross deformity in the area.  Intact extensors. No extensor lag.    Special tests wrist:    Tinel's equivocal, ulnarly   Phalen's positive. Ulnar 3 digits   Flexion/compression test positive. Ulnar 3 digits   Finkelstein's test: negative.   Ulnar piano sign: negative   Ulnar foveal tenderness:  negative    Special tests medial elbow ulnar nerve:    Tinel's negative,    Flexion/compression test negative.    Special tests median nerve proximal forearm:    Tinel's negative.    1st carpometacarpal grind: positive     Intact sensation light touch median, radial, ulnar nerves of the hand  Intact sensation to the radial and ulnar digital nerves of the fingers, as well as the finger tips.  Intact epl fpl fdp edc wrist flexion/extension biceps/triceps deltoid  Brisk capillary refill to all fingers.   Palpable radial pulse, 2+.        XRAYS: bilateral hand 4/12/2024  Scattered degenerative changes involving both hands which  is severe at the bilateral thumb CMC joints. Severe arthrosis DIP  joint of the right small finger. Severe left radiocarpal arthrosis  with small loose body in left wrist. There is remodeling of the  proximal scaphoid pole which is nonspecific. An age-indeterminate  fracture is not excluded and dedicated left  wrist radiographs are  recommended.  There is no evidence of an acute fracture involving either hand.   Atherosclerotic vascular calcifications..        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  Probable mild right median neuropathy at the wrist.  Possible mild left ulnar neuropathy at the elbow.       ASSESSMENT/PLAN: 67yo LHD male with:  1) bilateral hand pain, numbness and tingling, carpal tunnel syndrome, possible cubital tunnel  2) bilateral thumb and wrist osteoarthritis..    * discussed with patient signs and symptoms somewhat consistent with carpal tunnel syndrome. Carpal tunnel syndrome is compression or pinching of the median nerve at the wrist, as it enters the hand. There are many different causes, and in most cases, multifactorial.  Usually affects the thumb, index, long fingers, not usually the small finger. In his case, seems to affect more the long, ring, small fingers. So could have some nerve overlap.    * An indepth discussion was had with him about the options for treatment, which included activity modification to avoid aggravating activities, taking breaks during activities that cause symptoms, stretching, NSAIDS to help decrease inflammation and swelling within the carpal tunnel, night splinting, corticosteroid injections, and carpal tunnel release.   * depending upon severity and duration of symptoms, nonoperative treatment is usually initiated, starting with least invasive modalities such as activity modification and a trial of night splints and NSAIDs.  * Cortisone injections are considered to decrease swelling and inflammation within the carpal tunnel and compression of the nerve.   * Lastly, carpal tunnel release should symptoms persist despite trial of nonoperative treatment, or in cases of severe carpal tunnel syndrome.  * risks of surgery, including, but not  limited to: bleeding, infection, pain, scar, damage to adjacent structures (nerves, vessels, tendons), temporary versus permanent nerve injury, failure to relieve symptoms, recurrence of symptoms, incomplete release, stiffness, scar sensitivity and tenderness, need for further surgery, risks of anesthesia were discussed.  * in some cases, with severe, prolonged symptoms, or in situations of underlying peripheral neuropathy, there may be permanent nerve changes not amenable to surgery, that even with surgery, may not resolve.  * in some cases, it may take 6 months to a year or longer for symptoms to improve or resolve, but even then may not completely resolve.    * plan: staged bilateral carpal tunnel release, starting on the left followed by the right 3 weeks later. Local anesthetic only.  No H+P needed if local anesthetic only  Return to clinic 2 weeks postoperative for wound check, suture removal, sooner if needed.      * All questions were addressed and answered prior to discharge from clinic today. The patient acknowledges an understanding of and agreement with the plan set forth during today's visit. Patient was advised to call our office or MyChart us if any further questions arise upon leaving our office today.        Michael Alejandro M.D., M.S.  Dept. of Orthopaedic Surgery  [facility]

## 2024-04-17 ENCOUNTER — TELEPHONE (OUTPATIENT)
Dept: SURGERY | Facility: CLINIC | Age: 69
End: 2024-04-17

## 2024-04-17 ENCOUNTER — OFFICE VISIT (OUTPATIENT)
Dept: ORTHOPEDICS | Facility: CLINIC | Age: 69
End: 2024-04-17
Attending: PEDIATRICS
Payer: COMMERCIAL

## 2024-04-17 VITALS — OXYGEN SATURATION: 98 % | DIASTOLIC BLOOD PRESSURE: 74 MMHG | SYSTOLIC BLOOD PRESSURE: 132 MMHG | HEART RATE: 64 BPM

## 2024-04-17 DIAGNOSIS — G56.03 BILATERAL CARPAL TUNNEL SYNDROME: ICD-10-CM

## 2024-04-17 PROCEDURE — 99203 OFFICE O/P NEW LOW 30 MIN: CPT | Performed by: ORTHOPAEDIC SURGERY

## 2024-04-17 ASSESSMENT — PAIN SCALES - GENERAL: PAINLEVEL: MILD PAIN (3)

## 2024-04-17 NOTE — LETTER
4/17/2024         RE: Dustin Shah  [address]  [place] [address]        Dear Colleague,    Thank you for referring your patient, Dustin Shah, to the [facility] [place]. Please see a copy of my visit note below.    CHIEF COMPLAINT:   Chief Complaint   Patient presents with     Cts     B UE/chronic condition/ EMG 2021 + for L mod and R mild CTS . Tx: B wrist braces. Soc: worked as  40 + yrs.         Dustin Shah is seen today in the [facility] for evaluation of bilateral hand pain, numbness and tingling  at the request of Dr. Luicla Gillette       HISTORY:  Dustin Shah is a 68 year old male , Left -hand dominant who is seen in for Bilateral hand numbness and tingling, pain, and weakness x  years.  The symptoms have been constant, and helped by braces at night.    he has numbness and tingling in all digits both hands, but mainly the long/ring/small fingers especially when not wearing the braces.  Other symptoms include pain up arm.  Pain with using the hands, driving, etc.    Treatment with bilateral braces. Topical voltaren.    Retired  of 40+ years.    He does have a sore neck, but wearing the wrist braces do seem to help with the neck pain.    Has COPD.    Suspected cause: Due to unknown factors.    Pain severity: 3/10  Pain quality: dull, aching, and stabbing  Frequency of symptoms: frequently.  Aggravating Factors: activities..  Relieving Factors: wrist braces.  Previous modalities tried: braces, topicals    Usual level of work activity: retired.      Other PMH:  has a past medical history of Bicuspid aortic valve, COPD (chronic obstructive pulmonary disease) (H) (10/2015), Hyperlipidemia (10/2015), and Hypertension (10/2015).    He has no past medical history of Arthritis, Cancer (H), Cerebral infarction (H), Congestive heart failure (H), Depressive disorder, Diabetes (H), Motion sickness, Obese, PONV (postoperative nausea and  vomiting), Thyroid disease, or Uncomplicated asthma.  Patient Active Problem List   Diagnosis     HL (hearing loss)     COPD (chronic obstructive pulmonary disease) (H)     CARDIOVASCULAR SCREENING; LDL GOAL LESS THAN 130     Benign essential hypertension     Former smoker     Undiagnosed cardiac murmurs     Hyperlipidemia with target LDL less than 100     Status post coronary angiogram     Stage 1 mild COPD by GOLD classification (H)     Advanced directives, counseling/discussion     Tubular adenoma of colon     Bicuspid aortic valve     Overweight (BMI 25.0-29.9)     Ascending aorta dilatation (H24)       Surgical Hx:  has a past surgical history that includes colonoscopy; hernia repair; ENT surgery; Colonoscopy (N/A, 2/14/2020); Abdomen surgery; and orthopedic surgery (feb 2017).    Medications:   Current Outpatient Medications:      albuterol (PROAIR HFA/PROVENTIL HFA/VENTOLIN HFA) 108 (90 Base) MCG/ACT inhaler, Inhale 2 puffs into the lungs every 4 hours as needed for shortness of breath or wheezing, Disp: 18 g, Rfl: 3     aspirin 81 MG tablet, Take by mouth daily, Disp: 30 tablet, Rfl:      calcium carbonate-vitamin D 500-400 MG-UNIT TABS tablt, Take 1 tablet by mouth daily, Disp: 180 tablet, Rfl: 3     fluticasone-salmeterol (ADVAIR) 250-50 MCG/ACT inhaler, Inhale 1 puff into the lungs 2 times daily, Disp: 180 each, Rfl: 3     lisinopril (ZESTRIL) 20 MG tablet, Take 1 tablet (20 mg) by mouth daily, Disp: 90 tablet, Rfl: 3     omega 3 1000 MG CAPS, Take 1 g by mouth daily, Disp: 90 capsule, Rfl:      rosuvastatin (CRESTOR) 10 MG tablet, Take 1 tablet (10 mg) by mouth daily. needs labs., Disp: 90 tablet, Rfl: 1    Allergies:   Allergies   Allergen Reactions     Nka [No Known Allergies]        Social Hx: retired.  reports that he quit smoking about 10 years ago. His smoking use included cigarettes. He started smoking about 50 years ago. He has a 40 pack-year smoking history. He has never used smokeless tobacco.  He reports current alcohol use. He reports that he does not use drugs.    Family Hx: family history includes Diabetes in his mother; Heart Failure in his maternal half-brother and mother; Peripheral Vascular Disease in his mother..    REVIEW OF SYSTEMS: 10 point ROS neg other than the symptoms noted above in the HPI and PMH. Notables include  CONSTITUTIONAL:NEGATIVE for fever, chills, change in weight  INTEGUMENTARY/SKIN: NEGATIVE for worrisome rashes, moles or lesions  MUSCULOSKELETAL:See HPI above  Neurology: see HPI above.      EXAM:  /74 (BP Location: Left arm, Patient Position: Sitting, Cuff Size: Adult Regular)   Pulse 64   SpO2 98%   GENERAL APPEARANCE: healthy, alert and no distress   GAIT: NORMAL  SKIN: no suspicious lesions or rashes  RESPIRATORY: No increased work of breathing.  NEURO:     strength: decreased,    thenar fasiculations: negative    Thenar atrophy:Mild.    Sensation intact in  all digits,    reflexes normal in upper extremities.   PSYCH:  mentation appears normal and affect normal, not anxious.    MUSCULOSKELETAL:    RIGHT HAND/FINGERS:    Skin intact. No abnormal skin discoloration, erythema or ecchymosis.   No nail pitting or clubbing.  Increased wear pattern, otherwise ormal color and tone.  Degenerative changes noted of the wrist, thumb, fingers.    There is mild swelling in the wrist, hand, forearm.  There is moderate tenderness in the wrist, thenar eminence, carpometacarpal joint of the thumb  There is no ecchymosis.  There is no erythema of the surrounding skin.  There is no maceration of the skin.  There is no gross deformity in the area.  Intact extensors. No extensor lag.    Special tests wrist:    Tinel's equivocal, ulnarly   Phalen's positive. Ulnar 3 digits   Flexion/compression test positive. Ulnar 3 digits   Finkelstein's test: negative.   Ulnar piano sign: negative   Ulnar foveal tenderness:  negative    Special tests medial elbow ulnar nerve:    Tinel's negative,     Flexion/compression test negative.    Special tests median nerve proximal forearm:    Tinel's negative.    1st carpometacarpal grind: positive     Intact sensation light touch median, radial, ulnar nerves of the hand  Intact sensation to the radial and ulnar digital nerves of the fingers, as well as the finger tips.  Intact epl fpl fdp edc wrist flexion/extension biceps/triceps deltoid  Brisk capillary refill to all fingers.   Palpable radial pulse, 2+.      LEFT HAND/FINGERS:    Skin intact. No abnormal skin discoloration, erythema or ecchymosis.   No nail pitting or clubbing.  Increased wear pattern, otherwise ormal color and tone.  Degenerative changes noted of the wrist, thumb, fingers.    There is mild swelling in the wrist, hand, forearm.  There is moderate tenderness in the wrist, thenar eminence, carpometacarpal joint of the thumb  There is no ecchymosis.  There is no erythema of the surrounding skin.  There is no maceration of the skin.  There is no gross deformity in the area.  Intact extensors. No extensor lag.    Special tests wrist:    Tinel's equivocal, ulnarly   Phalen's positive. Ulnar 3 digits   Flexion/compression test positive. Ulnar 3 digits   Finkelstein's test: negative.   Ulnar piano sign: negative   Ulnar foveal tenderness:  negative    Special tests medial elbow ulnar nerve:    Tinel's negative,    Flexion/compression test negative.    Special tests median nerve proximal forearm:    Tinel's negative.    1st carpometacarpal grind: positive     Intact sensation light touch median, radial, ulnar nerves of the hand  Intact sensation to the radial and ulnar digital nerves of the fingers, as well as the finger tips.  Intact epl fpl fdp edc wrist flexion/extension biceps/triceps deltoid  Brisk capillary refill to all fingers.   Palpable radial pulse, 2+.        XRAYS: bilateral hand 4/12/2024  Scattered degenerative changes involving both hands which  is severe at the bilateral thumb CMC joints.  Severe arthrosis DIP  joint of the right small finger. Severe left radiocarpal arthrosis  with small loose body in left wrist. There is remodeling of the  proximal scaphoid pole which is nonspecific. An age-indeterminate  fracture is not excluded and dedicated left wrist radiographs are  recommended.  There is no evidence of an acute fracture involving either hand.   Atherosclerotic vascular calcifications..        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  Probable mild right median neuropathy at the wrist.  Possible mild left ulnar neuropathy at the elbow.       ASSESSMENT/PLAN: 69yo LHD male with:  1) bilateral hand pain, numbness and tingling, carpal tunnel syndrome, possible cubital tunnel  2) bilateral thumb and wrist osteoarthritis..    * discussed with patient signs and symptoms somewhat consistent with carpal tunnel syndrome. Carpal tunnel syndrome is compression or pinching of the median nerve at the wrist, as it enters the hand. There are many different causes, and in most cases, multifactorial.  Usually affects the thumb, index, long fingers, not usually the small finger. In his case, seems to affect more the long, ring, small fingers. So could have some nerve overlap.    * An indepth discussion was had with him about the options for treatment, which included activity modification to avoid aggravating activities, taking breaks during activities that cause symptoms, stretching, NSAIDS to help decrease inflammation and swelling within the carpal tunnel, night splinting, corticosteroid injections, and carpal tunnel release.   * depending upon severity and duration of symptoms, nonoperative treatment is usually initiated, starting with least invasive modalities such as activity modification and a trial of night splints and NSAIDs.  * Cortisone injections are considered to decrease  swelling and inflammation within the carpal tunnel and compression of the nerve.   * Lastly, carpal tunnel release should symptoms persist despite trial of nonoperative treatment, or in cases of severe carpal tunnel syndrome.  * risks of surgery, including, but not limited to: bleeding, infection, pain, scar, damage to adjacent structures (nerves, vessels, tendons), temporary versus permanent nerve injury, failure to relieve symptoms, recurrence of symptoms, incomplete release, stiffness, scar sensitivity and tenderness, need for further surgery, risks of anesthesia were discussed.  * in some cases, with severe, prolonged symptoms, or in situations of underlying peripheral neuropathy, there may be permanent nerve changes not amenable to surgery, that even with surgery, may not resolve.  * in some cases, it may take 6 months to a year or longer for symptoms to improve or resolve, but even then may not completely resolve.    * plan: staged bilateral carpal tunnel release, starting on the left followed by the right 3 weeks later. Local anesthetic only.  No H+P needed if local anesthetic only  Return to clinic 2 weeks postoperative for wound check, suture removal, sooner if needed.      * All questions were addressed and answered prior to discharge from clinic today. The patient acknowledges an understanding of and agreement with the plan set forth during today's visit. Patient was advised to call our office or MyChart us if any further questions arise upon leaving our office today.        Michael Alejandro M.D., M.S.  Dept. of Orthopaedic Surgery  [facility]      Again, thank you for allowing me to participate in the care of your patient.        Sincerely,        Michael Alejandro MD

## 2024-04-18 NOTE — TELEPHONE ENCOUNTER
Type of surgery: RELEASE, rightCARPAL TUNNEL (Right)   Location of surgery: [place] [facility]  Date and time of surgery: 06/04/2024  Surgeon: Javon   Pre-Op Appt Date: local   Post-Op Appt Date: 06/19/2024   Packet sent out: Yes  Pre-cert/Authorization completed:  No  Date:

## 2024-04-18 NOTE — TELEPHONE ENCOUNTER
Type of surgery: RELEASE, rightCARPAL TUNNEL left   Location of surgery: [place] [facility]  Date and time of surgery: 05/14/2024  Surgeon: Javon   Pre-Op Appt Date: local   Post-Op Appt Date: 05/29/2024   Packet sent out: Yes  Pre-cert/Authorization completed:  No  Date:

## 2024-05-13 ENCOUNTER — ANESTHESIA EVENT (OUTPATIENT)
Dept: SURGERY | Facility: CLINIC | Age: 69
End: 2024-05-13
Payer: COMMERCIAL

## 2024-05-13 ASSESSMENT — COPD QUESTIONNAIRES: COPD: 1

## 2024-05-13 ASSESSMENT — LIFESTYLE VARIABLES: TOBACCO_USE: 1

## 2024-05-13 NOTE — ANESTHESIA PREPROCEDURE EVALUATION
Anesthesia Pre-Procedure Evaluation    Patient: Dustin Shah   MRN: [id number] : 1955        Procedure : Procedure(s):  RELEASE, left CARPAL TUNNEL          Past Medical History:   Diagnosis Date    Bicuspid aortic valve     COPD (chronic obstructive pulmonary disease) (H) 10/2015    CTS (carpal tunnel syndrome)     B CTS  + EMG     Hyperlipidemia 10/2015    Hypertension 10/2015      Past Surgical History:   Procedure Laterality Date    ABDOMEN SURGERY      COLONOSCOPY      COLONOSCOPY N/A 2020    Procedure: COLONOSCOPY, WITH POLYPECTOMY AND BIOPSY;  Surgeon: Emerson Meyer MD;  Location: [facility]    ENT SURGERY      Tonsels    HERNIA REPAIR      ORTHOPEDIC SURGERY  2017    broken ankle      Allergies   Allergen Reactions    Nka [No Known Allergies]       Social History     Tobacco Use    Smoking status: Former     Current packs/day: 0.00     Average packs/day: 1 pack/day for 40.0 years (40.0 ttl pk-yrs)     Types: Cigarettes     Start date: 1973     Quit date: 2013     Years since quitting: 10.4    Smokeless tobacco: Never   Substance Use Topics    Alcohol use: Yes     Comment: social      Wt Readings from Last 1 Encounters:   24 78.7 kg (173 lb 9.6 oz)        Anesthesia Evaluation   Pt has had prior anesthetic. Type: General and MAC.        ROS/MED HX  ENT/Pulmonary:     (+)                tobacco use, Past use,  40  Pack-Year Hx,        COPD,              Neurologic:       Cardiovascular: Comment: Ascending aorta dilatation     (+) Dyslipidemia hypertension- -   -  - -                           valvular problems/murmurs  Bicuspid aortic valve.    Previous cardiac testing   Echo: Date: 24 Results:  Left ventricular systolic function is normal.  The visual ejection fraction is 55-60%.  No regional wall motion abnormalities noted.  Moderate valvular aortic stenosis.  The mean AoV pressure gradient is 24mmHg.  Ascending aorta measures 3.9cm.  Prior echo from  revealed mean  "gradient across aortic valve of 20mm.  Ascending aorta size is unchanged. The study was technically adequate    Stress Test:  Date: Results:    ECG Reviewed:  Date: Results:    Cath:  Date: Results:      METS/Exercise Tolerance:     Hematologic:       Musculoskeletal:       GI/Hepatic:       Renal/Genitourinary:       Endo:       Psychiatric/Substance Use:       Infectious Disease:       Malignancy:   (+) Malignancy, History of GI.    Other:               OUTSIDE LABS:  CBC:   Lab Results   Component Value Date    WBC 9.6 11/09/2015    WBC 10.2 09/08/2008    HGB 16.2 11/09/2015    HGB 14.7 11/04/2015    HCT 47.9 11/09/2015    HCT 47.5 09/08/2008     11/09/2015     09/08/2008     BMP:   Lab Results   Component Value Date     08/22/2023     02/22/2022    POTASSIUM 4.6 08/22/2023    POTASSIUM 4.5 02/22/2022    CHLORIDE 103 08/22/2023    CHLORIDE 103 02/22/2022    CO2 26 08/22/2023    CO2 29 02/22/2022    BUN 16.5 08/22/2023    BUN 18 02/22/2022    CR 0.93 08/22/2023    CR 0.94 02/22/2022    GLC 98 08/22/2023    GLC 97 02/22/2022     COAGS: No results found for: \"PTT\", \"INR\", \"FIBR\"  POC: No results found for: \"BGM\", \"HCG\", \"HCGS\"  HEPATIC:   Lab Results   Component Value Date    ALBUMIN 4.8 (H) 09/08/2008    PROTTOTAL 8.0 09/08/2008    ALT 24 09/08/2008    AST 30 09/08/2008    ALKPHOS 83 09/08/2008    BILITOTAL 0.3 09/08/2008     OTHER:   Lab Results   Component Value Date    A1C 5.6 12/02/2013    OTTO 9.7 08/22/2023    TSH 1.17 10/30/2015              JAMISON Burgess CRNA    I have reviewed the pertinent notes and labs in the chart from the past 30 days and (re)examined the patient.  Any updates or changes from those notes are reflected in this note.                  "

## 2024-05-14 ENCOUNTER — ANESTHESIA (OUTPATIENT)
Dept: SURGERY | Facility: CLINIC | Age: 69
End: 2024-05-14
Payer: COMMERCIAL

## 2024-05-14 ENCOUNTER — HOSPITAL ENCOUNTER (OUTPATIENT)
Facility: CLINIC | Age: 69
Discharge: HOME | End: 2024-05-14
Attending: ORTHOPAEDIC SURGERY | Admitting: ORTHOPAEDIC SURGERY
Payer: COMMERCIAL

## 2024-05-14 VITALS
WEIGHT: 173 LBS | HEIGHT: 65 IN | SYSTOLIC BLOOD PRESSURE: 124 MMHG | TEMPERATURE: 98.3 F | HEART RATE: 72 BPM | DIASTOLIC BLOOD PRESSURE: 75 MMHG | BODY MASS INDEX: 28.82 KG/M2 | OXYGEN SATURATION: 92 % | RESPIRATION RATE: 17 BRPM

## 2024-05-14 DIAGNOSIS — G56.02 LEFT CARPAL TUNNEL SYNDROME: Primary | ICD-10-CM

## 2024-05-14 PROCEDURE — 250N000009 HC RX 250: Performed by: ORTHOPAEDIC SURGERY

## 2024-05-14 PROCEDURE — 250N000011 HC RX IP 250 OP 636: Performed by: ORTHOPAEDIC SURGERY

## 2024-05-14 PROCEDURE — 999N000141 HC STATISTIC PRE-PROCEDURE NURSING ASSESSMENT: Performed by: ORTHOPAEDIC SURGERY

## 2024-05-14 PROCEDURE — 64721 CARPAL TUNNEL SURGERY: CPT | Mod: LT | Performed by: ORTHOPAEDIC SURGERY

## 2024-05-14 PROCEDURE — 360N000075 HC SURGERY LEVEL 2, PER MIN: Performed by: ORTHOPAEDIC SURGERY

## 2024-05-14 PROCEDURE — 272N000001 HC OR GENERAL SUPPLY STERILE: Performed by: ORTHOPAEDIC SURGERY

## 2024-05-14 PROCEDURE — 710N000012 HC RECOVERY PHASE 2, PER MINUTE: Performed by: ORTHOPAEDIC SURGERY

## 2024-05-14 RX ORDER — BUPIVACAINE HYDROCHLORIDE 2.5 MG/ML
INJECTION, SOLUTION INFILTRATION; PERINEURAL PRN
Status: DISCONTINUED | OUTPATIENT
Start: 2024-05-14 | End: 2024-05-14 | Stop reason: HOSPADM

## 2024-05-14 RX ORDER — HYDROCODONE BITARTRATE AND ACETAMINOPHEN 5; 325 MG/1; MG/1
1 TABLET ORAL EVERY 6 HOURS PRN
Qty: 8 TABLET | Refills: 0 | Status: SHIPPED | OUTPATIENT
Start: 2024-05-14 | End: 2024-06-19

## 2024-05-14 RX ORDER — LIDOCAINE HYDROCHLORIDE 10 MG/ML
INJECTION, SOLUTION INFILTRATION; PERINEURAL PRN
Status: DISCONTINUED | OUTPATIENT
Start: 2024-05-14 | End: 2024-05-14 | Stop reason: HOSPADM

## 2024-05-14 RX ORDER — HYDROXYZINE HYDROCHLORIDE 10 MG/1
10 TABLET, FILM COATED ORAL
Status: DISCONTINUED | OUTPATIENT
Start: 2024-05-14 | End: 2024-05-14 | Stop reason: HOSPADM

## 2024-05-14 RX ORDER — OXYCODONE HYDROCHLORIDE 5 MG/1
5 TABLET ORAL
Status: DISCONTINUED | OUTPATIENT
Start: 2024-05-14 | End: 2024-05-14 | Stop reason: HOSPADM

## 2024-05-14 RX ORDER — ONDANSETRON 4 MG/1
4 TABLET, ORALLY DISINTEGRATING ORAL
Status: DISCONTINUED | OUTPATIENT
Start: 2024-05-14 | End: 2024-05-14 | Stop reason: HOSPADM

## 2024-05-14 RX ORDER — AMOXICILLIN 250 MG
1-2 CAPSULE ORAL 2 TIMES DAILY
Qty: 30 TABLET | Refills: 0 | Status: SHIPPED | OUTPATIENT
Start: 2024-05-14 | End: 2024-05-28

## 2024-05-14 ASSESSMENT — ACTIVITIES OF DAILY LIVING (ADL)
ADLS_ACUITY_SCORE: 22

## 2024-05-14 NOTE — PROGRESS NOTES
[facility] DISCHARGE NOTE    Patient discharged to home at 8:04 AM via ambulation. Accompanied by spouse and staff. Discharge instructions reviewed with patient and spouse, opportunity offered to ask questions. Prescriptions sent to patients preferred pharmacy. All belongings sent with patient.  Patient received local anesthesia, no IV needed.     Lynnette James RN

## 2024-05-14 NOTE — DISCHARGE INSTRUCTIONS
Same Day Surgery Discharge Instructions  Special Precautions After Surgery - Adult    It is not unusual to feel lightheaded or faint, up to 24 hours after surgery or while taking pain medication.  If you have these symptoms; sit for a few minutes before standing and have someone assist you when getting up.  You should rest and relax for the next 24 hours and must have someone stay with you for at least 24 hours after your discharge.  DO NOT DRIVE any vehicle or operate mechanical equipment for 24 hours following the end of your surgery.  DO NOT DRIVE while taking narcotic pain medications that have been prescribed by your physician.  If you had a limb operated on, you must be able to use it fully to drive.  DO NOT drink alcoholic beverages for 24 hours following surgery or while taking prescription pain medication.  Drink clear liquids (apple juice, ginger ale, broth, 7-Up, etc.).  Progress to your regular diet as you feel able.  Any questions call your physician and do not make important decisions for 24 hours.    Nausea and Vomiting: Nausea and vomiting can occur any time after receiving anesthesia. If you experience nausea and vomiting we encourage you to move to a clear liquid diet and advance your diet as tolerated. If nausea and vomiting do not improve within 12 hours please call the surgeon or present to the Emergency department.     Break-through Bleeding: If your experience bleeding from your surgical site apply pressure and additional dressing per nurse instruction. For simple problems such as a saturated dressing, you may need to reinforce the dressing with more gauze and tape and put slight pressure on the site. If bleeding does not subside contact the surgeon or present to the Emergency Department.    Post-op Infection: If you develop a fever of 100.4 or greater, have pus like drainage, redness, swelling or severe pain at the surgical site not alleviated with pain medications; please  contact the surgeon or present to the Emergency Department.     Medications:  Follow the instructions on the bottle.  __________________________________________________________________________________________________________________________________  IMPORTANT NUMBERS:    [facility] Main Number:  [phone], [phone]  Pharmacy:  [phone]  Same Day Surgery:  [phone], for general post-op questions call Monday - Thursday until 8:30 p.m., Fridays until 6:00 p.m.  Nurse Advice Line:  [phone]                                                                         [facility]:  [phone] option 1  [facility]:  [phone]     [facility] Clinic:  [phone]   Surgery Specialty Clinic:  [phone]   Home Medical Equipment: [phone]  [facility]:  [phone]

## 2024-05-14 NOTE — H&P
The   patient's chart was personally reviewed today with the patient. there have been no interval changes in patient's history since our previous visit on 4/17/2024    History:  Dustin Shah is a 69 year old male , Left -hand dominant with Bilateral hand numbness and tingling, pain, and weakness x  years.  The symptoms have been constant, and helped by braces at night.    he has numbness and tingling in all digits both hands, but mainly the long/ring/small fingers especially when not wearing the braces.  Other symptoms include pain up arm.  Pain with using the hands, driving, etc.     Treatment with bilateral braces. Topical voltaren.     Retired  of 40+ years.     He does have a sore neck, but wearing the wrist braces do seem to help with the neck pain.     Has COPD.    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  Probable mild right median neuropathy at the wrist.  Possible mild left ulnar neuropathy at the elbow.         ASSESSMENT/PLAN: 70yo LHD male with:  1) bilateral hand pain, numbness and tingling, carpal tunnel syndrome, possible cubital tunnel  2) bilateral thumb and wrist osteoarthritis..     * discussed with patient signs and symptoms somewhat consistent with carpal tunnel syndrome. Carpal tunnel syndrome is compression or pinching of the median nerve at the wrist, as it enters the hand. There are many different causes, and in most cases, multifactorial.  Usually affects the thumb, index, long fingers, not usually the small finger. In his case, seems to affect more the long, ring, small fingers. So could have some nerve overlap.     * An indepth discussion was had with him about the options for treatment, which included activity modification to avoid aggravating activities, taking breaks during activities that cause symptoms, stretching, NSAIDS to help decrease inflammation  and swelling within the carpal tunnel, night splinting, corticosteroid injections, and carpal tunnel release.   * depending upon severity and duration of symptoms, nonoperative treatment is usually initiated, starting with least invasive modalities such as activity modification and a trial of night splints and NSAIDs.  * Cortisone injections are considered to decrease swelling and inflammation within the carpal tunnel and compression of the nerve.   * Lastly, carpal tunnel release should symptoms persist despite trial of nonoperative treatment, or in cases of severe carpal tunnel syndrome.  * risks of surgery, including, but not limited to: bleeding, infection, pain, scar, damage to adjacent structures (nerves, vessels, tendons), temporary versus permanent nerve injury, failure to relieve symptoms, recurrence of symptoms, incomplete release, stiffness, scar sensitivity and tenderness, need for further surgery, risks of anesthesia were discussed.  * in some cases, with severe, prolonged symptoms, or in situations of underlying peripheral neuropathy, there may be permanent nerve changes not amenable to surgery, that even with surgery, may not resolve.  * in some cases, it may take 6 months to a year or longer for symptoms to improve or resolve, but even then may not completely resolve.     * plan:   left  carpal tunnel release. Local anesthetic only.    Patient elects to proceed with planned procedure. Left carpal tunnel release.    Risks and perceived benefits of surgery again discussed with patient. Patient's questions addressed and answered. Written informed consent obtained and reviewed. Surgical site marked with indelible marker with patient's participation after confirming site with patient.      Michael Alejandro M.D., M.S.  Dept. of Orthopaedic Surgery  [facility]

## 2024-05-14 NOTE — OP NOTE
OPERATIVE REPORT    DATE OF SERVICE:  5/14/2024      PREOPERATIVE DIAGNOSIS  Left carpal tunnel syndrome.      POSTOPERATIVE DIAGNOSIS  Left carpal tunnel syndrome.      NAME OF OPERATION  Open left carpal tunnel release.     SURGEON  Michael Alejandro MD     FIRST ASSISTANT  Guy Robert PA-C     ANESTHESIA:   local.    ANTIBIOTICS: none, prior to incision    IVF: none. No iv.    ESTIMATED BLOOD LOSS: 1mL    FINDINGS: thick transverse carpal ligament, flattened median nerve, hyperemic changes of median nerve.    Tourniquet time: 7 minutes at 200 mmHg.    Complications: None apparent.    SPECIMENS: none    DRAINS: none    IMPLANTS: none      INDICATIONS  Dustin Shah is a 69 year old male with severe left carpal tunnel syndrome, confirmed on EMG. The symptoms have been quite bothersome.  Conservative treatment has failed, including night splints, therapy, NSAIDs and injections. Risks of surgery, including but not limited to: bleeding, infection, pain, scar, damage to adjacent structures (nerves, vessels), temporary vs permanent nerve damage, failure to relieve symptoms, recurrence of symptoms, need for further surgery, risks of anesthesia, and death were discussed. All questions were addressed to patient satisfaction. The patient elected to proceed with this surgery understanding the risks and perceived benefits. Informed consent was obtained for the procedure.       PROCEDURE IN DETAIL  The patient was identified in the preoperative holding area. The correct procedure and procedural site was confirmed with the patient. Consent was reviewed. The correct surgical site was marked with an indelible marker by the attending surgeon, Michael Alejandro MD.  The patient was then taken to the operating room and placed on the operating table in the supine position.  Tourniquet was placed around the proximal arm. All bony prominences well padded. The limb was prepped and draped in the usual sterile fashion.   Local anesthetic  using a combination of 0.25% Marcaine and 1% lidocaine was injected in the anticipated incision site.  The limb was exsanguinated and tourniquet inflated to 200 mmHg.    A longitudinal incision was made in the usual location at the base of the palm just ulnar to midline and the palmaris longus.  The incision was taken down through the skin and subcutaneous tissue carefully to the level of the palmar fascia.  The palmar fascia was then carefully incised, and the transverse carpal ligament was then exposed. The transverse carpal ligament was incised from distal to proximal under direct vision while protecting underlying structures with an elevator.  The transverse carpal ligament was tight and thick.  Proximally, we protected the underlying structures using a clamp, and then completed the ligament incision using a tenotomy scissors.  The distal volar forearm fascia was also incised longitudinally for a segment of approximately 3cm. The median nerve was completely exposed and was noted to have flattened, hyperemic appearance. Once I felt that the transverse carpal ligament was released completely, I used my small finger as a probe to make sure that it was in fact completely released, as it was. The wound was copiously irrigated with normal saline and the tourniquet deflated.  Hemostasis was achieved with electrocautery.  Then, 4-0 nylon sutures placed in a horizontal mattress fashion were used to close the skin, and a sterile dressing was applied followed by a volar splint. The patient was then transferred to the hospital cart and to the recovery area in stable condition. There were no apparent complications.    Postop plan will be partial weight-bearing of left upper extremity. Splint to be in place at all times until follow-up in 10-14 days. Oral pain medications (Norco) will be provided. Elevation of left upper extremity at all times to reduce swelling. Finger range of motion.    Michael Alejandro M.D., M.S.  Dept. of  Orthopaedic Surgery  [facility]

## 2024-05-21 NOTE — PROGRESS NOTES
Chief Complaint   Patient presents with    Left Wrist - Surgical Followup     Post op carpal tunnel release on 5/14/24. Patient has minimal numbness remaining. No pain today in the wrist. Sutures removed today.       SURGERY: left carpal tunnel release.  DATE OF SURGERY: 5/14/2024      HPI: Dustin Shah is a 69 year old male , who presents for post-surgical follow-up of a left carpal tunnel release.    It has now been 2 weeks. since surgery. He has remained in the splint. Denies fevers, chills or night sweats. There have been no wound problems. He has been doing active/passive finger range of motion exercises. He reports having mild pain/discomfort around the surgical site. He states that since surgery, preop symptoms have improved significantly.     He has right carpal tunnel release surgery scheduled for 6/6/2024.      PAST MEDICAL HISTORY:   Past Medical History:   Diagnosis Date    Bicuspid aortic valve     COPD (chronic obstructive pulmonary disease) (H) 10/2015    CTS (carpal tunnel syndrome)     B CTS  + EMG 2021    Hyperlipidemia 10/2015    Hypertension 10/2015       PAST SURGICAL HISTORY:   Past Surgical History:   Procedure Laterality Date    ABDOMEN SURGERY      COLONOSCOPY      COLONOSCOPY N/A 2/14/2020    Procedure: COLONOSCOPY, WITH POLYPECTOMY AND BIOPSY;  Surgeon: Emerson Meyer MD;  Location: [facility]    ENT SURGERY      Tonsels    HERNIA REPAIR      ORTHOPEDIC SURGERY  feb 2017    broken ankle    RELEASE CARPAL TUNNEL Left 5/14/2024    Procedure: RELEASE, left CARPAL TUNNEL;  Surgeon: Michael Alejandro MD;  Location: [facility]       MEDICATIONS: CMED@     ALLERGIES:   Allergies   Allergen Reactions    Nka [No Known Allergies]          PHYSICAL EXAM  GENERAL APPEARANCE: healthy, alert, no distress.   SKIN: no suspicious lesions or rashes  RESPIRATORY: No increased work of breathing.  NEURO: Normal strength and tone, mentation intact and speech normal  PSYCH:  mentation appears normal and affect  "normal/bright. Not anxious.        /76   Pulse 71   Ht 1.657 m (5' 5.25\")   Wt 81.2 kg (179 lb)   BMI 29.56 kg/m       HAND / WRIST EXAM:    The splint was removed.  Volar incision healing well with skin edges well approximated and everted.  Sutures in place.  No erythema. No drainage.    There is mild swelling in the palm, incisional area of the wrist.  There is mild tenderness in the palm, incisional area of the wrist.  There is resolving ecchymosis.  There is no erythema of the surrounding skin.  There is no maceration of the skin.  There is no deformity in the area.  Range of motion: full and (any)movements are not painful.      Brisk capillary refill to all fingers, warm and well-perfused.   Palpable radial pulse.    ASSESSMENT: 69 year old male, 2 weeks status post left carpal tunnel release , doing well.      PLAN: routine postoperative of carpal tunnel release.    Remove sutures today, soft dressing applied  May wash hands, no aggressive scrubbing for another week  Continue with hand and wrist exercises for motion.  Scar massage and desensitization discussed and demonstrated. Start in about 3 weeks.  Gradual return to light use of hands for ADLs. No aggressive hand use for another 4 weeks.  Return to clinic in 3 weeks for his right surgery followup.  It may take 6 months or longer for symptoms to resolve, and in cases of severe carpal tunnel syndrome, symptoms may not completely improve.    Michael Alejandro M.D., M.S.  Dept. of Orthopaedic Surgery  [facility]   "

## 2024-05-22 ENCOUNTER — OFFICE VISIT (OUTPATIENT)
Dept: CARDIOLOGY | Facility: CLINIC | Age: 69
End: 2024-05-22
Attending: INTERNAL MEDICINE
Payer: COMMERCIAL

## 2024-05-22 VITALS
OXYGEN SATURATION: 97 % | RESPIRATION RATE: 18 BRPM | WEIGHT: 179.4 LBS | HEIGHT: 65 IN | BODY MASS INDEX: 29.89 KG/M2 | HEART RATE: 72 BPM | DIASTOLIC BLOOD PRESSURE: 79 MMHG | SYSTOLIC BLOOD PRESSURE: 118 MMHG

## 2024-05-22 DIAGNOSIS — I77.810 ASCENDING AORTA DILATATION (H): ICD-10-CM

## 2024-05-22 DIAGNOSIS — I35.0 NONRHEUMATIC AORTIC VALVE STENOSIS: ICD-10-CM

## 2024-05-22 DIAGNOSIS — I77.810 DILATATION OF THORACIC AORTA (H): ICD-10-CM

## 2024-05-22 DIAGNOSIS — Q23.81 BICUSPID AORTIC VALVE: Primary | ICD-10-CM

## 2024-05-22 DIAGNOSIS — I10 BENIGN ESSENTIAL HYPERTENSION: ICD-10-CM

## 2024-05-22 DIAGNOSIS — E78.5 HYPERLIPIDEMIA WITH TARGET LDL LESS THAN 100: ICD-10-CM

## 2024-05-22 PROCEDURE — 99214 OFFICE O/P EST MOD 30 MIN: CPT | Mod: 24 | Performed by: NURSE PRACTITIONER

## 2024-05-22 NOTE — PATIENT INSTRUCTIONS
Medication Changes:  None    Recommendations:  Check blood pressure at least 1 hour after medications. Call the clinic if your blood pressure is consistently greater than 130/80.   Recommend first-degree relatives (parents, siblings, children) be screened for bicuspid aortic valve, with ultrasound of the heart (echocardiogram), as this can be inherited.  Bicuspid aortic valve has 9% prevalence and has a higher prevalence in males, if this is found to be in more than 1 family member, the prevalence increases to 24%.      Follow-up:  Cardiology follow up at [facility]: Dr. Jose in 1 year with echo prior    Cardiology Scheduling~[phone]  Cardiology Clinic RN~[phone] (Yin RN, Kristina RN; Dominique RN)

## 2024-05-22 NOTE — PROGRESS NOTES
Cardiology Clinic Progress Note  Dustin Shah MRN# [id number]   YOB: 1955 Age: 69 year old      Primary Cardiologist:   Dr. Jose  Patient presents today for annual follow-up        History of Presenting Illness:      Dustin Shah is a pleasant 69 year old patient with a past cardiac history significant for   Bicuspid aortic valve with aortic stenosis and thoracic aortic dilatation  Moderate aortic stenosis 2022 and 2024  Ascending aorta 3.9 cm 4/2024 echo (previously 4.1 cm)  Aware of recommendations for screening first-degree relatives with echo  Hypertension  Hyperlipidemia  Angiogram 2015 no coronary disease  Past medical history significant for COPD, prior tobacco use, tubular adenoma of colon.      Most recent lipid profile, BMP reviewed today. Echo April 2024 showing normal biventricular size and function, moderate aortic stenosis with mean gradient 24 mmHg, ascending aorta 3.9 cm.  Per the reader this aorta size is unchanged.  Results reviewed today.    Aware of screening first-degree relatives with echo?  Will check with half-siblings and children again  BPs?  Controlled  Cardiac symptoms? None     Patient reports no chest pain, shortness of breath, PND, orthopnea, presyncope, syncope, edema, heart racing, or palpitations.                  Assessment and Plan:       Plan  Patient Instructions   Medication Changes:  None    Recommendations:  Check blood pressure at least 1 hour after medications. Call the clinic if your blood pressure is consistently greater than 130/80.   Recommend first-degree relatives (parents, siblings, children) be screened for bicuspid aortic valve, with ultrasound of the heart (echocardiogram), as this can be inherited.  Bicuspid aortic valve has 9% prevalence and has a higher prevalence in males, if this is found to be in more than 1 family member, the prevalence increases to 24%.      Follow-up:  Cardiology follow up at [facility]: Dr. Jose in 1 year with echo  prior    Cardiology Scheduling~[phone]  Cardiology Clinic RN~[phone] (Yin RN, Kristina RN; Dominique RN)          Bicuspid aortic valve  Nonrheumatic aortic valve stenosis  Dilatation of thoracic aorta (H24)   moderate aortic stenosis stable  Mild thoracic dilatation stable  Follow with echo      Benign essential hypertension  Controlled  Continue current medications    Hyperlipidemia with target LDL less than 100  LDL controlled  Continue statin             Respiratory:  clear to auscultation; normal symmetry        Cardiac: regular rate and rhythm 3/6 KEKE   GI:  abdomen nondistended     Extremities and Muscular Skeletal:   no edema            Thank you for allowing me to participate in this delightful patient's care.   This note was completed in part using Dragon voice recognition software. Although reviewed after completion, some word and grammatical errors may occur.    Carina Braswell, JAMISON CNP

## 2024-05-22 NOTE — LETTER
5/22/2024    Kirill Hauser MD  [address] [facility] [address]    RE: Dustin Shah       Dear Colleague,     I had the pleasure of seeing Dustin Shah in the [facility].  Cardiology Clinic Progress Note  Dustin Shah MRN# [id number]   YOB: 1955 Age: 69 year old      Primary Cardiologist:   Dr. Jose  Patient presents today for annual follow-up        History of Presenting Illness:      Dustin Shah is a pleasant 69 year old patient with a past cardiac history significant for   Bicuspid aortic valve with aortic stenosis and thoracic aortic dilatation  Moderate aortic stenosis 2022 and 2024  Ascending aorta 3.9 cm 4/2024 echo (previously 4.1 cm)  Aware of recommendations for screening first-degree relatives with echo  Hypertension  Hyperlipidemia  Angiogram 2015 no coronary disease  Past medical history significant for COPD, prior tobacco use, tubular adenoma of colon.      Most recent lipid profile, BMP reviewed today. Echo April 2024 showing normal biventricular size and function, moderate aortic stenosis with mean gradient 24 mmHg, ascending aorta 3.9 cm.  Per the reader this aorta size is unchanged.  Results reviewed today.    Aware of screening first-degree relatives with echo?  Will check with half-siblings and children again  BPs?  Controlled  Cardiac symptoms? None     Patient reports no chest pain, shortness of breath, PND, orthopnea, presyncope, syncope, edema, heart racing, or palpitations.                  Assessment and Plan:       Plan  Patient Instructions   Medication Changes:  None    Recommendations:  Check blood pressure at least 1 hour after medications. Call the clinic if your blood pressure is consistently greater than 130/80.   Recommend first-degree relatives (parents, siblings, children) be screened for bicuspid aortic valve, with ultrasound of the heart (echocardiogram), as this can be inherited.  Bicuspid aortic valve has 9%  prevalence and has a higher prevalence in males, if this is found to be in more than 1 family member, the prevalence increases to 24%.      Follow-up:  Cardiology follow up at [facility]: Dr. Jose in 1 year with echo prior    Cardiology Scheduling~[phone]  Cardiology Clinic RN~[phone] (Yin RN, Kristina RN; Dominique RN)          Bicuspid aortic valve  Nonrheumatic aortic valve stenosis  Dilatation of thoracic aorta (H24)   moderate aortic stenosis stable  Mild thoracic dilatation stable  Follow with echo      Benign essential hypertension  Controlled  Continue current medications    Hyperlipidemia with target LDL less than 100  LDL controlled  Continue statin             Respiratory:  clear to auscultation; normal symmetry        Cardiac: regular rate and rhythm 3/6 KEKE   GI:  abdomen nondistended     Extremities and Muscular Skeletal:   no edema            Thank you for allowing me to participate in this delightful patient's care.   This note was completed in part using Dragon voice recognition software. Although reviewed after completion, some word and grammatical errors may occur.    JAMISON Camara CNP      Thank you for allowing me to participate in the care of your patient.      Sincerely,     JAMISON Camara CNP     [facility]  cc:   Brandan Jose MD  [address]  [place] [id number]

## 2024-05-29 ENCOUNTER — OFFICE VISIT (OUTPATIENT)
Dept: ORTHOPEDICS | Facility: CLINIC | Age: 69
End: 2024-05-29
Payer: COMMERCIAL

## 2024-05-29 VITALS
DIASTOLIC BLOOD PRESSURE: 76 MMHG | WEIGHT: 179 LBS | BODY MASS INDEX: 29.82 KG/M2 | SYSTOLIC BLOOD PRESSURE: 113 MMHG | HEART RATE: 71 BPM | HEIGHT: 65 IN

## 2024-05-29 DIAGNOSIS — Z98.890 S/P CARPAL TUNNEL RELEASE: Primary | ICD-10-CM

## 2024-05-29 PROCEDURE — 99024 POSTOP FOLLOW-UP VISIT: CPT | Performed by: ORTHOPAEDIC SURGERY

## 2024-05-29 ASSESSMENT — PAIN SCALES - GENERAL: PAINLEVEL: NO PAIN (0)

## 2024-05-29 NOTE — LETTER
5/29/2024         RE: Dustin Shah  [address]  [place] [address]        Dear Colleague,    Thank you for referring your patient, Dustin Shah, to the [facility] [place]. Please see a copy of my visit note below.    Chief Complaint   Patient presents with     Left Wrist - Surgical Followup     Post op carpal tunnel release on 5/14/24. Patient has minimal numbness remaining. No pain today in the wrist. Sutures removed today.       SURGERY: left carpal tunnel release.  DATE OF SURGERY: 5/14/2024      HPI: Dustin Shah is a 69 year old male , who presents for post-surgical follow-up of a left carpal tunnel release.    It has now been 2 weeks. since surgery. He has remained in the splint. Denies fevers, chills or night sweats. There have been no wound problems. He has been doing active/passive finger range of motion exercises. He reports having mild pain/discomfort around the surgical site. He states that since surgery, preop symptoms have improved significantly.     He has right carpal tunnel release surgery scheduled for 6/6/2024.      PAST MEDICAL HISTORY:   Past Medical History:   Diagnosis Date     Bicuspid aortic valve      COPD (chronic obstructive pulmonary disease) (H) 10/2015     CTS (carpal tunnel syndrome)     B CTS  + EMG 2021     Hyperlipidemia 10/2015     Hypertension 10/2015       PAST SURGICAL HISTORY:   Past Surgical History:   Procedure Laterality Date     ABDOMEN SURGERY       COLONOSCOPY       COLONOSCOPY N/A 2/14/2020    Procedure: COLONOSCOPY, WITH POLYPECTOMY AND BIOPSY;  Surgeon: Emerson Meyer MD;  Location: [facility]     ENT SURGERY      Tonsels     HERNIA REPAIR       ORTHOPEDIC SURGERY  feb 2017    broken ankle     RELEASE CARPAL TUNNEL Left 5/14/2024    Procedure: RELEASE, left CARPAL TUNNEL;  Surgeon: Michael Alejandro MD;  Location: [facility]       MEDICATIONS: CMED@     ALLERGIES:   Allergies   Allergen Reactions     Nka [No Known Allergies]          PHYSICAL  "EXAM  GENERAL APPEARANCE: healthy, alert, no distress.   SKIN: no suspicious lesions or rashes  RESPIRATORY: No increased work of breathing.  NEURO: Normal strength and tone, mentation intact and speech normal  PSYCH:  mentation appears normal and affect normal/bright. Not anxious.        /76   Pulse 71   Ht 1.657 m (5' 5.25\")   Wt 81.2 kg (179 lb)   BMI 29.56 kg/m       HAND / WRIST EXAM:    The splint was removed.  Volar incision healing well with skin edges well approximated and everted.  Sutures in place.  No erythema. No drainage.    There is mild swelling in the palm, incisional area of the wrist.  There is mild tenderness in the palm, incisional area of the wrist.  There is resolving ecchymosis.  There is no erythema of the surrounding skin.  There is no maceration of the skin.  There is no deformity in the area.  Range of motion: full and (any)movements are not painful.      Brisk capillary refill to all fingers, warm and well-perfused.   Palpable radial pulse.    ASSESSMENT: 69 year old male, 2 weeks status post left carpal tunnel release , doing well.      PLAN: routine postoperative of carpal tunnel release.    Remove sutures today, soft dressing applied  May wash hands, no aggressive scrubbing for another week  Continue with hand and wrist exercises for motion.  Scar massage and desensitization discussed and demonstrated. Start in about 3 weeks.  Gradual return to light use of hands for ADLs. No aggressive hand use for another 4 weeks.  Return to clinic in 3 weeks for his right surgery followup.  It may take 6 months or longer for symptoms to resolve, and in cases of severe carpal tunnel syndrome, symptoms may not completely improve.    Michael Alejandro M.D., M.S.  Dept. of Orthopaedic Surgery  [facility]       Again, thank you for allowing me to participate in the care of your patient.        Sincerely,        Michael Alejandro MD  "

## 2024-06-03 ENCOUNTER — ANESTHESIA EVENT (OUTPATIENT)
Dept: SURGERY | Facility: CLINIC | Age: 69
End: 2024-06-03
Payer: COMMERCIAL

## 2024-06-04 ENCOUNTER — ANESTHESIA (OUTPATIENT)
Dept: SURGERY | Facility: CLINIC | Age: 69
End: 2024-06-04
Payer: COMMERCIAL

## 2024-06-04 ENCOUNTER — HOSPITAL ENCOUNTER (OUTPATIENT)
Facility: CLINIC | Age: 69
Discharge: HOME | End: 2024-06-04
Attending: ORTHOPAEDIC SURGERY | Admitting: ORTHOPAEDIC SURGERY
Payer: COMMERCIAL

## 2024-06-04 VITALS
TEMPERATURE: 98.1 F | OXYGEN SATURATION: 92 % | HEART RATE: 73 BPM | RESPIRATION RATE: 18 BRPM | DIASTOLIC BLOOD PRESSURE: 79 MMHG | SYSTOLIC BLOOD PRESSURE: 129 MMHG

## 2024-06-04 DIAGNOSIS — G56.01 RIGHT CARPAL TUNNEL SYNDROME: Primary | ICD-10-CM

## 2024-06-04 PROCEDURE — 999N000141 HC STATISTIC PRE-PROCEDURE NURSING ASSESSMENT: Performed by: ORTHOPAEDIC SURGERY

## 2024-06-04 PROCEDURE — 272N000001 HC OR GENERAL SUPPLY STERILE: Performed by: ORTHOPAEDIC SURGERY

## 2024-06-04 PROCEDURE — 64721 CARPAL TUNNEL SURGERY: CPT | Mod: 79 | Performed by: ORTHOPAEDIC SURGERY

## 2024-06-04 PROCEDURE — 250N000011 HC RX IP 250 OP 636: Performed by: ORTHOPAEDIC SURGERY

## 2024-06-04 PROCEDURE — 360N000075 HC SURGERY LEVEL 2, PER MIN: Performed by: ORTHOPAEDIC SURGERY

## 2024-06-04 PROCEDURE — 250N000009 HC RX 250: Performed by: ORTHOPAEDIC SURGERY

## 2024-06-04 PROCEDURE — 710N000012 HC RECOVERY PHASE 2, PER MINUTE: Performed by: ORTHOPAEDIC SURGERY

## 2024-06-04 RX ORDER — OXYCODONE HYDROCHLORIDE 5 MG/1
5 TABLET ORAL
Status: CANCELLED | OUTPATIENT
Start: 2024-06-04

## 2024-06-04 RX ORDER — HYDROXYZINE HYDROCHLORIDE 10 MG/1
10 TABLET, FILM COATED ORAL
Status: CANCELLED | OUTPATIENT
Start: 2024-06-04

## 2024-06-04 RX ORDER — ONDANSETRON 4 MG/1
4 TABLET, ORALLY DISINTEGRATING ORAL
Status: CANCELLED | OUTPATIENT
Start: 2024-06-04

## 2024-06-04 RX ORDER — LIDOCAINE HYDROCHLORIDE 10 MG/ML
INJECTION, SOLUTION INFILTRATION; PERINEURAL PRN
Status: DISCONTINUED | OUTPATIENT
Start: 2024-06-04 | End: 2024-06-04 | Stop reason: HOSPADM

## 2024-06-04 RX ORDER — BUPIVACAINE HYDROCHLORIDE 2.5 MG/ML
INJECTION, SOLUTION INFILTRATION; PERINEURAL PRN
Status: DISCONTINUED | OUTPATIENT
Start: 2024-06-04 | End: 2024-06-04 | Stop reason: HOSPADM

## 2024-06-04 ASSESSMENT — ACTIVITIES OF DAILY LIVING (ADL)
ADLS_ACUITY_SCORE: 22
ADLS_ACUITY_SCORE: 22

## 2024-06-04 NOTE — H&P
The previous visit on patient's chart was personally reviewed today with the patient. there have been no interval changes in patient's history since last visit.    History:  Dustin Shah is a 69 year old male , Left -hand dominant with Bilateral hand numbness and tingling, pain, and weakness x  years.  The symptoms have been constant, and helped by braces at night.    he has numbness and tingling in all digits both hands, but mainly the long/ring/small fingers especially when not wearing the braces.  Other symptoms include pain up arm.  Pain with using the hands, driving, etc.     Treatment with bilateral braces. Topical voltaren.     Retired  of 40+ years.     He does have a sore neck, but wearing the wrist braces do seem to help with the neck pain.     Has COPD.    He is now status post left carpal tunnel release 5/14/2024, and today wants to proceed with right carpal tunnel release.     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  Probable mild right median neuropathy at the wrist.  Possible mild left ulnar neuropathy at the elbow.         ASSESSMENT/PLAN: 68yo LHD male with:  1) bilateral hand pain, numbness and tingling, carpal tunnel syndrome, possible cubital tunnel  2) bilateral thumb and wrist osteoarthritis..     * discussed with patient signs and symptoms somewhat consistent with carpal tunnel syndrome. Carpal tunnel syndrome is compression or pinching of the median nerve at the wrist, as it enters the hand. There are many different causes, and in most cases, multifactorial.  Usually affects the thumb, index, long fingers, not usually the small finger. In his case, seems to affect more the long, ring, small fingers. So could have some nerve overlap.     * An indepth discussion was had with him about the options for treatment, which included activity modification to avoid  aggravating activities, taking breaks during activities that cause symptoms, stretching, NSAIDS to help decrease inflammation and swelling within the carpal tunnel, night splinting, corticosteroid injections, and carpal tunnel release.   * depending upon severity and duration of symptoms, nonoperative treatment is usually initiated, starting with least invasive modalities such as activity modification and a trial of night splints and NSAIDs.  * Cortisone injections are considered to decrease swelling and inflammation within the carpal tunnel and compression of the nerve.   * Lastly, carpal tunnel release should symptoms persist despite trial of nonoperative treatment, or in cases of severe carpal tunnel syndrome.  * risks of surgery, including, but not limited to: bleeding, infection, pain, scar, damage to adjacent structures (nerves, vessels, tendons), temporary versus permanent nerve injury, failure to relieve symptoms, recurrence of symptoms, incomplete release, stiffness, scar sensitivity and tenderness, need for further surgery, risks of anesthesia were discussed.  * in some cases, with severe, prolonged symptoms, or in situations of underlying peripheral neuropathy, there may be permanent nerve changes not amenable to surgery, that even with surgery, may not resolve.  * in some cases, it may take 6 months to a year or longer for symptoms to improve or resolve, but even then may not completely resolve.     * plan:   right carpal tunnel release. Local anesthetic only.     Patient elects to proceed with planned procedure. Right carpal tunnel release.       Risks and perceived benefits of surgery again discussed with patient. Patient's questions addressed and answered. Written informed consent obtained and reviewed. Surgical site marked with indelible marker with patient's participation after confirming site with patient.      Michael Alejandro M.D., M.S.  Dept. of Orthopaedic Surgery  [facility]

## 2024-06-04 NOTE — OP NOTE
OPERATIVE REPORT    DATE OF SERVICE:  6/4/2024      PREOPERATIVE DIAGNOSIS  Right carpal tunnel syndrome.      POSTOPERATIVE DIAGNOSIS  Right carpal tunnel syndrome.      NAME OF OPERATION  Open right carpal tunnel release.     SURGEON  Michael Alejandro MD     FIRST ASSISTANT  Guy Robert PA-C     ANESTHESIA: local anesthetic only.    ESTIMATED BLOOD LOSS: 1mL    ANTIBIOTICS: none , prior to incision    IVF: 0 ml LR.no iv placed.    FINDINGS: thick transverse carpal ligament, flattened median nerve, hyperemic changes of median nerve.    Complications: None apparent.    SPECIMENS: none    DRAINS: none    IMPLANTS: none    INDICATIONS  Dustin Shah is a 69 year old male with  right carpal tunnel syndrome, confirmed on EMG. The symptoms have been quite bothersome.  Conservative treatment has failed, including night splints, therapy, NSAIDs and injections. Risks of surgery, including but not limited to: bleeding, infection, pain, scar, damage to adjacent structures (nerves, vessels), temporary vs permanent nerve damage, failure to relieve symptoms, recurrence of symptoms, need for further surgery, risks of anesthesia, and death were discussed. All questions were addressed to patient satisfaction. The patient elected to proceed with this surgery understanding the risks and perceived benefits. Informed consent was obtained for the procedure.       PROCEDURE IN DETAIL  The patient was identified in the preoperative holding area. The correct procedure and procedural site was confirmed with the patient. Consent was reviewed. The correct surgical site was marked with an indelible marker by the attending surgeon, Michael Alejandro MD.  The patient was then taken to the operating room and placed on the operating table in the supine position.  Tourniquet was placed around the proximal arm. All bony prominences well padded. The limb was prepped and draped in the usual sterile fashion.  Local anesthetic using a combination of  0.25% Marcaine and 1% lidocaine was injected in the anticipated incision site.  The limb was exsanguinated and tourniquet inflated to 200 mmHg.    A longitudinal incision was made in the usual location at the base of the palm just ulnar to midline and the palmaris longus.  The incision was taken down through the skin and subcutaneous tissue carefully to the level of the palmar fascia.  The palmar fascia was then carefully incised, and the transverse carpal ligament was then exposed. The transverse carpal ligament was incised from distal to proximal under direct vision while protecting underlying structures with an elevator.  The transverse carpal ligament was tight and thick.  Proximally, we protected the underlying structures using a clamp, and then completed the ligament incision using a tenotomy scissors.  The distal volar forearm fascia was also incised longitudinally for a segment of approximately 3cm. The median nerve was completely exposed and was noted to have flattened, hyperemic appearance. Once I felt that the transverse carpal ligament was released completely, I used my small finger as a probe to make sure that it was in fact completely released, as it was. The wound was copiously irrigated with normal saline and the tourniquet deflated.  Hemostasis was achieved with electrocautery.  Then, 4-0 nylon sutures placed in a horizontal mattress fashion were used to close the skin, and a sterile dressing was applied followed by a volar splint. The patient was then transferred to the hospital cart and to the recovery area in stable condition. There were no apparent complications.    Postop plan will be partial weight-bearing of right upper extremity. Splint to be in place at all times until follow-up in 10-14 days.  Elevation of right upper extremity at all times to reduce swelling. Finger range of motion. Over the counter pain medications versus Norco (previously prescribed at last surgery but didn't use, so no  new prescription today)    Michael Alejandro M.D., M.S.  Dept. of Orthopaedic Surgery  [facility]

## 2024-06-11 NOTE — PROGRESS NOTES
Chief Complaint   Patient presents with    Right Wrist - Pain     Right carpal tunnel release. DOS: 6/4/24. 2 weeks post-op. Remained in surgical splint. Doing great. Pre-op symptoms have resolved.          SURGERY: right  carpal tunnel release.  DATE OF SURGERY: 6/4/2024    SURGERY: left carpal tunnel release.  DATE OF SURGERY: 5/14/2024      HPI: Dustin Shah is a 69 year old male , who presents for post-surgical follow-up of a right carpal tunnel release.    It has now been 2 weeks. since surgery. He has remained in the splint. Denies fevers, chills or night sweats. There have been no wound problems. He has been doing active/passive finger range of motion exercises. He reports having mild pain/discomfort around the surgical site. He states that since surgery, preop symptoms have essentially resolved.    He is also status post left carpal tunnel release 5/14/2024, doing well.      PAST MEDICAL HISTORY:   Past Medical History:   Diagnosis Date    Bicuspid aortic valve     COPD (chronic obstructive pulmonary disease) (H) 10/2015    CTS (carpal tunnel syndrome)     B CTS  + EMG 2021    Hyperlipidemia 10/2015    Hypertension 10/2015       PAST SURGICAL HISTORY:   Past Surgical History:   Procedure Laterality Date    ABDOMEN SURGERY      COLONOSCOPY      COLONOSCOPY N/A 2/14/2020    Procedure: COLONOSCOPY, WITH POLYPECTOMY AND BIOPSY;  Surgeon: Emerson Meyer MD;  Location: [facility]    ENT SURGERY      Tonsels    HERNIA REPAIR      ORTHOPEDIC SURGERY  feb 2017    broken ankle    RELEASE CARPAL TUNNEL Left 5/14/2024    Procedure: RELEASE, left CARPAL TUNNEL;  Surgeon: Michael Alejandro MD;  Location: [facility]    RELEASE CARPAL TUNNEL Right 6/4/2024    Procedure: RELEASE, right CARPAL TUNNEL;  Surgeon: Michael Alejandro MD;  Location: [facility]       MEDICATIONS:   Current Outpatient Medications:     albuterol (PROAIR HFA/PROVENTIL HFA/VENTOLIN HFA) 108 (90 Base) MCG/ACT inhaler, Inhale 2 puffs into the lungs every 4 hours as  "needed for shortness of breath or wheezing, Disp: 18 g, Rfl: 3    aspirin 81 MG tablet, Take by mouth daily, Disp: 30 tablet, Rfl:     calcium carbonate-vitamin D 500-400 MG-UNIT TABS tablt, Take 1 tablet by mouth daily, Disp: 180 tablet, Rfl: 3    fluticasone-salmeterol (ADVAIR) 250-50 MCG/ACT inhaler, Inhale 1 puff into the lungs 2 times daily, Disp: 180 each, Rfl: 3    lisinopril (ZESTRIL) 20 MG tablet, Take 1 tablet (20 mg) by mouth daily, Disp: 90 tablet, Rfl: 3    omega 3 1000 MG CAPS, Take 1 g by mouth daily, Disp: 90 capsule, Rfl:     rosuvastatin (CRESTOR) 10 MG tablet, Take 1 tablet (10 mg) by mouth daily. needs labs., Disp: 90 tablet, Rfl: 3     ALLERGIES:   Allergies   Allergen Reactions    Nka [No Known Allergies]          PHYSICAL EXAM  GENERAL APPEARANCE: healthy, alert, no distress.   SKIN: no suspicious lesions or rashes  RESPIRATORY: No increased work of breathing.  NEURO: Normal strength and tone, mentation intact and speech normal  PSYCH:  mentation appears normal and affect normal/bright. Not anxious.        Ht 1.657 m (5' 5.25\")   Wt 81.6 kg (180 lb)   BMI 29.72 kg/m       HAND / WRIST EXAM:    The splint was removed.  Volar incision healing well with skin edges well approximated and everted.  Sutures in place.  No erythema. No drainage.    There is mild swelling in the palm, incisional area of the wrist.  There is mild tenderness in the palm, incisional area of the wrist.  There is resolving ecchymosis.  There is no erythema of the surrounding skin.  There is no maceration of the skin.  There is no deformity in the area.  Range of motion: full and (any)movements are not painful.      Brisk capillary refill to all fingers, warm and well-perfused.   Palpable radial pulse.    ASSESSMENT: 69 year old male, 2 weeks status post right carpal tunnel release , doing well.      PLAN: routine postoperative of carpal tunnel release.    Remove sutures today, soft dressing applied  May wash hands, no " aggressive scrubbing for another week  Continue with hand and wrist exercises for motion.  Scar massage and desensitization discussed and demonstrated. Start in about 3 weeks.  Gradual return to light use of hands for ADLs. No aggressive hand use for another 4 weeks.  Return to clinic as needed.  It may take 6 months or longer for symptoms to resolve, and in cases of severe carpal tunnel syndrome, symptoms may not completely improve.    Michael Alejandro M.D., M.S.  Dept. of Orthopaedic Surgery  [facility]

## 2024-06-19 ENCOUNTER — OFFICE VISIT (OUTPATIENT)
Dept: ORTHOPEDICS | Facility: CLINIC | Age: 69
End: 2024-06-19
Payer: COMMERCIAL

## 2024-06-19 VITALS — BODY MASS INDEX: 29.99 KG/M2 | WEIGHT: 180 LBS | HEIGHT: 65 IN

## 2024-06-19 DIAGNOSIS — Z98.890 S/P CARPAL TUNNEL RELEASE: Primary | ICD-10-CM

## 2024-06-19 PROCEDURE — 99024 POSTOP FOLLOW-UP VISIT: CPT | Performed by: ORTHOPAEDIC SURGERY

## 2024-06-19 ASSESSMENT — PAIN SCALES - GENERAL: PAINLEVEL: NO PAIN (1)

## 2024-06-19 NOTE — LETTER
6/19/2024      Dustin Shah  [address]  [place] [address]      Dear Colleague,    Thank you for referring your patient, Dustin Shah, to the [facility] [place]. Please see a copy of my visit note below.    Chief Complaint   Patient presents with     Right Wrist - Pain     Right carpal tunnel release. DOS: 6/4/24. 2 weeks post-op. Remained in surgical splint. Doing great. Pre-op symptoms have resolved.          SURGERY: right  carpal tunnel release.  DATE OF SURGERY: 6/4/2024    SURGERY: left carpal tunnel release.  DATE OF SURGERY: 5/14/2024      HPI: Dustin Shah is a 69 year old male , who presents for post-surgical follow-up of a right carpal tunnel release.    It has now been 2 weeks. since surgery. He has remained in the splint. Denies fevers, chills or night sweats. There have been no wound problems. He has been doing active/passive finger range of motion exercises. He reports having mild pain/discomfort around the surgical site. He states that since surgery, preop symptoms have essentially resolved.    He is also status post left carpal tunnel release 5/14/2024, doing well.      PAST MEDICAL HISTORY:   Past Medical History:   Diagnosis Date     Bicuspid aortic valve      COPD (chronic obstructive pulmonary disease) (H) 10/2015     CTS (carpal tunnel syndrome)     B CTS  + EMG 2021     Hyperlipidemia 10/2015     Hypertension 10/2015       PAST SURGICAL HISTORY:   Past Surgical History:   Procedure Laterality Date     ABDOMEN SURGERY       COLONOSCOPY       COLONOSCOPY N/A 2/14/2020    Procedure: COLONOSCOPY, WITH POLYPECTOMY AND BIOPSY;  Surgeon: Emerson Meyer MD;  Location: [facility]     ENT SURGERY      Tonsels     HERNIA REPAIR       ORTHOPEDIC SURGERY  feb 2017    broken ankle     RELEASE CARPAL TUNNEL Left 5/14/2024    Procedure: RELEASE, left CARPAL TUNNEL;  Surgeon: Michael Alejandro MD;  Location: [facility]     RELEASE CARPAL TUNNEL Right 6/4/2024    Procedure: RELEASE, right  Called in RX for amlodipine. Pt to take once daily and follow up in 2 weeks for nursing BP check   "CARPAL TUNNEL;  Surgeon: Michael Alejandro MD;  Location: [facility]       MEDICATIONS:   Current Outpatient Medications:      albuterol (PROAIR HFA/PROVENTIL HFA/VENTOLIN HFA) 108 (90 Base) MCG/ACT inhaler, Inhale 2 puffs into the lungs every 4 hours as needed for shortness of breath or wheezing, Disp: 18 g, Rfl: 3     aspirin 81 MG tablet, Take by mouth daily, Disp: 30 tablet, Rfl:      calcium carbonate-vitamin D 500-400 MG-UNIT TABS tablt, Take 1 tablet by mouth daily, Disp: 180 tablet, Rfl: 3     fluticasone-salmeterol (ADVAIR) 250-50 MCG/ACT inhaler, Inhale 1 puff into the lungs 2 times daily, Disp: 180 each, Rfl: 3     lisinopril (ZESTRIL) 20 MG tablet, Take 1 tablet (20 mg) by mouth daily, Disp: 90 tablet, Rfl: 3     omega 3 1000 MG CAPS, Take 1 g by mouth daily, Disp: 90 capsule, Rfl:      rosuvastatin (CRESTOR) 10 MG tablet, Take 1 tablet (10 mg) by mouth daily. needs labs., Disp: 90 tablet, Rfl: 3     ALLERGIES:   Allergies   Allergen Reactions     Nka [No Known Allergies]          PHYSICAL EXAM  GENERAL APPEARANCE: healthy, alert, no distress.   SKIN: no suspicious lesions or rashes  RESPIRATORY: No increased work of breathing.  NEURO: Normal strength and tone, mentation intact and speech normal  PSYCH:  mentation appears normal and affect normal/bright. Not anxious.        Ht 1.657 m (5' 5.25\")   Wt 81.6 kg (180 lb)   BMI 29.72 kg/m       HAND / WRIST EXAM:    The splint was removed.  Volar incision healing well with skin edges well approximated and everted.  Sutures in place.  No erythema. No drainage.    There is mild swelling in the palm, incisional area of the wrist.  There is mild tenderness in the palm, incisional area of the wrist.  There is resolving ecchymosis.  There is no erythema of the surrounding skin.  There is no maceration of the skin.  There is no deformity in the area.  Range of motion: full and (any)movements are not painful.      Brisk capillary refill to all fingers, warm and " well-perfused.   Palpable radial pulse.    ASSESSMENT: 69 year old male, 2 weeks status post right carpal tunnel release , doing well.      PLAN: routine postoperative of carpal tunnel release.    Remove sutures today, soft dressing applied  May wash hands, no aggressive scrubbing for another week  Continue with hand and wrist exercises for motion.  Scar massage and desensitization discussed and demonstrated. Start in about 3 weeks.  Gradual return to light use of hands for ADLs. No aggressive hand use for another 4 weeks.  Return to clinic as needed.  It may take 6 months or longer for symptoms to resolve, and in cases of severe carpal tunnel syndrome, symptoms may not completely improve.    Michael Alejandro M.D., M.S.  Dept. of Orthopaedic Surgery  [facility]       Again, thank you for allowing me to participate in the care of your patient.        Sincerely,        Michael Alejandro MD

## 2024-10-24 ENCOUNTER — IMMUNIZATION (OUTPATIENT)
Dept: FAMILY MEDICINE | Facility: CLINIC | Age: 69
End: 2024-10-24
Payer: COMMERCIAL

## 2024-10-24 PROCEDURE — 90471 IMMUNIZATION ADMIN: CPT

## 2024-10-24 PROCEDURE — 90662 IIV NO PRSV INCREASED AG IM: CPT

## 2024-10-28 ENCOUNTER — LAB (OUTPATIENT)
Dept: LAB | Facility: CLINIC | Age: 69
End: 2024-10-28
Payer: COMMERCIAL

## 2024-10-28 DIAGNOSIS — Z12.5 SCREENING FOR PROSTATE CANCER: ICD-10-CM

## 2024-10-28 DIAGNOSIS — E78.5 HYPERLIPIDEMIA WITH TARGET LDL LESS THAN 100: ICD-10-CM

## 2024-10-28 DIAGNOSIS — I10 BENIGN ESSENTIAL HYPERTENSION: ICD-10-CM

## 2024-10-28 LAB
ANION GAP SERPL CALCULATED.3IONS-SCNC: 11 MMOL/L (ref 7–15)
BUN SERPL-MCNC: 15.3 MG/DL (ref 8–23)
CALCIUM SERPL-MCNC: 10.1 MG/DL (ref 8.8–10.4)
CHLORIDE SERPL-SCNC: 101 MMOL/L (ref 98–107)
CHOLEST SERPL-MCNC: 154 MG/DL
CREAT SERPL-MCNC: 0.95 MG/DL (ref 0.67–1.17)
EGFRCR SERPLBLD CKD-EPI 2021: 87 ML/MIN/1.73M2
FASTING STATUS PATIENT QL REPORTED: YES
FASTING STATUS PATIENT QL REPORTED: YES
GLUCOSE SERPL-MCNC: 105 MG/DL (ref 70–99)
HCO3 SERPL-SCNC: 26 MMOL/L (ref 22–29)
HDLC SERPL-MCNC: 66 MG/DL
LDLC SERPL CALC-MCNC: 70 MG/DL
NONHDLC SERPL-MCNC: 88 MG/DL
POTASSIUM SERPL-SCNC: 4.1 MMOL/L (ref 3.4–5.3)
PSA SERPL DL<=0.01 NG/ML-MCNC: 0.74 NG/ML (ref 0–4.5)
SODIUM SERPL-SCNC: 138 MMOL/L (ref 135–145)
TRIGL SERPL-MCNC: 90 MG/DL

## 2024-10-28 PROCEDURE — 80048 BASIC METABOLIC PNL TOTAL CA: CPT

## 2024-10-28 PROCEDURE — G0103 PSA SCREENING: HCPCS

## 2024-10-28 PROCEDURE — 36415 COLL VENOUS BLD VENIPUNCTURE: CPT

## 2024-10-28 PROCEDURE — 80061 LIPID PANEL: CPT

## 2025-02-04 DIAGNOSIS — E78.2 MIXED HYPERLIPIDEMIA: ICD-10-CM

## 2025-02-04 RX ORDER — ROSUVASTATIN CALCIUM 10 MG/1
10 TABLET, COATED ORAL DAILY
Qty: 90 TABLET | Refills: 0 | Status: SHIPPED | OUTPATIENT
Start: 2025-02-04

## 2025-05-05 DIAGNOSIS — I10 BENIGN ESSENTIAL HYPERTENSION: ICD-10-CM

## 2025-05-05 DIAGNOSIS — E78.2 MIXED HYPERLIPIDEMIA: ICD-10-CM

## 2025-05-05 RX ORDER — ROSUVASTATIN CALCIUM 10 MG/1
10 TABLET, COATED ORAL DAILY
Qty: 30 TABLET | Refills: 0 | Status: SHIPPED | OUTPATIENT
Start: 2025-05-05

## 2025-05-05 RX ORDER — LISINOPRIL 20 MG/1
20 TABLET ORAL DAILY
Qty: 30 TABLET | Refills: 0 | Status: SHIPPED | OUTPATIENT
Start: 2025-05-05

## 2025-05-05 NOTE — TELEPHONE ENCOUNTER
LDL Cholesterol Calculated   Date Value Ref Range Status   10/28/2024 70 <100 mg/dL Final   10/22/2020 127 (H) <100 mg/dL Final     Comment:     Above desirable:  100-129 mg/dl  Borderline High:  130-159 mg/dL  High:             160-189 mg/dL  Very high:       >189 mg/dl       GFR Estimate   Date Value Ref Range Status   10/28/2024 87 >60 mL/min/1.73m2 Final     Comment:     eGFR calculated using 2021 CKD-EPI equation.   10/22/2020 77 >60 mL/min/[1.73_m2] Final     Comment:     Non  GFR Calc  Starting 12/18/2018, serum creatinine based estimated GFR (eGFR) will be   calculated using the Chronic Kidney Disease Epidemiology Collaboration   (CKD-EPI) equation.       Potassium   Date Value Ref Range Status   10/28/2024 4.1 3.4 - 5.3 mmol/L Final   02/22/2022 4.5 3.4 - 5.3 mmol/L Final   10/22/2020 4.4 3.4 - 5.3 mmol/L Final

## 2025-05-17 SDOH — HEALTH STABILITY: PHYSICAL HEALTH: ON AVERAGE, HOW MANY MINUTES DO YOU ENGAGE IN EXERCISE AT THIS LEVEL?: 0 MIN

## 2025-05-17 SDOH — HEALTH STABILITY: PHYSICAL HEALTH: ON AVERAGE, HOW MANY DAYS PER WEEK DO YOU ENGAGE IN MODERATE TO STRENUOUS EXERCISE (LIKE A BRISK WALK)?: 0 DAYS

## 2025-05-17 ASSESSMENT — SOCIAL DETERMINANTS OF HEALTH (SDOH): HOW OFTEN DO YOU GET TOGETHER WITH FRIENDS OR RELATIVES?: THREE TIMES A WEEK

## 2025-05-22 ENCOUNTER — OFFICE VISIT (OUTPATIENT)
Dept: FAMILY MEDICINE | Facility: CLINIC | Age: 70
End: 2025-05-22
Attending: FAMILY MEDICINE
Payer: COMMERCIAL

## 2025-05-22 VITALS
HEIGHT: 65 IN | DIASTOLIC BLOOD PRESSURE: 88 MMHG | SYSTOLIC BLOOD PRESSURE: 128 MMHG | OXYGEN SATURATION: 93 % | TEMPERATURE: 97.3 F | BODY MASS INDEX: 29.49 KG/M2 | RESPIRATION RATE: 16 BRPM | HEART RATE: 70 BPM | WEIGHT: 177 LBS

## 2025-05-22 DIAGNOSIS — Z87.891 HISTORY OF TOBACCO USE, PRESENTING HAZARDS TO HEALTH: ICD-10-CM

## 2025-05-22 DIAGNOSIS — Z12.5 SCREENING FOR PROSTATE CANCER: ICD-10-CM

## 2025-05-22 DIAGNOSIS — E78.2 MIXED HYPERLIPIDEMIA: ICD-10-CM

## 2025-05-22 DIAGNOSIS — I10 BENIGN ESSENTIAL HYPERTENSION: ICD-10-CM

## 2025-05-22 DIAGNOSIS — J44.9 COPD, MODERATE (H): ICD-10-CM

## 2025-05-22 DIAGNOSIS — Z00.00 ROUTINE GENERAL MEDICAL EXAMINATION AT A HEALTH CARE FACILITY: Primary | ICD-10-CM

## 2025-05-22 RX ORDER — FLUTICASONE PROPIONATE AND SALMETEROL 250; 50 UG/1; UG/1
POWDER RESPIRATORY (INHALATION)
Qty: 180 EACH | Refills: 3 | Status: SHIPPED | OUTPATIENT
Start: 2025-05-22

## 2025-05-22 RX ORDER — ALBUTEROL SULFATE 90 UG/1
2 INHALANT RESPIRATORY (INHALATION) EVERY 4 HOURS PRN
Qty: 18 G | Refills: 3 | Status: SHIPPED | OUTPATIENT
Start: 2025-05-22

## 2025-05-22 RX ORDER — ROSUVASTATIN CALCIUM 10 MG/1
10 TABLET, COATED ORAL DAILY
Qty: 90 TABLET | Refills: 3 | Status: SHIPPED | OUTPATIENT
Start: 2025-05-22

## 2025-05-22 RX ORDER — LISINOPRIL 20 MG/1
20 TABLET ORAL DAILY
Qty: 90 TABLET | Refills: 3 | Status: SHIPPED | OUTPATIENT
Start: 2025-05-22

## 2025-05-22 ASSESSMENT — PAIN SCALES - GENERAL: PAINLEVEL_OUTOF10: NO PAIN (0)

## 2025-05-22 NOTE — NURSING NOTE
Immunizations Administered       Name Date Dose VIS Date Route    COVID-19 12+ (Pfizer) 5/22/25  7:30 AM 0.3 mL EUI,01/31/2025,Given today Intramuscular          Patient presents requesting Covid-19 vaccination.  Standing orders implemented.    Vaccination given by Nellie BAXTER LPN on 5/22/2025 at 7:33 AM

## 2025-05-22 NOTE — PATIENT INSTRUCTIONS
Patient Education     To schedule the low dose CT scan of the chest for lung cancer screening, call [phone].     Medications have been refilled.    Be consistent with low salt, low trans fat and low saturated fat diet.  Eat food rich in omega-3-fatty acids as you tolerate. (salmon, olive oil)  Eat 5 cups of vegetables, fruits and whole grains per day.  Limit starchy food (white rice, white bread, white pasta, white potatoes) to less than a cup per meal.  Minimize sweets, junk food and fastfood. Limit soda beverages to one serving per day; best to avoid it altogether though.    Exercise: moderate intensity sustained for at least 30 mins per episode, goal of 150 mins per week at least  Combine cardiovascular and resistance exercises.  These exercise recommendations are in addition to your daily activity at work or home.  Work on losing weight.    Blood tests: schedule fasting lab appointment in October 2025.    Get RSV vaccine at the pharmacy in the fall.      Preventative Care Visits include: Yearly physicals, Well-child visits, Welcome to Medicare visits, & Medicare yearly wellness exams.    The purpose of these visits is to discuss your medical history and prevent health problems before you are sick.  You may need to pay a copay, coinsurance or deductible if your visit today includes testing or treating for a new or existing condition.    Additional charges may be incurred for today's visit. If you have questions about what your insurance plan covers, please contact your Insurance Company's member service department.  If you have questions specific to a bill you have already received from [employer], please contact the [facility] Billing Office at [phone].     Preventive Care Advice   This is general advice given by our system to help you stay healthy. However, your care team may have specific advice just for you. Please talk to your care team about your preventive care needs.  Nutrition  Eat 5 or more  servings of fruits and vegetables each day.  Try wheat bread, brown rice and whole grain pasta (instead of white bread, rice, and pasta).  Get enough calcium and vitamin D. Check the label on foods and aim for 100% of the RDA (recommended daily allowance).  Lifestyle  Exercise at least 150 minutes each week  (30 minutes a day, 5 days a week).  Do muscle strengthening activities 2 days a week. These help control your weight and prevent disease.  No smoking.  Wear sunscreen to prevent skin cancer.  Have a dental exam and cleaning every 6 months.  Yearly exams  See your health care team every year to talk about:  Any changes in your health.  Any medicines your care team has prescribed.  Preventive care, family planning, and ways to prevent chronic diseases.  Shots (vaccines)   HPV shots (up to age 26), if you've never had them before.  Hepatitis B shots (up to age 59), if you've never had them before.  COVID-19 shot: Get this shot when it's due.  Flu shot: Get a flu shot every year.  Tetanus shot: Get a tetanus shot every 10 years.  Pneumococcal, hepatitis A, and RSV shots: Ask your care team if you need these based on your risk.  Shingles shot (for age 50 and up)  General health tests  Diabetes screening:  Starting at age 35, Get screened for diabetes at least every 3 years.  If you are younger than age 35, ask your care team if you should be screened for diabetes.  Cholesterol test: At age 39, start having a cholesterol test every 5 years, or more often if advised.  Bone density scan (DEXA): At age 50, ask your care team if you should have this scan for osteoporosis (brittle bones).  Hepatitis C: Get tested at least once in your life.  STIs (sexually transmitted infections)  Before age 24: Ask your care team if you should be screened for STIs.  After age 24: Get screened for STIs if you're at risk. You are at risk for STIs (including HIV) if:  You are sexually active with more than one person.  You don't use condoms  every time.  You or a partner was diagnosed with a sexually transmitted infection.  If you are at risk for HIV, ask about PrEP medicine to prevent HIV.  Get tested for HIV at least once in your life, whether you are at risk for HIV or not.  Cancer screening tests  Cervical cancer screening: If you have a cervix, begin getting regular cervical cancer screening tests starting at age 21.  Breast cancer scan (mammogram): If you've ever had breasts, begin having regular mammograms starting at age 40. This is a scan to check for breast cancer.  Colon cancer screening: It is important to start screening for colon cancer at age 45.  Have a colonoscopy test every 10 years (or more often if you're at risk) Or, ask your provider about stool tests like a FIT test every year or Cologuard test every 3 years.  To learn more about your testing options, visit:   .  For help making a decision, visit:   https://bit.ly/ky30722.  Prostate cancer screening test: If you have a prostate, ask your care team if a prostate cancer screening test (PSA) at age 55 is right for you.  Lung cancer screening: If you are a current or former smoker ages 50 to 80, ask your care team if ongoing lung cancer screenings are right for you.  For informational purposes only. Not to replace the advice of your health care provider. Copyright   2023 [facility]. All rights reserved. Clinically reviewed by the [facility] Transitions Program. [employer] [id number] - REV 01/24.  Hearing Loss: Care Instructions  Overview     Hearing loss is a sudden or slow decrease in how well you hear. It can range from slight to profound. Permanent hearing loss can occur with aging. It also can happen when you are exposed long-term to loud noise. Examples include listening to loud music, riding motorcycles, or being around other loud machines.  Hearing loss can affect your work and home life. It can make you feel lonely or depressed. You may feel that you have lost  your independence. But hearing aids and other devices can help you hear better and feel connected to others.  Follow-up care is a key part of your treatment and safety. Be sure to make and go to all appointments, and call your doctor if you are having problems. It's also a good idea to know your test results and keep a list of the medicines you take.  How can you care for yourself at home?  Avoid loud noises whenever possible. This helps keep your hearing from getting worse.  Always wear hearing protection around loud noises.  Wear a hearing aid as directed.  A professional can help you pick a hearing aid that will work best for you.  You can also get hearing aids over the counter for mild to moderate hearing loss.  Have hearing tests as your doctor suggests. They can show whether your hearing has changed. Your hearing aid may need to be adjusted.  Use other devices as needed. These may include:  Telephone amplifiers and hearing aids that can connect to a television, stereo, radio, or microphone.  Devices that use lights or vibrations. These alert you to the doorbell, a ringing telephone, or a baby monitor.  Television closed-captioning. This shows the words at the bottom of the screen. Most new TVs can do this.  TTY (text telephone). This lets you type messages back and forth on the telephone instead of talking or listening. These devices are also called TDD. When messages are typed on the keyboard, they are sent over the phone line to a receiving TTY. The message is shown on a monitor.  Use text messaging, social media, and email if it is hard for you to communicate by telephone.  Try to learn a listening technique called speechreading. It is not lipreading. You pay attention to people's gestures, expressions, posture, and tone of voice. These clues can help you understand what a person is saying. Face the person you are talking to, and have them face you. Make sure the lighting is good. You need to see the other  "person's face clearly.  Think about counseling if you need help to adjust to your hearing loss.  When should you call for help?  Watch closely for changes in your health, and be sure to contact your doctor if:    You think your hearing is getting worse.     You have new symptoms, such as dizziness or nausea.   Where can you learn more?  Go to https://www.in3Dgallery.net/patiented  Enter R798 in the search box to learn more about \"Hearing Loss: Care Instructions.\"  Current as of: October 27, 2024  Content Version: 14.4    2093-0524 [employer].   Care instructions adapted under license by your healthcare professional. If you have questions about a medical condition or this instruction, always ask your healthcare professional. [employer] disclaims any warranty or liability for your use of this information.       "

## 2025-05-22 NOTE — PROGRESS NOTES
"Preventive Care Visit  [facility]  Kirill Hauser MD, Family Medicine  May 22, 2025      Assessment & Plan     Routine general medical examination at a health care facility  Patient was advised on recommended screening and preventive health recommendations.  He verbalized understanding and agreed to the plans below.   Covid19 vaccine today    COPD, moderate (H)  Stable.  - albuterol (PROAIR HFA/PROVENTIL HFA/VENTOLIN HFA) 108 (90 Base) MCG/ACT inhaler  Dispense: 18 g; Refill: 3  - fluticasone-salmeterol (ADVAIR) 250-50 MCG/ACT inhaler  Dispense: 180 each; Refill: 3  - OFFICE/OUTPT VISIT,EST,LEVL IV    Benign essential hypertension  Controlled.  Low salt, low fat diet.   Exercise as tolerated.  Take meds as prescribed; call if with side effects.    - lisinopril (ZESTRIL) 20 MG tablet  Dispense: 90 tablet; Refill: 3  - Basic metabolic panel  - OFFICE/OUTPT VISIT,EST,LEVL IV    Mixed hyperlipidemia  Reinforced heart healthy lifestyle.   - rosuvastatin (CRESTOR) 10 MG tablet  Dispense: 90 tablet; Refill: 3  - Lipid panel reflex to direct LDL Fasting  - ALT  - OFFICE/OUTPT VISIT,EST,LEVL IV    History of tobacco use, presenting hazards to health  - CT Chest Lung Cancer Screen Low Dose Without    Screening for prostate cancer  - Prostate Specific Antigen Screen    The longitudinal plan of care for the diagnosis(es)/condition(s) as documented were addressed during this visit. Due to the added complexity in care, I will continue to support Dustin in the subsequent management and with ongoing continuity of care.      Patient has been advised of split billing requirements and indicates understanding: Yes        BMI  Estimated body mass index is 29.23 kg/m  as calculated from the following:    Height as of this encounter: 1.657 m (5' 5.25\").    Weight as of this encounter: 80.3 kg (177 lb).   Weight management plan: Discussed healthy diet and exercise guidelines    Counseling  Appropriate " preventive services were addressed with this patient via screening, questionnaire, or discussion as appropriate for fall prevention, nutrition, physical activity, Tobacco-use cessation, social engagement, weight loss and cognition.  Checklist reviewing preventive services available has been given to the patient.  Reviewed patient's diet, addressing concerns and/or questions.   The patient was provided with written information regarding signs of hearing loss.       Follow-up   Return in about 1 year (around 5/22/2026) for In-person visit for next wellness exam.     Follow-up Visit   Expected date:  May 22, 2026 (Approximate)      Follow Up Appointment Details:     Follow-up with whom?: PCP    Follow-Up for what?: Adult Preventive    How?: In Person                 Boaz Chan is a 70 year old, presenting for the following:  Physical        5/22/2025     6:48 AM   Additional Questions   Roomed by Monie GRIMM   Accompanied by self         5/22/2025     6:48 AM   Patient Reported Additional Medications   Patient reports taking the following new medications none          HPI       Hyperlipidemia Follow-Up    Are you regularly taking any medication or supplement to lower your cholesterol?   Yes- rosuvastatin 10 mg  Are you having muscle aches or other side effects that you think could be caused by your cholesterol lowering medication?  No    Hypertension Follow-up    Do you check your blood pressure regularly outside of the clinic? No   Are you following a low salt diet? Yes  Are your blood pressures ever more than 140 on the top number (systolic) OR more   than 90 on the bottom number (diastolic), for example 140/90? Not checking    BP Readings from Last 2 Encounters:   05/22/25 128/88   06/04/24 129/79     COPD Follow-Up  Overall, how are your COPD symptoms since your last clinic visit?  Slightly worse  How much fatigue or shortness of breath do you have when you are walking?  Same as usual  How much shortness of  "breath do you have when you are resting?  None  How often do you cough? Often  Have you noticed any change in your sputum/phlegm?  No  Have you experienced a recent fever? No  Please describe how far you can walk without stopping to rest:  Less than a mile  How many flights of stairs are you able to walk up without stopping?  3 or more  Have you had any Emergency Room Visits, Urgent Care Visits, or Hospital Admissions because of your COPD since your last office visit?  No    History   Smoking Status    Former    Types: Cigarettes   Smokeless Tobacco    Never     No results found for: \"FEV1\", \"PWK8GUS\"    Advance Care Planning    Discussed advance care planning with patient; informed AVS has link to [employer].        5/17/2025   General Health   How would you rate your overall physical health? Good   Feel stress (tense, anxious, or unable to sleep) Not at all         5/17/2025   Nutrition   Diet: Regular (no restrictions)         5/17/2025   Exercise   Days per week of moderate/strenous exercise 0 days   Average minutes spent exercising at this level 0 min   (!) EXERCISE CONCERN      5/17/2025   Social Factors   Frequency of gathering with friends or relatives Three times a week   Worry food won't last until get money to buy more No   Food not last or not have enough money for food? No   Do you have housing? (Housing is defined as stable permanent housing and does not include staying outside in a car, in a tent, in an abandoned building, in an overnight shelter, or couch-surfing.) Yes   Are you worried about losing your housing? No   Lack of transportation? No   Unable to get utilities (heat,electricity)? No         5/17/2025   Fall Risk   Fallen 2 or more times in the past year? No   Trouble with walking or balance? No          5/17/2025   Activities of Daily Living- Home Safety   Needs help with the following daily activites None of the above   Safety concerns in the home None of the above         5/17/2025 "   Dental   Dentist two times every year? Yes         2025   Hearing Screening   Hearing concerns? (!) I FEEL THAT PEOPLE ARE MUMBLING OR NOT SPEAKING CLEARLY.    (!) I NEED TO ASK PEOPLE TO SPEAK UP OR REPEAT THEMSELVES.    (!) IT'S HARD TO FOLLOW A CONVERSATION IN A NOISY RESTAURANT OR CROWDED ROOM.    (!) TROUBLE UNDERSTANDING SOFT OR WHISPERED SPEECH.       Multiple values from one day are sorted in reverse-chronological order         2025   Driving Risk Screening   Patient/family members have concerns about driving No         2025   General Alertness/Fatigue Screening   Have you been more tired than usual lately? No         2025   Urinary Incontinence Screening   Bothered by leaking urine in past 6 months No         Today's PHQ-2 Score:       2025     8:22 PM   PHQ-2 (  [employer])   Q1: Little interest or pleasure in doing things 0   Q2: Feeling down, depressed or hopeless 0   PHQ-2 Score 0    Q1: Little interest or pleasure in doing things Not at all   Q2: Feeling down, depressed or hopeless Not at all   PHQ-2 Score 0       Patient-reported           2025   Substance Use   Alcohol more than 3/day or more than 7/wk No   Do you have a current opioid prescription? No   How severe/bad is pain from 1 to 10? 2/10   Do you use any other substances recreationally? No     Social History     Tobacco Use    Smoking status: Former     Current packs/day: 0.00     Average packs/day: 1 pack/day for 40.0 years (40.0 ttl pk-yrs)     Types: Cigarettes     Start date: 1973     Quit date: 2013     Years since quittin.4    Smokeless tobacco: Never   Vaping Use    Vaping status: Never Used   Substance Use Topics    Alcohol use: Yes     Comment: social    Drug use: No       ASCVD Risk   The 10-year ASCVD risk score (Ingrid CAMPBELL, et al., 2019) is: 16.3%    Values used to calculate the score:      Age: 70 years      Sex: Male      Is Non- : No      Diabetic:  No      Tobacco smoker: No      Systolic Blood Pressure: 128 mmHg      Is BP treated: Yes      HDL Cholesterol: 66 mg/dL      Total Cholesterol: 154 mg/dL            Reviewed and updated as needed this visit by Provider     Meds                Past Medical History:   Diagnosis Date    Bicuspid aortic valve     COPD (chronic obstructive pulmonary disease) (H) 10/2015    CTS (carpal tunnel syndrome)     B CTS  + EMG 2021    Hyperlipidemia 10/2015    Hypertension 10/2015     Past Surgical History:   Procedure Laterality Date    ABDOMEN SURGERY      COLONOSCOPY      COLONOSCOPY N/A 2/14/2020    Procedure: COLONOSCOPY, WITH POLYPECTOMY AND BIOPSY;  Surgeon: Emerson Meyer MD;  Location: [facility]    ENT SURGERY      Tonsels    HERNIA REPAIR      ORTHOPEDIC SURGERY  feb 2017    broken ankle    RELEASE CARPAL TUNNEL Left 5/14/2024    Procedure: RELEASE, left CARPAL TUNNEL;  Surgeon: Michael Alejandro MD;  Location: [facility]    RELEASE CARPAL TUNNEL Right 6/4/2024    Procedure: RELEASE, right CARPAL TUNNEL;  Surgeon: Michael Alejandro MD;  Location: [facility]     Patient Active Problem List   Diagnosis    HL (hearing loss)    COPD (chronic obstructive pulmonary disease) (H)    CARDIOVASCULAR SCREENING; LDL GOAL LESS THAN 130    Benign essential hypertension    Former smoker    Undiagnosed cardiac murmurs    Hyperlipidemia with target LDL less than 100    Status post coronary angiogram    Stage 1 mild COPD by GOLD classification (H)    Tubular adenoma of colon    Bicuspid aortic valve    Overweight (BMI 25.0-29.9)    Ascending aorta dilatation     Past Surgical History:   Procedure Laterality Date    ABDOMEN SURGERY      COLONOSCOPY      COLONOSCOPY N/A 2/14/2020    Procedure: COLONOSCOPY, WITH POLYPECTOMY AND BIOPSY;  Surgeon: Emerson Meyer MD;  Location: [facility]    ENT SURGERY      Tonsels    HERNIA REPAIR      ORTHOPEDIC SURGERY  feb 2017    broken ankle    RELEASE CARPAL TUNNEL Left 5/14/2024    Procedure: RELEASE, left CARPAL  TUNNEL;  Surgeon: Michael Alejandro MD;  Location: [facility]    RELEASE CARPAL TUNNEL Right 2024    Procedure: RELEASE, right CARPAL TUNNEL;  Surgeon: Michael Alejandro MD;  Location: [facility]       Social History     Tobacco Use    Smoking status: Former     Current packs/day: 0.00     Average packs/day: 1 pack/day for 40.0 years (40.0 ttl pk-yrs)     Types: Cigarettes     Start date: 1973     Quit date: 2013     Years since quittin.4    Smokeless tobacco: Never   Substance Use Topics    Alcohol use: Yes     Comment: social     Family History   Problem Relation Age of Onset    Diabetes Mother     Peripheral Vascular Disease Mother         mother w/ PAD    Heart Failure Mother     Heart Failure Maternal Half-Brother          of heart attack age 57    Coronary Artery Disease No family hx of          Current Outpatient Medications   Medication Sig Dispense Refill    albuterol (PROAIR HFA/PROVENTIL HFA/VENTOLIN HFA) 108 (90 Base) MCG/ACT inhaler Inhale 2 puffs into the lungs every 4 hours as needed for shortness of breath or wheezing. 18 g 3    aspirin 81 MG tablet Take by mouth daily 30 tablet     calcium carbonate-vitamin D 500-400 MG-UNIT TABS tablt Take 1 tablet by mouth daily 180 tablet 3    fluticasone-salmeterol (ADVAIR) 250-50 MCG/ACT inhaler Inhale 1 puff into the lungs 2 times daily 180 each 3    lisinopril (ZESTRIL) 20 MG tablet Take 1 tablet (20 mg) by mouth daily. 90 tablet 3    omega 3 1000 MG CAPS Take 1 g by mouth daily 90 capsule     rosuvastatin (CRESTOR) 10 MG tablet Take 1 tablet (10 mg) by mouth daily. 90 tablet 3     Allergies   Allergen Reactions    Nka [No Known Allergies]      Current providers sharing in care for this patient include:  Patient Care Team:  Kirill Hauser MD as PCP - General (Family Practice)  Kirill Hauser MD as Assigned PCP  Michael Alejandro MD as Assigned Musculoskeletal Provider  Carina Winston APRN CNP as Assigned  "Heart and Vascular Provider    The following health maintenance items are reviewed in Epic and correct as of today:  Health Maintenance   Topic Date Due    COPD ACTION PLAN  Never done    RSV VACCINE (1 - Risk 60-74 years 1-dose series) Never done    COVID-19 Vaccine (6 - 2024-25 season) 09/01/2024    LUNG CANCER SCREENING  04/11/2025    BMP  10/28/2025    LIPID  10/28/2025    MEDICARE ANNUAL WELLNESS VISIT  05/22/2026    ANNUAL REVIEW OF HM ORDERS  05/22/2026    FALL RISK ASSESSMENT  05/22/2026    DIABETES SCREENING  10/28/2027    DTAP/TDAP/TD IMMUNIZATION (3 - Td or Tdap) 12/27/2028    COLORECTAL CANCER SCREENING  02/14/2030    ADVANCE CARE PLANNING  05/22/2030    SPIROMETRY  Completed    HEPATITIS C SCREENING  Completed    PHQ-2 (once per calendar year)  Completed    INFLUENZA VACCINE  Completed    Pneumococcal Vaccine: 50+ Years  Completed    ZOSTER IMMUNIZATION  Completed    AORTIC ANEURYSM SCREENING (SYSTEM ASSIGNED)  Completed    HPV IMMUNIZATION  Aged Out    MENINGITIS IMMUNIZATION  Aged Out         Review of Systems  Constitutional, HEENT, cardiovascular, pulmonary, GI, , musculoskeletal, neuro, skin, endocrine and psych systems are negative, except as otherwise noted.     Objective    Exam  /88 (BP Location: Right arm, Patient Position: Sitting, Cuff Size: Adult Regular)   Pulse 70   Temp 97.3  F (36.3  C) (Tympanic)   Resp 16   Ht 1.657 m (5' 5.25\")   Wt 80.3 kg (177 lb)   SpO2 93%   BMI 29.23 kg/m     Estimated body mass index is 29.23 kg/m  as calculated from the following:    Height as of this encounter: 1.657 m (5' 5.25\").    Weight as of this encounter: 80.3 kg (177 lb).    Physical Exam  GENERAL APPEARANCE: overweight, ambulatory w/o assist, alert and no distress  EYES: pink conj, no icterus, PERRL, EOMI  HENT: ear canals and TM's normal, nose clear,  mouth without ulcers or lesions, oropharynx clear and oral mucous membranes moist  NECK: no adenopathy, no asymmetry, masses, or scars " and thyroid normal to palpation  RESP: lungs clear to auscultation - no rales, rhonchi or wheezes  CV: normal rate, regular rhythm, grade 2 systolic  murmur,   ABDOMEN: soft, nontender, no hepatosplenomegaly, no masses and bowel sounds normal  DIRECT RECTAL EXAM: deferred  MS: no musculoskeletal defects are noted and gait is age appropriate without ataxia; no edema  SKIN: no suspicious lesions or rashes  NEURO: Normal strength and tone, sensory exam grossly normal, mentation intact and speech normal          5/22/2025   Mini Cog   Clock Draw Score 2 Normal    2 Normal   3 Item Recall 3 objects recalled   Mini Cog Total Score 5       Multiple values from one day are sorted in reverse-chronological order             Signed Electronically by: Kirill Hauser MD

## 2025-06-13 ENCOUNTER — RESULTS FOLLOW-UP (OUTPATIENT)
Dept: CARDIOLOGY | Facility: CLINIC | Age: 70
End: 2025-06-13

## 2025-06-13 ENCOUNTER — HOSPITAL ENCOUNTER (OUTPATIENT)
Dept: CARDIOLOGY | Facility: CLINIC | Age: 70
Discharge: HOME | End: 2025-06-13
Attending: NURSE PRACTITIONER | Admitting: NURSE PRACTITIONER
Payer: COMMERCIAL

## 2025-06-13 DIAGNOSIS — I35.0 NONRHEUMATIC AORTIC VALVE STENOSIS: ICD-10-CM

## 2025-06-13 DIAGNOSIS — Q23.81 BICUSPID AORTIC VALVE: ICD-10-CM

## 2025-06-13 DIAGNOSIS — I77.810 DILATATION OF THORACIC AORTA: ICD-10-CM

## 2025-06-13 LAB — LVEF ECHO: NORMAL

## 2025-06-13 PROCEDURE — 93306 TTE W/DOPPLER COMPLETE: CPT

## 2025-06-13 PROCEDURE — 93306 TTE W/DOPPLER COMPLETE: CPT | Mod: 26 | Performed by: INTERNAL MEDICINE

## 2025-06-25 ENCOUNTER — HOSPITAL ENCOUNTER (OUTPATIENT)
Dept: CT IMAGING | Facility: CLINIC | Age: 70
Discharge: HOME | End: 2025-06-25
Attending: FAMILY MEDICINE
Payer: COMMERCIAL

## 2025-06-25 DIAGNOSIS — Z87.891 HISTORY OF TOBACCO USE, PRESENTING HAZARDS TO HEALTH: ICD-10-CM

## 2025-06-25 PROCEDURE — 71271 CT THORAX LUNG CANCER SCR C-: CPT

## 2025-06-27 ENCOUNTER — RESULTS FOLLOW-UP (OUTPATIENT)
Dept: FAMILY MEDICINE | Facility: CLINIC | Age: 70
End: 2025-06-27

## 2025-08-28 ENCOUNTER — LAB (OUTPATIENT)
Dept: LAB | Facility: CLINIC | Age: 70
End: 2025-08-28
Payer: COMMERCIAL

## 2025-08-28 DIAGNOSIS — I10 BENIGN ESSENTIAL HYPERTENSION: ICD-10-CM

## 2025-08-28 DIAGNOSIS — Z12.5 SCREENING FOR PROSTATE CANCER: ICD-10-CM

## 2025-08-28 DIAGNOSIS — E78.2 MIXED HYPERLIPIDEMIA: ICD-10-CM

## 2025-08-28 LAB
ALT SERPL W P-5'-P-CCNC: 35 U/L (ref 0–70)
ANION GAP SERPL CALCULATED.3IONS-SCNC: 14 MMOL/L (ref 7–15)
BUN SERPL-MCNC: 18.5 MG/DL (ref 8–23)
CALCIUM SERPL-MCNC: 10.2 MG/DL (ref 8.8–10.4)
CHLORIDE SERPL-SCNC: 102 MMOL/L (ref 98–107)
CHOLEST SERPL-MCNC: 179 MG/DL
CREAT SERPL-MCNC: 0.93 MG/DL (ref 0.67–1.17)
EGFRCR SERPLBLD CKD-EPI 2021: 88 ML/MIN/1.73M2
FASTING STATUS PATIENT QL REPORTED: YES
FASTING STATUS PATIENT QL REPORTED: YES
GLUCOSE SERPL-MCNC: 91 MG/DL (ref 70–99)
HCO3 SERPL-SCNC: 24 MMOL/L (ref 22–29)
HDLC SERPL-MCNC: 74 MG/DL
LDLC SERPL CALC-MCNC: 92 MG/DL
NONHDLC SERPL-MCNC: 105 MG/DL
POTASSIUM SERPL-SCNC: 4.7 MMOL/L (ref 3.4–5.3)
PSA SERPL DL<=0.01 NG/ML-MCNC: 1.26 NG/ML (ref 0–6.5)
SODIUM SERPL-SCNC: 140 MMOL/L (ref 135–145)
TRIGL SERPL-MCNC: 67 MG/DL

## (undated) DEVICE — CUFF TOURN 18IN STRL DISP

## (undated) DEVICE — SYR 10ML LL W/O NDL

## (undated) DEVICE — NDL 25GA 1.5" 305127

## (undated) DEVICE — DRSG ADAPTIC 3X3" 6112

## (undated) DEVICE — GLOVE BIOGEL PI MICRO INDICATOR UNDERGLOVE SZ 7.5 48975

## (undated) DEVICE — CAST PADDING 4" UNSTERILE 9044

## (undated) DEVICE — GOWN XLG DISP 9545

## (undated) DEVICE — SU ETHILON 4-0 FS-1 1629H

## (undated) DEVICE — PREP CHLORAPREP 26ML TINTED ORANGE  260815

## (undated) DEVICE — BNDG ELASTIC 3"X5YDS UNSTERILE 6611-30

## (undated) DEVICE — SUCTION MANIFOLD NEPTUNE 2 SYS 1 PORT 702-025-000

## (undated) DEVICE — CAST PLASTER SPLINT 4X15" 7394

## (undated) DEVICE — IMM ALUMI HAND XLG 761

## (undated) DEVICE — SOL WATER IRRIG 1000ML BOTTLE 07139-09

## (undated) DEVICE — PREP POVIDONE IODINE SOLUTION 10% 120ML

## (undated) DEVICE — GLOVE BIOGEL PI MICRO SZ 8.0 48580

## (undated) DEVICE — PACK HAND

## (undated) DEVICE — GLOVE BIOGEL PI MICRO SZ 7.5 48575

## (undated) RX ORDER — PROPOFOL 10 MG/ML
INJECTION, EMULSION INTRAVENOUS
Status: DISPENSED
Start: 2020-02-14

## (undated) RX ORDER — LIDOCAINE HYDROCHLORIDE AND EPINEPHRINE 10; 10 MG/ML; UG/ML
INJECTION, SOLUTION INFILTRATION; PERINEURAL
Status: DISPENSED
Start: 2024-05-14

## (undated) RX ORDER — LIDOCAINE HYDROCHLORIDE 10 MG/ML
INJECTION, SOLUTION INFILTRATION; PERINEURAL
Status: DISPENSED
Start: 2024-06-04

## (undated) RX ORDER — LIDOCAINE HYDROCHLORIDE 10 MG/ML
INJECTION, SOLUTION EPIDURAL; INFILTRATION; INTRACAUDAL; PERINEURAL
Status: DISPENSED
Start: 2020-02-14

## (undated) RX ORDER — BUPIVACAINE HYDROCHLORIDE 2.5 MG/ML
INJECTION, SOLUTION EPIDURAL; INFILTRATION; INTRACAUDAL
Status: DISPENSED
Start: 2024-05-14

## (undated) RX ORDER — BUPIVACAINE HYDROCHLORIDE 2.5 MG/ML
INJECTION, SOLUTION EPIDURAL; INFILTRATION; INTRACAUDAL
Status: DISPENSED
Start: 2024-06-04